# Patient Record
Sex: FEMALE | Race: BLACK OR AFRICAN AMERICAN | NOT HISPANIC OR LATINO | Employment: UNEMPLOYED | URBAN - METROPOLITAN AREA
[De-identification: names, ages, dates, MRNs, and addresses within clinical notes are randomized per-mention and may not be internally consistent; named-entity substitution may affect disease eponyms.]

---

## 2023-03-29 DIAGNOSIS — M25.531 BILATERAL WRIST PAIN: Primary | ICD-10-CM

## 2023-03-29 DIAGNOSIS — M25.532 BILATERAL WRIST PAIN: Primary | ICD-10-CM

## 2023-05-12 ENCOUNTER — OFFICE VISIT (OUTPATIENT)
Dept: SPORTS MEDICINE | Facility: CLINIC | Age: 38
End: 2023-05-12
Payer: MEDICAID

## 2023-05-12 ENCOUNTER — HOSPITAL ENCOUNTER (OUTPATIENT)
Dept: RADIOLOGY | Facility: HOSPITAL | Age: 38
Discharge: HOME | End: 2023-05-12
Attending: STUDENT IN AN ORGANIZED HEALTH CARE EDUCATION/TRAINING PROGRAM
Payer: MEDICAID

## 2023-05-12 VITALS — HEIGHT: 62 IN | RESPIRATION RATE: 18 BRPM | WEIGHT: 194.25 LBS | BODY MASS INDEX: 35.75 KG/M2

## 2023-05-12 DIAGNOSIS — M25.532 BILATERAL WRIST PAIN: ICD-10-CM

## 2023-05-12 DIAGNOSIS — G56.03 BILATERAL CARPAL TUNNEL SYNDROME: Primary | ICD-10-CM

## 2023-05-12 DIAGNOSIS — M25.531 BILATERAL WRIST PAIN: ICD-10-CM

## 2023-05-12 DIAGNOSIS — G62.9 NEUROPATHY: ICD-10-CM

## 2023-05-12 PROCEDURE — 3008F BODY MASS INDEX DOCD: CPT | Mod: CPTII,,, | Performed by: STUDENT IN AN ORGANIZED HEALTH CARE EDUCATION/TRAINING PROGRAM

## 2023-05-12 PROCEDURE — 99204 OFFICE O/P NEW MOD 45 MIN: CPT | Mod: S$PBB,,, | Performed by: STUDENT IN AN ORGANIZED HEALTH CARE EDUCATION/TRAINING PROGRAM

## 2023-05-12 PROCEDURE — 1160F RVW MEDS BY RX/DR IN RCRD: CPT | Mod: CPTII,,, | Performed by: STUDENT IN AN ORGANIZED HEALTH CARE EDUCATION/TRAINING PROGRAM

## 2023-05-12 PROCEDURE — 3008F PR BODY MASS INDEX (BMI) DOCUMENTED: ICD-10-PCS | Mod: CPTII,,, | Performed by: STUDENT IN AN ORGANIZED HEALTH CARE EDUCATION/TRAINING PROGRAM

## 2023-05-12 PROCEDURE — 73110 XR WRIST COMPLETE 3 VIEWS BILATERAL: ICD-10-PCS | Mod: 26,50,, | Performed by: RADIOLOGY

## 2023-05-12 PROCEDURE — 1160F PR REVIEW ALL MEDS BY PRESCRIBER/CLIN PHARMACIST DOCUMENTED: ICD-10-PCS | Mod: CPTII,,, | Performed by: STUDENT IN AN ORGANIZED HEALTH CARE EDUCATION/TRAINING PROGRAM

## 2023-05-12 PROCEDURE — 99999 PR PBB SHADOW E&M-EST. PATIENT-LVL III: ICD-10-PCS | Mod: PBBFAC,,, | Performed by: STUDENT IN AN ORGANIZED HEALTH CARE EDUCATION/TRAINING PROGRAM

## 2023-05-12 PROCEDURE — 73110 X-RAY EXAM OF WRIST: CPT | Mod: 26,50,, | Performed by: RADIOLOGY

## 2023-05-12 PROCEDURE — 1159F MED LIST DOCD IN RCRD: CPT | Mod: CPTII,,, | Performed by: STUDENT IN AN ORGANIZED HEALTH CARE EDUCATION/TRAINING PROGRAM

## 2023-05-12 PROCEDURE — 1159F PR MEDICATION LIST DOCUMENTED IN MEDICAL RECORD: ICD-10-PCS | Mod: CPTII,,, | Performed by: STUDENT IN AN ORGANIZED HEALTH CARE EDUCATION/TRAINING PROGRAM

## 2023-05-12 PROCEDURE — 73110 X-RAY EXAM OF WRIST: CPT | Mod: TC,50,FY,PO

## 2023-05-12 PROCEDURE — 99213 OFFICE O/P EST LOW 20 MIN: CPT | Mod: PBBFAC,PO | Performed by: STUDENT IN AN ORGANIZED HEALTH CARE EDUCATION/TRAINING PROGRAM

## 2023-05-12 PROCEDURE — 99999 PR PBB SHADOW E&M-EST. PATIENT-LVL III: CPT | Mod: PBBFAC,,, | Performed by: STUDENT IN AN ORGANIZED HEALTH CARE EDUCATION/TRAINING PROGRAM

## 2023-05-12 PROCEDURE — 99204 PR OFFICE/OUTPT VISIT, NEW, LEVL IV, 45-59 MIN: ICD-10-PCS | Mod: S$PBB,,, | Performed by: STUDENT IN AN ORGANIZED HEALTH CARE EDUCATION/TRAINING PROGRAM

## 2023-05-12 RX ORDER — ALPRAZOLAM 2 MG/1
2 TABLET ORAL 3 TIMES DAILY
COMMUNITY
Start: 2023-04-10

## 2023-05-12 RX ORDER — CYCLOBENZAPRINE HCL 10 MG
10 TABLET ORAL 3 TIMES DAILY PRN
COMMUNITY
Start: 2023-05-04 | End: 2023-05-14

## 2023-05-12 RX ORDER — IBUPROFEN 800 MG/1
800 TABLET ORAL 3 TIMES DAILY
COMMUNITY
Start: 2023-02-13 | End: 2023-06-26 | Stop reason: SDUPTHER

## 2023-05-12 RX ORDER — OXYCODONE AND ACETAMINOPHEN 5; 325 MG/1; MG/1
1 TABLET ORAL 3 TIMES DAILY
COMMUNITY
Start: 2023-05-04

## 2023-05-12 RX ORDER — NAPROXEN 500 MG/1
TABLET ORAL
COMMUNITY
Start: 2023-05-01 | End: 2023-06-26 | Stop reason: ALTCHOICE

## 2023-05-12 RX ORDER — GABAPENTIN 100 MG/1
CAPSULE ORAL
Qty: 90 CAPSULE | Refills: 1 | Status: SHIPPED | OUTPATIENT
Start: 2023-05-12 | End: 2023-06-26
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
## 2023-05-12 NOTE — PATIENT INSTRUCTIONS
Assessment:  Radha Bowens is a 37 y.o. female with a chief complaint of Swelling, Numbness, and Pain of the Left Wrist and Swelling, Pain, and Numbness of the Right Wrist    Encounter Diagnoses   Name Primary?    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, no other abnormalities  No pertinent labs available for review  History & clinical exam is consistent with chronic bilateral carpal tunnel syndrome  As she is not responding sufficiently with corticosteroid injection from PCP, and as she has not had prior testing, recommend EMG/NCS to further evaluate and help determine the best next course of treatment  Referral placed to [facility] for EMG/NCS  Prescription today for gabapentin  Begin 100 mg, three times per day  After 1-2 weeks, can increase to 200-300 mg (2-3 tablets), three times per day  Don't exceed 900 mg in a day prior to reevaluation  Will follow up after EMG/NCS, discuss results and determine further treatment at that time    Follow-up: after EMG or sooner if there are any problems between now and then.    Thank you for choosing [facility] and Dr. Yamil Ashley for your orthopedic & sports medicine care. It is our goal to provide you with exceptional care that will help keep you healthy, active, and get you back in the game.    Please do not hesitate to reach out to us via email, phone, or MyChart with any questions, concerns, or feedback.    If you are experiencing pain/discomfort ,or have questions after 5pm and would like to be connected to the [facility] on-call team, please call this number and specify which Sports Medicine provider is treating you: [phone]

## 2023-05-16 ENCOUNTER — TELEPHONE (OUTPATIENT)
Dept: PHYSICAL MEDICINE AND REHAB | Facility: CLINIC | Age: 38
End: 2023-05-16
Payer: MEDICAID

## 2023-05-16 NOTE — TELEPHONE ENCOUNTER
Attempted to call patient to schedule EMG. Unable to leave message and patient doesn't have Mychart.

## 2023-06-20 ENCOUNTER — OFFICE VISIT (OUTPATIENT)
Dept: PHYSICAL MEDICINE AND REHAB | Facility: CLINIC | Age: 38
End: 2023-06-20
Payer: MEDICAID

## 2023-06-20 ENCOUNTER — TELEPHONE (OUTPATIENT)
Dept: SPORTS MEDICINE | Facility: CLINIC | Age: 38
End: 2023-06-20
Payer: MEDICAID

## 2023-06-20 VITALS — WEIGHT: 194 LBS | BODY MASS INDEX: 35.7 KG/M2 | RESPIRATION RATE: 14 BRPM | HEIGHT: 62 IN

## 2023-06-20 DIAGNOSIS — G56.03 BILATERAL CARPAL TUNNEL SYNDROME: Primary | ICD-10-CM

## 2023-06-20 DIAGNOSIS — M79.18 DIFFUSE MYOFASCIAL PAIN SYNDROME: ICD-10-CM

## 2023-06-20 PROBLEM — F41.9 ANXIETY: Status: ACTIVE | Noted: 2023-01-08

## 2023-06-20 PROBLEM — E87.70 HYPERVOLEMIA: Status: ACTIVE | Noted: 2022-09-26

## 2023-06-20 PROBLEM — I10 BENIGN ESSENTIAL HYPERTENSION: Status: ACTIVE | Noted: 2022-09-26

## 2023-06-20 PROBLEM — L81.9 DYSPIGMENTATION: Status: ACTIVE | Noted: 2017-02-15

## 2023-06-20 PROBLEM — E66.01 MORBID (SEVERE) OBESITY DUE TO EXCESS CALORIES: Status: ACTIVE | Noted: 2023-01-08

## 2023-06-20 PROCEDURE — 95911 NRV CNDJ TEST 9-10 STUDIES: CPT | Mod: 26,S$PBB,, | Performed by: PHYSICAL MEDICINE & REHABILITATION

## 2023-06-20 PROCEDURE — 3008F BODY MASS INDEX DOCD: CPT | Mod: CPTII,,, | Performed by: PHYSICAL MEDICINE & REHABILITATION

## 2023-06-20 PROCEDURE — 99999 PR PBB SHADOW E&M-EST. PATIENT-LVL III: CPT | Mod: PBBFAC,,, | Performed by: PHYSICAL MEDICINE & REHABILITATION

## 2023-06-20 PROCEDURE — 1160F RVW MEDS BY RX/DR IN RCRD: CPT | Mod: CPTII,,, | Performed by: PHYSICAL MEDICINE & REHABILITATION

## 2023-06-20 PROCEDURE — 3008F PR BODY MASS INDEX (BMI) DOCUMENTED: ICD-10-PCS | Mod: CPTII,,, | Performed by: PHYSICAL MEDICINE & REHABILITATION

## 2023-06-20 PROCEDURE — 99202 OFFICE O/P NEW SF 15 MIN: CPT | Mod: 25,S$PBB,, | Performed by: PHYSICAL MEDICINE & REHABILITATION

## 2023-06-20 PROCEDURE — 95911 PR NERVE CONDUCTION STUDY; 9-10 STUDIES: ICD-10-PCS | Mod: 26,S$PBB,, | Performed by: PHYSICAL MEDICINE & REHABILITATION

## 2023-06-20 PROCEDURE — 1160F PR REVIEW ALL MEDS BY PRESCRIBER/CLIN PHARMACIST DOCUMENTED: ICD-10-PCS | Mod: CPTII,,, | Performed by: PHYSICAL MEDICINE & REHABILITATION

## 2023-06-20 PROCEDURE — 99202 PR OFFICE/OUTPT VISIT, NEW, LEVL II, 15-29 MIN: ICD-10-PCS | Mod: 25,S$PBB,, | Performed by: PHYSICAL MEDICINE & REHABILITATION

## 2023-06-20 PROCEDURE — 1159F MED LIST DOCD IN RCRD: CPT | Mod: CPTII,,, | Performed by: PHYSICAL MEDICINE & REHABILITATION

## 2023-06-20 PROCEDURE — 99999 PR PBB SHADOW E&M-EST. PATIENT-LVL III: ICD-10-PCS | Mod: PBBFAC,,, | Performed by: PHYSICAL MEDICINE & REHABILITATION

## 2023-06-20 PROCEDURE — 99213 OFFICE O/P EST LOW 20 MIN: CPT | Mod: PBBFAC,25 | Performed by: PHYSICAL MEDICINE & REHABILITATION

## 2023-06-20 PROCEDURE — 95911 NRV CNDJ TEST 9-10 STUDIES: CPT | Mod: PBBFAC | Performed by: PHYSICAL MEDICINE & REHABILITATION

## 2023-06-20 PROCEDURE — 1159F PR MEDICATION LIST DOCUMENTED IN MEDICAL RECORD: ICD-10-PCS | Mod: CPTII,,, | Performed by: PHYSICAL MEDICINE & REHABILITATION

## 2023-06-20 RX ORDER — METHOCARBAMOL 500 MG/1
TABLET, FILM COATED ORAL
COMMUNITY
Start: 2023-05-01

## 2023-06-20 RX ORDER — SEMAGLUTIDE 0.68 MG/ML
INJECTION, SOLUTION SUBCUTANEOUS
COMMUNITY
Start: 2023-04-18

## 2023-06-20 NOTE — PROGRESS NOTES
[facility]   [address]  [place] [address]  Phone: [phone]        Full Name: Radha Bowens YOB: 1985  Patient ID: [id number]      Visit Date: 6/20/2023 13:33  Age: 37 Years  Examining Physician: Dr Persaud  Referring Physician: Dr Ashley  Conclusion: upper ext/  Chief Complaint   Patient presents with    Hand Pain    Muscle Pain         HPI: This is a 37 y.o.  female being seen in clinic today for evaluation of chronic hand and arm achy pain and numbness with associated tightness and pain in her shoulders.  With arm usage her symptoms worsen.  She feels weakness of hand  strength and difficulty opening things.  In the past she had CTS injections that have stopped helping.    History obtained from patient    Past family, medical, social, and surgical history reviewed in chart    Review of Systems:     General- denies lethargy, weight change, fever, chills  Head/neck- denies swallowing difficulties  ENT- denies hearing changes  Cardiovascular-denies chest pain  Pulmonary- denies shortness of breath  GI- denies constipation or bowel incontinence  - denies bladder incontinence  Skin- denies wounds or rashes  Musculoskeletal- + weakness, + pain  Neurologic- + numbness and tingling  Psychiatric- denies depressive or psychotic features, + anxiety  Lymphatic-denies swelling  Endocrine- denies hypoglycemic symptoms/DM history  All other pertinent systems negative     Physical Examination:  General: Well developed, well nourished female, NAD  HEENT:NCAT EOMI bilaterally   Pulmonary:Normal respirations    Spinal Examination: CERVICAL  Active ROM is within normal limits.  Inspection: No deformity of spinal alignment.  Palpation: No vertebral tenderness to percussion.  Tight and ttp at bilateral trapezius, levator scapula, rhomboids with mild triggering    Spinal Examination: LUMBAR or THORACIC  Active ROM is within normal limits.  Inspection: No deformity of spinal alignment.       Musculoskeletal Tests:  Phalen:   Elbow compression (ulnar): neg  Tinels at wrist: +bilaterally    Bilateral Upper and Lower Extremities:  Pulses are 2+ at radial bilaterally.  Shoulder/Elbow/Wrist/Hand ROM wnl   Hip/Knee/Ankle ROM   Bilateral Extremities show normal capillary refill.  No signs of cyanosis, rubor, edema, skin changes, or dysvascular changes of appendages.  Nails appear intact.    Neurological Exam:  Cranial Nerves:  II-XII grossly intact    Manual Muscle Testing: (Motor 5=normal)  5/5 strength bilateral upper extremities except 4/5 apb and     No focal atrophy is noted of either upper or lower extremity.    Bilateral Reflexes:  Renee's response is absent bilaterally.    Sensation: tested to light touch  - intact in arms except dec at fingertips    Gait: Narrow base and good arm swing.      Entire procedure explained to patient prior to proceeding.  Verbal consent obtained      Sensory NCS      Nerve / Sites Rec. Site Onset Lat Peak Lat NP Amp PP Amp Segments Distance Velocity     ms ms µV µV  mm m/s   R Median - Digit II (Antidromic)      Wrist Dig II 3.52 4.35 15.8 20.8 Wrist - Dig  40   L Median - Digit II (Antidromic)      Wrist Dig II 3.54 4.40 10.8 28.5 Wrist - Dig  40   R Ulnar - Digit V (Antidromic)      Wrist Dig V 2.58 3.17 10.3 17.0 Wrist - Dig V 140 54   L Ulnar - Digit V (Antidromic)      Wrist Dig V 2.73 3.17 21.8 29.0 Wrist - Dig V 140 51   R Radial - Anatomical snuff box (Forearm)      Forearm Wrist 1.83 2.21 11.1 11.1 Forearm - Wrist 100 55   L Radial - Anatomical snuff box (Forearm)      Forearm Wrist 1.56 2.08 13.7 20.0 Forearm - Wrist 100 64                   Motor NCS      Nerve / Sites Muscle Latency Amplitude Amp % Duration Segments Distance Lat Diff Velocity     ms mV % ms  mm ms m/s   R Median - APB      Wrist APB 4.29 13.2 100 6.02 Wrist - APB 80        Elbow APB 7.50 12.2 92.1 6.21 Elbow - Wrist 180 3.21 56   L Median - APB      Wrist APB 4.65 8.2 100  6.54 Wrist - APB 80        Elbow APB 8.10 6.5 79.4 6.27 Elbow - Wrist 190 3.46 55   R Ulnar - ADM      Wrist ADM 2.90 6.0 100 6.02 Wrist - ADM 80        B.Elbow ADM 5.73 6.7 112 6.27 B.Elbow - Wrist 200 2.83 71      A.Elbow ADM 7.17 7.2 119 6.15 A.Elbow - B.Elbow 100 1.44 70   L Ulnar - ADM      Wrist ADM 2.60 8.9 100 6.15 Wrist - ADM 80        B.Elbow ADM 5.77 8.6 96.7 6.25 B.Elbow - Wrist 210 3.17 66      A.Elbow ADM 7.56 7.0 78.6 6.21 A.Elbow - B.Elbow 110 1.79 61                   INTERPRETATION  -Bilateral median motor nerve conduction study showed prolonged latency on the left, normal amplitude, and conduction velocity  -Bilateral median sensory nerve conduction study showed prolonged peak latency and normal amplitude  -Bilateral ulnar motor nerve conduction study showed normal latency, amplitude, and conduction velocity  -Bilateral ulnar sensory nerve conduction study showed normal peak latency and amplitude  -Bilateral radial sensory nerve conduction study showed normal peak latency and amplitude      IMPRESSION  ABNORMAL study  2. There is electrodiagnostic evidence of a moderate demyelinating median neuropathy (Carpal tunnel syndrome) across the left wrist and a mild demyelinating CTS across the right wrist  3. There is clinical evidence of myofascial pain    PLAN  Discussed in detail for greater than 30 minutes about diagnosis and treatment plan    1. Follow up with referring provider: Dr. Yamil Ashley  2. Handouts on CTS provided. Rec referral to orthopedics for CTR eval. Consider referral to PT for neck/back myofascial pain  3. This study is good for one year. If symptoms worsen or do not improve, please re-consult.    Ayesha Persaud M.D.  Physical Medicine and Rehab

## 2023-06-22 ENCOUNTER — TELEPHONE (OUTPATIENT)
Dept: SPORTS MEDICINE | Facility: CLINIC | Age: 38
End: 2023-06-22
Payer: MEDICAID

## 2023-06-22 NOTE — TELEPHONE ENCOUNTER
----- Message from Ernesto Cesar LPN sent at 6/22/2023  3:07 PM CDT -----  Contact: Radha    ----- Message -----  From: Charisma Alvarado  Sent: 6/22/2023   3:05 PM CDT  To: [facility] Staff    Pt is calling in regards to wanting to what happen next. Pt had her appt on 06/20 and was told she has carpal tunnel and was unsure if anyone would be contacting her about the next steps. Please call back at [phone]                          Thanks  KT

## 2023-06-26 ENCOUNTER — OFFICE VISIT (OUTPATIENT)
Dept: SPORTS MEDICINE | Facility: CLINIC | Age: 38
End: 2023-06-26
Payer: MEDICAID

## 2023-06-26 DIAGNOSIS — G89.29 CHRONIC MYOFASCIAL PAIN: ICD-10-CM

## 2023-06-26 DIAGNOSIS — G56.03 BILATERAL CARPAL TUNNEL SYNDROME: Primary | ICD-10-CM

## 2023-06-26 DIAGNOSIS — G89.4 CHRONIC PAIN SYNDROME: ICD-10-CM

## 2023-06-26 DIAGNOSIS — M79.18 CHRONIC MYOFASCIAL PAIN: ICD-10-CM

## 2023-06-26 DIAGNOSIS — G62.9 NEUROPATHY: ICD-10-CM

## 2023-06-26 PROCEDURE — 1159F MED LIST DOCD IN RCRD: CPT | Mod: CPTII,,, | Performed by: STUDENT IN AN ORGANIZED HEALTH CARE EDUCATION/TRAINING PROGRAM

## 2023-06-26 PROCEDURE — 99214 PR OFFICE/OUTPT VISIT, EST, LEVL IV, 30-39 MIN: ICD-10-PCS | Mod: S$PBB,,, | Performed by: STUDENT IN AN ORGANIZED HEALTH CARE EDUCATION/TRAINING PROGRAM

## 2023-06-26 PROCEDURE — 99999 PR PBB SHADOW E&M-EST. PATIENT-LVL III: CPT | Mod: PBBFAC,,, | Performed by: STUDENT IN AN ORGANIZED HEALTH CARE EDUCATION/TRAINING PROGRAM

## 2023-06-26 PROCEDURE — 99213 OFFICE O/P EST LOW 20 MIN: CPT | Mod: PBBFAC,PN | Performed by: STUDENT IN AN ORGANIZED HEALTH CARE EDUCATION/TRAINING PROGRAM

## 2023-06-26 PROCEDURE — 1159F PR MEDICATION LIST DOCUMENTED IN MEDICAL RECORD: ICD-10-PCS | Mod: CPTII,,, | Performed by: STUDENT IN AN ORGANIZED HEALTH CARE EDUCATION/TRAINING PROGRAM

## 2023-06-26 PROCEDURE — 99999 PR PBB SHADOW E&M-EST. PATIENT-LVL III: ICD-10-PCS | Mod: PBBFAC,,, | Performed by: STUDENT IN AN ORGANIZED HEALTH CARE EDUCATION/TRAINING PROGRAM

## 2023-06-26 PROCEDURE — 1160F RVW MEDS BY RX/DR IN RCRD: CPT | Mod: CPTII,,, | Performed by: STUDENT IN AN ORGANIZED HEALTH CARE EDUCATION/TRAINING PROGRAM

## 2023-06-26 PROCEDURE — 99214 OFFICE O/P EST MOD 30 MIN: CPT | Mod: S$PBB,,, | Performed by: STUDENT IN AN ORGANIZED HEALTH CARE EDUCATION/TRAINING PROGRAM

## 2023-06-26 PROCEDURE — 1160F PR REVIEW ALL MEDS BY PRESCRIBER/CLIN PHARMACIST DOCUMENTED: ICD-10-PCS | Mod: CPTII,,, | Performed by: STUDENT IN AN ORGANIZED HEALTH CARE EDUCATION/TRAINING PROGRAM

## 2023-06-26 RX ORDER — IBUPROFEN 800 MG/1
800 TABLET ORAL EVERY 8 HOURS PRN
Qty: 30 TABLET | Refills: 0 | Status: SHIPPED | OUTPATIENT
Start: 2023-06-26 | End: 2023-09-07 | Stop reason: ALTCHOICE

## 2023-06-26 NOTE — PATIENT INSTRUCTIONS
Assessment:  Radha Bowens is a 37 y.o. female with a chief complaint of Pain of the Right Wrist and Pain of the Left Wrist    Encounter Diagnoses   Name Primary?    Bilateral carpal tunnel syndrome Yes    Neuropathy     Chronic pain syndrome     Chronic myofascial pain       Plan:  EMG/NCV test demonstrates moderate left and mild right CTS - she has had multiple CSI in the past, with limited relief  Refill ibuprofen 800mg  Gabapentin discontinued, as not effective and given risks with concurrent Xanax use  Referral to Dr. Horta to discuss carpal tunnel release  Referral to neurology for comprehensive neuro evaluation given symptoms consistent with polyneuropathy, fibromyalgia  Physical therapy ordered at [facility] - 2-3 visits per week for 6-8 weeks.     Follow-up: As needed or sooner if there are any problems between now and then.    Thank you for choosing [facility] and Dr. Yamil Ashley for your orthopedic & sports medicine care. It is our goal to provide you with exceptional care that will help keep you healthy, active, and get you back in the game.    Please do not hesitate to reach out to us via email, phone, or MyChart with any questions, concerns, or feedback.    If you are experiencing pain/discomfort ,or have questions after 5pm and would like to be connected to the [facility] on-call team, please call this number and specify which Sports Medicine provider is treating you: [phone]
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
## 2023-07-05 ENCOUNTER — TELEPHONE (OUTPATIENT)
Dept: ORTHOPEDICS | Facility: CLINIC | Age: 38
End: 2023-07-05
Payer: MEDICAID

## 2023-07-05 NOTE — TELEPHONE ENCOUNTER
Attempted to return the patients phone call there was no answer     ----- Message from Nathaly Galvez sent at 7/5/2023  2:35 PM CDT -----  Contact: siena  Patient is calling to speak with the nurse regarding appointment. Reports having a referral in and needs to get scheduled. Please give the patient a call back at .[phone]   Thanks areynaldo

## 2023-07-06 ENCOUNTER — TELEPHONE (OUTPATIENT)
Dept: ORTHOPEDICS | Facility: CLINIC | Age: 38
End: 2023-07-06
Payer: MEDICAID

## 2023-07-06 NOTE — TELEPHONE ENCOUNTER
Spoke to the patient an was able to get her scheduled for the next available with Dr. Horta on 8/22/2023 patient verbalized understanding       ----- Message from Charisma Alvarado sent at 7/6/2023 11:25 AM CDT -----  Contact: Radha  .Type:  Patient Returning Call    Who Called:Radha  Who Left Message for Patient:Nurse  Does the patient know what this is regarding?:Yes  Would the patient rather a call back or a response via [facility]? Call back  Best Call Back Number:[phone]  Additional Information: NA                    Thanks  KT

## 2023-08-15 ENCOUNTER — CLINICAL SUPPORT (OUTPATIENT)
Dept: REHABILITATION | Facility: HOSPITAL | Age: 38
End: 2023-08-15
Payer: MEDICAID

## 2023-08-15 DIAGNOSIS — R29.898 UPPER EXTREMITY WEAKNESS: ICD-10-CM

## 2023-08-15 DIAGNOSIS — M54.50 CHRONIC BILATERAL LOW BACK PAIN WITHOUT SCIATICA: ICD-10-CM

## 2023-08-15 DIAGNOSIS — Z74.09 IMPAIRED FUNCTIONAL MOBILITY, BALANCE, AND ENDURANCE: ICD-10-CM

## 2023-08-15 DIAGNOSIS — M54.2 NECK PAIN: ICD-10-CM

## 2023-08-15 DIAGNOSIS — R29.898 WEAKNESS OF BOTH LOWER EXTREMITIES: ICD-10-CM

## 2023-08-15 DIAGNOSIS — M25.511 ACUTE PAIN OF RIGHT SHOULDER: ICD-10-CM

## 2023-08-15 DIAGNOSIS — G89.29 CHRONIC BILATERAL LOW BACK PAIN WITHOUT SCIATICA: ICD-10-CM

## 2023-08-15 PROCEDURE — 97163 PT EVAL HIGH COMPLEX 45 MIN: CPT | Mod: PN

## 2023-08-15 PROCEDURE — 97110 THERAPEUTIC EXERCISES: CPT | Mod: PN

## 2023-08-15 NOTE — PLAN OF CARE
[facility]  Physical Therapy Neurological Rehabilitation Initial Evaluation    Name: Radha Bowens  Clinic Number: [id number]    Therapy Diagnosis:   Encounter Diagnoses   Name Primary?    Chronic bilateral low back pain without sciatica     Neck pain     Acute pain of right shoulder     Upper extremity weakness     Weakness of both lower extremities     Impaired functional mobility, balance, and endurance      Physician: Yamil Ashley MD    Physician Orders: PT Eval and Treat   Medical Diagnosis from Referral:    G62.9 (ICD-10-CM) - Neuropathy   G89.4 (ICD-10-CM) - Chronic pain syndrome   M79.18,G89.29 (ICD-10-CM) - Chronic myofascial pain   Evaluation Date: 8/15/2023  Authorization Period Expiration: 6/25/2023  Plan of Care Expiration: 11/15/2023  Visit # / Visits authorized: 1/ 1    PN DUE: 9/15/2023    Time In: 1:20 PM  Time Out: 2:00 PM  Total Billable Time: 45 minutes    Precautions: Standard    Subjective   Date of onset: Pt reports that on May 1st [facility]. She slipped at fell on liquid detergent and landed on her left knee with her left hand out and extended.     History of current condition - Radha reports: bilateral neck pain that causes headaches, bilateral lower back pain, lower extremity weakness and right shoulder pain. She states she has numbness and tingling in bilateral upper and bilateral lower extremities. She reports much fatigue and weakness throughout her entire body.      Medical History:   No past medical history on file.    Surgical History:   Radha Bowens  has no past surgical history on file.    Medications:   Radha has a current medication list which includes the following prescription(s): alprazolam, ibuprofen, methocarbamol, oxycodone-acetaminophen, and ozempic.    Allergies:   Review of patient's allergies indicates:  No Known Allergies     Imaging: None    Prior Therapy: yes  Social History:  lives with their  family  Occupation: Manager for [employer]   Prior Level of Function: independent with functional mobility, recreational activity, house hold chores, work duties, and ADLs  Current Level of Function: constant low back and neck pain, increased pain with activity and prolonged sitting/standing, headaches, weakness    Pain:  Neck: Current 7/10, worst 7/10, best 5/10   Back: Current 10/10, worst 10/10, best 10/10   Location:  bilateral neck and bilateral back pain   Description:   - NECK: burning, shooting, aching,   - BACK: tight, dull, aching  Aggravating Factors: constant pain  Easing Factors: rest , muscle relaxer (not currently taking anymore)     Pts goals: decreased pain    Objective   Observation: pt pleasant and appropriate throughout evaluation; A&O x 3     Sensation:  Bilateral upper extremity numbness and tingling, new symptom. Impaired light touch sensation of right upper extremity as compared to left. Bilateral lower extremity numbness and tingling, new symptom. Impaired light touch sensation of bilateral lower extremities.     Cervical Range of Motion:    Degrees Pain   Flexion (50) 50 degrees No pain     Extension (75) 55 degrees pain     Right Rotation (75) 45 degrees Pain     Left Rotation (75) 70 degrees No pain     Right Side Bending (45) 30 degrees pain   Left Side Bending (45) 35 degrees pain      Shoulder Range of motion: Pt exhibits full and equal range of motion in bilateral shoulders. Increased pain of right shoulder with flexion and abduction at 90 degrees.     Upper Extremity Strength   (L) UE (R) UE   Shoulder flexion: 4+/5 4-/5   Shoulder Abduction: 4+/5 4-/5   Shoulder ER 4/5 4-/5   Shoulder IR 4/5 4-/5   Lower Trap 4/5 4-/5   Middle Trap 4/5 4-/5   Rhomboids 4/5 4-/5     Lumbar Range of Motion:    % of normal motion Pain   Flexion 100%   No pain        Extension 100%   No pain        Left Side Bending 100% No Pain        Right Side Bending 100% No pain        Left rotation   100% No  "pain        Right Rotation   100% No pain           Lower Extremity Strength  Right LE  Left LE    Knee extension: 4/5 Knee extension: 5/5   Knee flexion: 4/5 Knee flexion: 5/5   Hip flexion: 4/5 Hip flexion: 4+/5   Hip extension:  4/5 Hip extension: 4/5   Hip abduction: 4/5 Hip abduction: 4+/5   Hip adduction: 4/5 Hip adduction 4+/5   Ankle dorsiflexion: 5/5 Ankle dorsiflexion: 5/5   Ankle plantarflexion: 4+/5 Ankle plantarflexion: 4+/5     Special Tests:  -Repeated Flexion: increased pain  -Repeated Ext: increased pain    Function: FOTO      TREATMENT   Treatment Time In: 1:50 PM  Treatment Time Out: 2:00 PM  Total Treatment time separate from Evaluation: 10 minutes    Radha received therapeutic exercises to develop strength, endurance, ROM, flexibility, posture, and core stabilization for 10 minutes including:  UE:   - (B) upper trap stretch 2 x 30"  - (B) levator scap stretch 2 x 30"  - Pectoralis stretch 2 x 30"  - Cat/cow x10  - (B) open book x 10     LE: - education  - LTR  - Single knee to chest   - (B) piriformis stretch 2 x 30"  - (B) side-lying hamstring stretch 2 x 30"    Home Exercises and Patient Education Provided    Education provided:   - PT POC  - HEP  - PT prognosis and diagnosis  - all questions and concerns answered       Written Home Exercises Provided: Patient instructed to cont prior HEP.  Exercises were reviewed and Radha was able to demonstrate them prior to the end of the session.  Radha demonstrated good  understanding of the education provided.     See EMR under Patient Instructions for exercises provided 8/15/2023.    Assessment   Radha is a 38 y.o. female referred to outpatient Physical Therapy with a medical diagnosis of G62.9 (ICD-10-CM) - Neuropathy   G89.4 (ICD-10-CM) - Chronic pain syndrome   M79.18,G89.29 (ICD-10-CM) - Chronic myofascial pain .     The patient presents with impairments which include impairments list: ROM, strength, endurance, joint mobility, muscle " length, balance, posture, gait mechanics, core strength and stability, and functional movement patterns.  These impairments are limiting patient's ability to perform daily activities due to pain.     Pt prognosis is Fair due to personal factors and co-morbidities listed below. Pt will benefit from skilled outpatient Physical Therapy to address the deficits stated above and in the chart below, provide pt/family education, and to maximize pt's level of independence.     Plan of care discussed with patient: Yes  Pt's spiritual, cultural and educational needs considered and patient is agreeable to the plan of care and goals as stated below:       Anticipated Barriers for therapy: pain    Medical Necessity is demonstrated by the following:   History  Co-morbidities and personal factors that may impact the plan of care [] LOW: no personal factors / co-morbidities  [x] MODERATE: 1-2 personal factors / co-morbidities  [] HIGH: 3+ personal factors / co-morbidities    Moderate / High Support Documentation:   Co-morbidities affecting plan of care: See Above    Personal Factors:   age     Examination  Body Structures and Functions, activity limitations and participation restrictions that may impact the plan of care [] LOW: addressing 1-2 elements  [] MODERATE: 3+ elements  [x] HIGH: 4+ elements (please support below)    Moderate / High Support Documentation: upper extremity weakness, lower extremity weakness, decreased cervical range of motion, decreased lumbar range of motion, neck pain, low back pain, shoulder pain, impaired functional mobility      Clinical Presentation [] LOW: stable  [x] MODERATE: Evolving  [] HIGH: Unstable     Decision Making/ Complexity Score: moderate       Goals:  Short Term Goals: 5 weeks   Patient will demonstrate independence with HEP in order to progress toward functional independence  Pt will demonstrate improved pain by reports of less than or equal to 7/10 worst pain in bilateral lower back on  the verbal rating scale in order to progress toward maximal functional ability and improve QOL.  Pt will demonstrate improved pain by reports of less than or equal to 7/10 worst pain in bilateral neck on the verbal rating scale in order to progress toward maximal functional ability and improve QOL.  Pt will improve cervical extension motion to greater than or equal to 60 degrees for improved range of motion for functional activities  Pt will improve Right cervical rotation motion to greater than or equal to 55 degrees for improved range of motion for functional activities  Pt will improve cervical side bending motion to greater than or equal to 40 degrees for improved range of motion for functional activities    Long Term Goals: 8-10 weeks   Patient will demonstrate improved function as indicated by a functional score of 55% on the FOTO.  Pt will demonstrate improved pain by reports of less than or equal to 5/10 worst pain on the verbal rating scale in order to progress toward maximal functional ability and improve QOL.  Pt will demonstrate improved pain by reports of less than or equal to 5/10 worst pain on the verbal rating scale in order to progress toward maximal functional ability and improve QOL.  Patient will improve cervical range of motion to within normal limits with less than 5/10 pain for improved ease with driving tasks.   Patient will improve bilateral upper extremity strength to 4+/5 or greater for improved functional upper extremity strength  Patient will improve bilateral lower extremity strength to 4+/5 or greater for improved functional lower extremity strength    Plan   Plan of care Certification: 8/15/2023 to 11/15/2023.    Outpatient Physical Therapy 2 times weekly for 10 weeks to include the following interventions: Aquatic Therapy, Cervical/Lumbar Traction, Electrical Stimulation , Gait Training, Manual Therapy, Moist Heat/ Ice, Neuromuscular Re-ed, Orthotic Management and Training, Patient  Education, Self Care, Therapeutic Activities, Therapeutic Exercise, and Ultrasound. And any other therapies and modalities as clinically indicated and appropriate, including but not limited to FDN and telehealth. Pt may be seen by PTA as a part of pt's care team.     Chana Benson, PT, DPT  8/15/2023

## 2023-08-15 NOTE — PROGRESS NOTES
[facility]  Physical Therapy Neurological Rehabilitation Initial Evaluation    Name: Radha Bowens  Clinic Number: [id number]    Therapy Diagnosis:   Encounter Diagnoses   Name Primary?    Chronic bilateral low back pain without sciatica     Neck pain     Acute pain of right shoulder     Upper extremity weakness     Weakness of both lower extremities     Impaired functional mobility, balance, and endurance      Physician: Yamil Ashley MD    Physician Orders: PT Eval and Treat   Medical Diagnosis from Referral:    G62.9 (ICD-10-CM) - Neuropathy   G89.4 (ICD-10-CM) - Chronic pain syndrome   M79.18,G89.29 (ICD-10-CM) - Chronic myofascial pain   Evaluation Date: 8/15/2023  Authorization Period Expiration: 6/25/2023  Plan of Care Expiration: 11/15/2023  Visit # / Visits authorized: 1/ 1    PN DUE: 9/15/2023    Time In: 1:20 PM  Time Out: 2:00 PM  Total Billable Time: 45 minutes    Precautions: Standard    Subjective   Date of onset: Pt reports that on May 1st she slipped [facility] fell [facility]. She slipped at fell on liquid detergent and landed on her left knee with her left hand out and extended.     History of current condition - Radha reports: bilateral neck pain that causes headaches, bilateral lower back pain, lower extremity weakness and right shoulder pain. She states she has numbness and tingling in bilateral upper and bilateral lower extremities. She reports much fatigue and weakness throughout her entire body.      Medical History:   No past medical history on file.    Surgical History:   Radha Bowens  has no past surgical history on file.    Medications:   Radha has a current medication list which includes the following prescription(s): alprazolam, ibuprofen, methocarbamol, oxycodone-acetaminophen, and ozempic.    Allergies:   Review of patient's allergies indicates:  No Known Allergies     Imaging: None    Prior Therapy: yes  Social History:  lives with their  family  Occupation: Manager for [employer]   Prior Level of Function: independent with functional mobility, recreational activity, house hold chores, work duties, and ADLs  Current Level of Function: constant low back and neck pain, increased pain with activity and prolonged sitting/standing, headaches, weakness    Pain:  Neck: Current 7/10, worst 7/10, best 5/10   Back: Current 10/10, worst 10/10, best 10/10   Location:  bilateral neck and bilateral back pain   Description:   - NECK: burning, shooting, aching,   - BACK: tight, dull, aching  Aggravating Factors: constant pain  Easing Factors: rest , muscle relaxer (not currently taking anymore)     Pts goals: decreased pain    Objective   Observation: pt pleasant and appropriate throughout evaluation; A&O x 3     Sensation:  Bilateral upper extremity numbness and tingling, new symptom. Impaired light touch sensation of right upper extremity as compared to left. Bilateral lower extremity numbness and tingling, new symptom. Impaired light touch sensation of bilateral lower extremities.     Cervical Range of Motion:    Degrees Pain   Flexion (50) 50 degrees No pain     Extension (75) 55 degrees pain     Right Rotation (75) 45 degrees Pain     Left Rotation (75) 70 degrees No pain     Right Side Bending (45) 30 degrees pain   Left Side Bending (45) 35 degrees pain      Shoulder Range of motion: Pt exhibits full and equal range of motion in bilateral shoulders. Increased pain of right shoulder with flexion and abduction at 90 degrees.     Upper Extremity Strength   (L) UE (R) UE   Shoulder flexion: 4+/5 4-/5   Shoulder Abduction: 4+/5 4-/5   Shoulder ER 4/5 4-/5   Shoulder IR 4/5 4-/5   Lower Trap 4/5 4-/5   Middle Trap 4/5 4-/5   Rhomboids 4/5 4-/5     Lumbar Range of Motion:    % of normal motion Pain   Flexion 100%   No pain        Extension 100%   No pain        Left Side Bending 100% No Pain        Right Side Bending 100% No pain        Left rotation   100% No  "pain        Right Rotation   100% No pain           Lower Extremity Strength  Right LE  Left LE    Knee extension: 4/5 Knee extension: 5/5   Knee flexion: 4/5 Knee flexion: 5/5   Hip flexion: 4/5 Hip flexion: 4+/5   Hip extension:  4/5 Hip extension: 4/5   Hip abduction: 4/5 Hip abduction: 4+/5   Hip adduction: 4/5 Hip adduction 4+/5   Ankle dorsiflexion: 5/5 Ankle dorsiflexion: 5/5   Ankle plantarflexion: 4+/5 Ankle plantarflexion: 4+/5     Special Tests:  -Repeated Flexion: increased pain  -Repeated Ext: increased pain    Function: FOTO      TREATMENT   Treatment Time In: 1:50 PM  Treatment Time Out: 2:00 PM  Total Treatment time separate from Evaluation: 10 minutes    Radha received therapeutic exercises to develop strength, endurance, ROM, flexibility, posture, and core stabilization for 10 minutes including:  UE:   - (B) upper trap stretch 2 x 30"  - (B) levator scap stretch 2 x 30"  - Pectoralis stretch 2 x 30"  - Cat/cow x10  - (B) open book x 10     LE: - education  - LTR  - Single knee to chest   - (B) piriformis stretch 2 x 30"  - (B) side-lying hamstring stretch 2 x 30"    Home Exercises and Patient Education Provided    Education provided:   - PT POC  - HEP  - PT prognosis and diagnosis  - all questions and concerns answered       Written Home Exercises Provided: Patient instructed to cont prior HEP.  Exercises were reviewed and Radha was able to demonstrate them prior to the end of the session.  Radha demonstrated good  understanding of the education provided.     See EMR under Patient Instructions for exercises provided  8/15/2023 .    Assessment   Radha is a 38 y.o. female referred to outpatient Physical Therapy with a medical diagnosis of G62.9 (ICD-10-CM) - Neuropathy   G89.4 (ICD-10-CM) - Chronic pain syndrome   M79.18,G89.29 (ICD-10-CM) - Chronic myofascial pain .     The patient presents with impairments which include impairments list: ROM, strength, endurance, joint mobility, muscle " length, balance, posture, gait mechanics, core strength and stability, and functional movement patterns.  These impairments are limiting patient's ability to perform daily activities due to pain.     Pt prognosis is Fair due to personal factors and co-morbidities listed below. Pt will benefit from skilled outpatient Physical Therapy to address the deficits stated above and in the chart below, provide pt/family education, and to maximize pt's level of independence.     Plan of care discussed with patient: Yes  Pt's spiritual, cultural and educational needs considered and patient is agreeable to the plan of care and goals as stated below:       Anticipated Barriers for therapy: pain    Medical Necessity is demonstrated by the following:   History  Co-morbidities and personal factors that may impact the plan of care [] LOW: no personal factors / co-morbidities  [x] MODERATE: 1-2 personal factors / co-morbidities  [] HIGH: 3+ personal factors / co-morbidities    Moderate / High Support Documentation:   Co-morbidities affecting plan of care: See Above    Personal Factors:   age     Examination  Body Structures and Functions, activity limitations and participation restrictions that may impact the plan of care [] LOW: addressing 1-2 elements  [] MODERATE: 3+ elements  [x] HIGH: 4+ elements (please support below)    Moderate / High Support Documentation: upper extremity weakness, lower extremity weakness, decreased cervical range of motion, decreased lumbar range of motion, neck pain, low back pain, shoulder pain, impaired functional mobility      Clinical Presentation [] LOW: stable  [x] MODERATE: Evolving  [] HIGH: Unstable     Decision Making/ Complexity Score: moderate       Goals:  Short Term Goals: 5 weeks   Patient will demonstrate independence with HEP in order to progress toward functional independence  Pt will demonstrate improved pain by reports of less than or equal to 7/10 worst pain in bilateral lower back on  the verbal rating scale in order to progress toward maximal functional ability and improve QOL.  Pt will demonstrate improved pain by reports of less than or equal to 7/10 worst pain in bilateral neck on the verbal rating scale in order to progress toward maximal functional ability and improve QOL.  Pt will improve cervical extension motion to greater than or equal to 60 degrees for improved range of motion for functional activities  Pt will improve Right cervical rotation motion to greater than or equal to 55 degrees for improved range of motion for functional activities  Pt will improve cervical side bending motion to greater than or equal to 40 degrees for improved range of motion for functional activities    Long Term Goals: 8-10 weeks   Patient will demonstrate improved function as indicated by a functional score of 55% on the FOTO.  Pt will demonstrate improved pain by reports of less than or equal to 5/10 worst pain on the verbal rating scale in order to progress toward maximal functional ability and improve QOL.  Pt will demonstrate improved pain by reports of less than or equal to 5/10 worst pain on the verbal rating scale in order to progress toward maximal functional ability and improve QOL.  Patient will improve cervical range of motion to within normal limits with less than 5/10 pain for improved ease with driving tasks.   Patient will improve bilateral upper extremity strength to 4+/5 or greater for improved functional upper extremity strength  Patient will improve bilateral lower extremity strength to 4+/5 or greater for improved functional lower extremity strength    Plan   Plan of care Certification: 8/15/2023 to 11/15/2023.    Outpatient Physical Therapy 2 times weekly for 10 weeks to include the following interventions: Aquatic Therapy, Cervical/Lumbar Traction, Electrical Stimulation  , Gait Training, Manual Therapy, Moist Heat/ Ice, Neuromuscular Re-ed, Orthotic Management and Training, Patient  Education, Self Care, Therapeutic Activities, Therapeutic Exercise, and Ultrasound. And any other therapies and modalities as clinically indicated and appropriate, including but not limited to FDN and telehealth. Pt may be seen by PTA as a part of pt's care team.     Chana Benson, PT, DPT  8/15/2023

## 2023-08-22 ENCOUNTER — OFFICE VISIT (OUTPATIENT)
Dept: ORTHOPEDICS | Facility: CLINIC | Age: 38
End: 2023-08-22
Payer: MEDICAID

## 2023-08-22 VITALS — BODY MASS INDEX: 35.7 KG/M2 | WEIGHT: 194 LBS | HEIGHT: 62 IN

## 2023-08-22 DIAGNOSIS — Z01.818 PREOP TESTING: ICD-10-CM

## 2023-08-22 DIAGNOSIS — G56.03 BILATERAL CARPAL TUNNEL SYNDROME: Primary | ICD-10-CM

## 2023-08-22 PROCEDURE — 1159F MED LIST DOCD IN RCRD: CPT | Mod: CPTII,,, | Performed by: ORTHOPAEDIC SURGERY

## 2023-08-22 PROCEDURE — 3008F PR BODY MASS INDEX (BMI) DOCUMENTED: ICD-10-PCS | Mod: CPTII,,, | Performed by: ORTHOPAEDIC SURGERY

## 2023-08-22 PROCEDURE — 1160F PR REVIEW ALL MEDS BY PRESCRIBER/CLIN PHARMACIST DOCUMENTED: ICD-10-PCS | Mod: CPTII,,, | Performed by: ORTHOPAEDIC SURGERY

## 2023-08-22 PROCEDURE — 99204 PR OFFICE/OUTPT VISIT, NEW, LEVL IV, 45-59 MIN: ICD-10-PCS | Mod: S$PBB,,, | Performed by: ORTHOPAEDIC SURGERY

## 2023-08-22 PROCEDURE — 1159F PR MEDICATION LIST DOCUMENTED IN MEDICAL RECORD: ICD-10-PCS | Mod: CPTII,,, | Performed by: ORTHOPAEDIC SURGERY

## 2023-08-22 PROCEDURE — 3008F BODY MASS INDEX DOCD: CPT | Mod: CPTII,,, | Performed by: ORTHOPAEDIC SURGERY

## 2023-08-22 PROCEDURE — 99999 PR PBB SHADOW E&M-EST. PATIENT-LVL II: ICD-10-PCS | Mod: PBBFAC,,, | Performed by: ORTHOPAEDIC SURGERY

## 2023-08-22 PROCEDURE — 99212 OFFICE O/P EST SF 10 MIN: CPT | Mod: PBBFAC | Performed by: ORTHOPAEDIC SURGERY

## 2023-08-22 PROCEDURE — 99999 PR PBB SHADOW E&M-EST. PATIENT-LVL II: CPT | Mod: PBBFAC,,, | Performed by: ORTHOPAEDIC SURGERY

## 2023-08-22 PROCEDURE — 1160F RVW MEDS BY RX/DR IN RCRD: CPT | Mod: CPTII,,, | Performed by: ORTHOPAEDIC SURGERY

## 2023-08-22 PROCEDURE — 99204 OFFICE O/P NEW MOD 45 MIN: CPT | Mod: S$PBB,,, | Performed by: ORTHOPAEDIC SURGERY

## 2023-08-22 NOTE — H&P (VIEW-ONLY)
Subjective:     Patient ID: Radha Bowens is a 38 y.o. female.    Chief Complaint: Pain and Numbness of the Right Hand and Pain and Numbness of the Left Hand      HPI:  The patient is a 38-year-old female with bilateral carpal tunnel syndrome documented by nerve conduction studies she is more symptomatic on the right.  She is tried splinting and injection without improvement.  She has been symptomatic for more than 20 years    History reviewed. No pertinent past medical history.  History reviewed. No pertinent surgical history.  Family History   Problem Relation Age of Onset    No Known Problems Mother     No Known Problems Father      Social History     Socioeconomic History    Marital status: Unknown   Tobacco Use    Smoking status: Never     Passive exposure: Never    Smokeless tobacco: Never   Substance and Sexual Activity    Alcohol use: Not Currently    Drug use: Never    Sexual activity: Yes     Partners: Male     Medication List with Changes/Refills   Current Medications    ALPRAZOLAM (XANAX) 2 MG TAB    Take 2 mg by mouth 3 (three) times daily.    IBUPROFEN (ADVIL,MOTRIN) 800 MG TABLET    Take 1 tablet (800 mg total) by mouth every 8 (eight) hours as needed for Pain.    METHOCARBAMOL (ROBAXIN) 500 MG TAB    TAKE 1 TABLET BY MOUTH EVERY MORNING AND BEFORE BEDTIME    OXYCODONE-ACETAMINOPHEN (PERCOCET) 5-325 MG PER TABLET    Take 1 tablet by mouth 3 (three) times daily.    OZEMPIC 0.25 MG OR 0.5 MG (2 MG/3 ML) PEN INJECTOR    INJECT 0.5 MG INTO THE SKIN WEEKLY     Review of patient's allergies indicates:  No Known Allergies  Review of Systems   Constitutional: Negative for malaise/fatigue.   HENT:  Negative for hearing loss.    Eyes:  Negative for double vision and visual disturbance.   Cardiovascular:  Negative for chest pain.   Respiratory:  Negative for shortness of breath.    Endocrine: Negative for cold intolerance.   Hematologic/Lymphatic: Does not bruise/bleed easily.   Skin:  Negative for poor  wound healing and suspicious lesions.   Musculoskeletal:  Positive for back pain, muscle weakness and neck pain. Negative for gout, joint pain and joint swelling.   Gastrointestinal:  Negative for nausea and vomiting.   Genitourinary:  Negative for dysuria.   Neurological:  Positive for loss of balance, numbness, paresthesias and sensory change.   Psychiatric/Behavioral:  Negative for depression, memory loss and substance abuse. The patient is nervous/anxious.    Allergic/Immunologic: Negative for persistent infections.       Objective:   Body mass index is 35.48 kg/m².  There were no vitals filed for this visit.             General    Constitutional: She is oriented to person, place, and time. She appears well-developed and well-nourished. No distress.   HENT:   Head: Normocephalic.   Mouth/Throat: Oropharynx is clear and moist.   Eyes: EOM are normal.   Cardiovascular:  Normal rate.            Pulmonary/Chest: Effort normal.   Abdominal: Soft.   Neurological: She is alert and oriented to person, place, and time. No cranial nerve deficit.   Psychiatric: She has a normal mood and affect.             Right Hand/Wrist Exam     Inspection   Scars: Wrist - absent Hand -  absent  Effusion: Wrist - absent Hand -  absent    Pain   Wrist - The patient exhibits pain of the flexor/pronator group.    Tests   Phalens sign: positive  Tinel's sign (median nerve): positive  Carpal Tunnel Compression Test: positive    Atrophy   Thenar:  negative  Intrinsic:  negative    Other     Neuorologic Exam    Median Distribution: abnormal  Ulnar Distribution: normal  Radial Distribution: normal    Comments:  The patient has a positive Tinel and positive Phalen sign.  There is no thenar atrophy noted.      Left Hand/Wrist Exam     Inspection   Scars: Wrist - absent Hand -  absent  Effusion: Wrist - absent Hand -  absent    Pain   Wrist - The patient exhibits pain of the flexor/pronator group.    Tests   Phalens sign: positive  Tinel's sign  (median nerve): positive  Carpal Tunnel Compression Test: positive    Atrophy  Thenar:  Negative  Intrinsic: negative    Other     Sensory Exam  Median Distribution: abnormal  Ulnar Distribution: normal  Radial Distribution: normal    Comments:  The patient has a positive Tinel and positive Phalen sign.  There is no thenar atrophy noted.          Vascular Exam       Capillary Refill  Right Hand: normal capillary refill  Left Hand: normal capillary refill        radiographs were not obtained today  Assessment:     Encounter Diagnosis   Name Primary?    Bilateral carpal tunnel syndrome Yes        Plan:     The patient wishes to have a right carpal tunnel release.  She was counseled regarding the surgery.  Risk complications and alternatives were discussed including the risk of infection, anesthetic risk, injury to nerves and vessels, loss of motion, and possible need for additional surgeries were discussed.  She seems to understand and agree to that surgery.  All questions were answered.                Disclaimer: This note was prepared using a voice recognition system and is likely to have sound alike errors within the text.

## 2023-08-23 DIAGNOSIS — Z01.818 PRE-OP TESTING: Primary | ICD-10-CM

## 2023-08-23 RX ORDER — NABUMETONE 500 MG/1
500 TABLET, FILM COATED ORAL 2 TIMES DAILY
COMMUNITY
Start: 2023-06-28

## 2023-08-23 RX ORDER — DICYCLOMINE HYDROCHLORIDE 20 MG/1
TABLET ORAL
COMMUNITY
Start: 2023-08-13

## 2023-08-23 RX ORDER — AZITHROMYCIN 250 MG/1
TABLET, FILM COATED ORAL
COMMUNITY
Start: 2023-07-29

## 2023-08-23 RX ORDER — PHENTERMINE HYDROCHLORIDE 37.5 MG/1
37.5 TABLET ORAL
COMMUNITY
Start: 2023-07-29

## 2023-08-23 RX ORDER — TIZANIDINE 4 MG/1
4 TABLET ORAL 2 TIMES DAILY
COMMUNITY
Start: 2023-08-08

## 2023-08-23 RX ORDER — FLUTICASONE PROPIONATE 50 MCG
SPRAY, SUSPENSION (ML) NASAL
COMMUNITY
Start: 2022-11-01

## 2023-08-24 ENCOUNTER — CLINICAL SUPPORT (OUTPATIENT)
Dept: REHABILITATION | Facility: HOSPITAL | Age: 38
End: 2023-08-24
Payer: MEDICAID

## 2023-08-24 DIAGNOSIS — G89.29 CHRONIC BILATERAL LOW BACK PAIN WITHOUT SCIATICA: Primary | ICD-10-CM

## 2023-08-24 DIAGNOSIS — M25.511 ACUTE PAIN OF RIGHT SHOULDER: ICD-10-CM

## 2023-08-24 DIAGNOSIS — M54.50 CHRONIC BILATERAL LOW BACK PAIN WITHOUT SCIATICA: Primary | ICD-10-CM

## 2023-08-24 DIAGNOSIS — R29.898 UPPER EXTREMITY WEAKNESS: ICD-10-CM

## 2023-08-24 DIAGNOSIS — M54.2 NECK PAIN: ICD-10-CM

## 2023-08-24 DIAGNOSIS — R29.898 WEAKNESS OF BOTH LOWER EXTREMITIES: ICD-10-CM

## 2023-08-24 DIAGNOSIS — Z74.09 IMPAIRED FUNCTIONAL MOBILITY, BALANCE, AND ENDURANCE: ICD-10-CM

## 2023-08-24 PROCEDURE — 97110 THERAPEUTIC EXERCISES: CPT | Mod: PN

## 2023-08-24 NOTE — PROGRESS NOTES
"[facility]   Physical Therapy Treatment Note      Name: Radha Bowens  Clinic Number: [id number]    Therapy Diagnosis:   Encounter Diagnoses   Name Primary?    Chronic bilateral low back pain without sciatica Yes    Neck pain     Acute pain of right shoulder     Upper extremity weakness     Weakness of both lower extremities     Impaired functional mobility, balance, and endurance      Physician: Yamil Ashley MD    Visit Date: 8/24/2023    Physician Orders: PT Eval and Treat   Medical Diagnosis from Referral:               G62.9 (ICD-10-CM) - Neuropathy   G89.4 (ICD-10-CM) - Chronic pain syndrome   M79.18,G89.29 (ICD-10-CM) - Chronic myofascial pain   Evaluation Date: 8/15/2023  Authorization Period Expiration: 6/25/2023  Plan of Care Expiration: 11/15/2023  Visit # / Visits authorized: 1/ 20 (+eval)     PN DUE: 9/15/2023  PTA Visit #: 0/5      Time In: 10:45 AM (pt running late)   Time Out: 11:15 AM  Total Billable Time: 30 minutes     Precautions: Standard    Subjective     Pt reports: low back pain today, worse on right than left .  She was compliant with home exercise program.  Response to previous treatment: first follow-up after initial evaluation   Functional change: none reported    Pain: /10  Location:  (R) LBP     Objective      Objective Measures updated at progress report unless specified.     Treatment     Billing all therapy as therapeutic exercise per [place] Medicaid guidelines    Radha received the treatments listed below:    (exercises performed today are bolded)    therapeutic exercises to develop strength, endurance, ROM, flexibility, posture, and core stabilization for 14 minutes including:  UE:   - UBE for increased UE ROM, strength, and endurance 3 min x 3 min   - (B) upper trap stretch 2 x 30"  - (B) levator scap stretch 2 x 30"  - Pectoralis stretch 2 x 30"  - Cat/cow x10  - (B) open book x 10      LE:  - recumbent bike for increased LE ROM, strength, and " "endurance x 5 minutes  - Standing QL stretch 10" x 5  - 3-way forward flexion 5" x 5  - F4 LTR x10 ea  - Double knee to chest with SB 2 x 10  - (B) piriformis stretch 2 x 30"  - (B) side-lying hamstring stretch 2 x 30"  - Bridges 2 x 10  - LAQ 2 x10  - Seated Hamstring curls 2 x 10   - seated marches 2 x 10  - Gastroc stretch 3 x 30"    manual therapy techniques: Joint mobilizations, Manual traction, Myofacial release, Soft tissue Mobilization, and Friction Massage were applied to the: low back and neck for 8 minutes, including:  STM/TPR to (R) QL in side-lying     neuromuscular re-education activities to improve: Balance, Coordination, Kinesthetic, Sense, Proprioception, and Posture for 8 minutes. The following activities were included:  UE:   - Cat/cow x10  - (B) open book x 10   - Hip adduction squeeze 2 x 10   - Alternating isometrics 10 x 10"     LE:      therapeutic activities to improve functional performance for 0  minutes, including:  UE:     LE:   - DL heel raises 3 x 10  - Sit to stands 2 x 10    Patient Education and Home Exercises       Education provided:   - continue with HEP    Written Home Exercises Provided: Patient instructed to cont prior HEP. Exercises were reviewed and Radha was able to demonstrate them prior to the end of the session.  Radha demonstrated good  understanding of the education provided. See EMR under Patient Instructions for exercises provided during therapy sessions    Assessment   Patient tolerated therapy session good today. She presents with much pain in her right low back/hip today. Interventions targeted lumbar mobility and gentle lower extremity and lumbar strengthening. Good relief noted during both standing QL stretch and 3-way forward flexion exercises. Ended session with TPR to right QL with relief noted following. Will inquire response at next session. Plan to continue to increase as tolerated.     Radha Is progressing well towards her goals.   Pt prognosis is " Good.     Pt will continue to benefit from skilled outpatient physical therapy to address the deficits listed in the problem list box on initial evaluation, provide pt/family education and to maximize pt's level of independence in the home and community environment.     Pt's spiritual, cultural and educational needs considered and pt agreeable to plan of care and goals.     Anticipated barriers to physical therapy: pain    Goals:   Short Term Goals: 5 weeks   Patient will demonstrate independence with HEP in order to progress toward functional independence  Pt will demonstrate improved pain by reports of less than or equal to 7/10 worst pain in bilateral lower back on the verbal rating scale in order to progress toward maximal functional ability and improve QOL.  Pt will demonstrate improved pain by reports of less than or equal to 7/10 worst pain in bilateral neck on the verbal rating scale in order to progress toward maximal functional ability and improve QOL.  Pt will improve cervical extension motion to greater than or equal to 60 degrees for improved range of motion for functional activities  Pt will improve Right cervical rotation motion to greater than or equal to 55 degrees for improved range of motion for functional activities  Pt will improve cervical side bending motion to greater than or equal to 40 degrees for improved range of motion for functional activities     Long Term Goals: 8-10 weeks   Patient will demonstrate improved function as indicated by a functional score of 55% on the FOTO.  Pt will demonstrate improved pain by reports of less than or equal to 5/10 worst pain on the verbal rating scale in order to progress toward maximal functional ability and improve QOL.  Pt will demonstrate improved pain by reports of less than or equal to 5/10 worst pain on the verbal rating scale in order to progress toward maximal functional ability and improve QOL.  Patient will improve cervical range of motion to  within normal limits with less than 5/10 pain for improved ease with driving tasks.   Patient will improve bilateral upper extremity strength to 4+/5 or greater for improved functional upper extremity strength  Patient will improve bilateral lower extremity strength to 4+/5 or greater for improved functional lower extremity strength    Plan   Plan of care Certification: 8/15/2023 to 11/15/2023.     Outpatient Physical Therapy 2 times weekly for 10 weeks to include the following interventions: Aquatic Therapy, Cervical/Lumbar Traction, Electrical Stimulation , Gait Training, Manual Therapy, Moist Heat/ Ice, Neuromuscular Re-ed, Orthotic Management and Training, Patient Education, Self Care, Therapeutic Activities, Therapeutic Exercise, and Ultrasound. And any other therapies and modalities as clinically indicated and appropriate, including but not limited to FDN and telehealth. Pt may be seen by PTA as a part of pt's care team    Chana Benson PT,DPT  8/24/2023

## 2023-08-28 ENCOUNTER — CLINICAL SUPPORT (OUTPATIENT)
Dept: REHABILITATION | Facility: HOSPITAL | Age: 38
End: 2023-08-28
Payer: MEDICAID

## 2023-08-28 DIAGNOSIS — Z74.09 IMPAIRED FUNCTIONAL MOBILITY, BALANCE, AND ENDURANCE: ICD-10-CM

## 2023-08-28 DIAGNOSIS — R29.898 UPPER EXTREMITY WEAKNESS: ICD-10-CM

## 2023-08-28 DIAGNOSIS — M25.511 ACUTE PAIN OF RIGHT SHOULDER: ICD-10-CM

## 2023-08-28 DIAGNOSIS — M54.2 NECK PAIN: ICD-10-CM

## 2023-08-28 DIAGNOSIS — M54.50 CHRONIC BILATERAL LOW BACK PAIN WITHOUT SCIATICA: Primary | ICD-10-CM

## 2023-08-28 DIAGNOSIS — G89.29 CHRONIC BILATERAL LOW BACK PAIN WITHOUT SCIATICA: Primary | ICD-10-CM

## 2023-08-28 DIAGNOSIS — R29.898 WEAKNESS OF BOTH LOWER EXTREMITIES: ICD-10-CM

## 2023-08-28 PROCEDURE — 97110 THERAPEUTIC EXERCISES: CPT | Mod: PN,CQ

## 2023-08-28 NOTE — PROGRESS NOTES
"[facility]   Physical Therapy Treatment Note      Name: Radha Bowens  Clinic Number: [id number]    Therapy Diagnosis:   Encounter Diagnoses   Name Primary?    Chronic bilateral low back pain without sciatica Yes    Neck pain     Acute pain of right shoulder     Upper extremity weakness     Weakness of both lower extremities     Impaired functional mobility, balance, and endurance      Physician: Yamil Ashley MD    Visit Date: 8/28/2023    Physician Orders: PT Eval and Treat   Medical Diagnosis from Referral:               G62.9 (ICD-10-CM) - Neuropathy   G89.4 (ICD-10-CM) - Chronic pain syndrome   M79.18,G89.29 (ICD-10-CM) - Chronic myofascial pain   Evaluation Date: 8/15/2023  Authorization Period Expiration: 6/25/2023  Plan of Care Expiration: 11/15/2023  Visit # / Visits authorized: 2/ 20 (+eval)     PN DUE: 9/15/2023  PTA Visit #: 1/5      Time In: 8:25 AM (pt late)   Time Out: 9:00 AM  Total Billable Time: 35 minutes     Precautions: Standard    Subjective     Pt reports: back and neck pain. Back > neck .  She was compliant with home exercise program.  Response to previous treatment: first follow-up after initial evaluation   Functional change: none reported    Pain: /10  Location:  (R) LBP     Objective      Objective Measures updated at progress report unless specified.     Treatment     Billing all therapy as therapeutic exercise per [place] Medicaid guidelines    Radha received the treatments listed below:    (exercises performed today are bolded)    therapeutic exercises to develop strength, endurance, ROM, flexibility, posture, and core stabilization for 25 minutes including:  UE:   - UBE for increased UE ROM, strength, and endurance 3 min x 3 min   - (B) upper trap stretch 2 x 30"  - (B) levator scap stretch 2 x 30"  - Pectoralis stretch 2 x 30"  - Cat/cow x10  - (B) open book x 10      LE:  - recumbent bike for increased LE ROM, strength, and endurance x 5 minutes  - " "Standing QL stretch 10" x 5  - 3-way forward flexion 5" x 5  - F4 LTR x10 ea  - Double knee to chest with SB 2 x 10  - (B) piriformis stretch 2 x 30"  - (B) side-lying hamstring stretch 2 x 30"  - Bridges on SB 2 x 10  - LAQ 2 x10  - Seated Hamstring curls 2 x 10   - seated marches 2 x 10  - Gastroc stretch 3 x 30"    manual therapy techniques: Joint mobilizations, Manual traction, Myofacial release, Soft tissue Mobilization, and Friction Massage were applied to the: low back and neck for 0 minutes, including:  STM/TPR to (R) QL in side-lying     neuromuscular re-education activities to improve: Balance, Coordination, Kinesthetic, Sense, Proprioception, and Posture for 0 minutes. The following activities were included:  UE:   - Cat/cow x10  - (B) open book x 10   - Hip adduction squeeze 2 x 10   - Alternating isometrics 10 x 10"     LE:      therapeutic activities to improve functional performance for 10  minutes, including:  UE:     LE:   - DL heel raises 3 x 10  - Sit to stands w/ yellow weighted ball 3 x 10    Patient Education and Home Exercises       Education provided:   - continue with HEP    Written Home Exercises Provided: Patient instructed to cont prior HEP. Exercises were reviewed and Radha was able to demonstrate them prior to the end of the session.  Radha demonstrated good  understanding of the education provided. See EMR under Patient Instructions for exercises provided during therapy sessions    Assessment   Patient tolerated therapy session good today. She presents with back and neck pain. Continued interventions targeting lumbar mobility and lower extremity and lumbar strengthening. Will inquire response at next session. Plan to continue to increase as tolerated.     Radha Is progressing well towards her goals.   Pt prognosis is Good.     Pt will continue to benefit from skilled outpatient physical therapy to address the deficits listed in the problem list box on initial evaluation, provide " pt/family education and to maximize pt's level of independence in the home and community environment.     Pt's spiritual, cultural and educational needs considered and pt agreeable to plan of care and goals.     Anticipated barriers to physical therapy: pain    Goals:   Short Term Goals: 5 weeks   Patient will demonstrate independence with HEP in order to progress toward functional independence  Pt will demonstrate improved pain by reports of less than or equal to 7/10 worst pain in bilateral lower back on the verbal rating scale in order to progress toward maximal functional ability and improve QOL.  Pt will demonstrate improved pain by reports of less than or equal to 7/10 worst pain in bilateral neck on the verbal rating scale in order to progress toward maximal functional ability and improve QOL.  Pt will improve cervical extension motion to greater than or equal to 60 degrees for improved range of motion for functional activities  Pt will improve Right cervical rotation motion to greater than or equal to 55 degrees for improved range of motion for functional activities  Pt will improve cervical side bending motion to greater than or equal to 40 degrees for improved range of motion for functional activities     Long Term Goals: 8-10 weeks   Patient will demonstrate improved function as indicated by a functional score of 55% on the FOTO.  Pt will demonstrate improved pain by reports of less than or equal to 5/10 worst pain on the verbal rating scale in order to progress toward maximal functional ability and improve QOL.  Pt will demonstrate improved pain by reports of less than or equal to 5/10 worst pain on the verbal rating scale in order to progress toward maximal functional ability and improve QOL.  Patient will improve cervical range of motion to within normal limits with less than 5/10 pain for improved ease with driving tasks.   Patient will improve bilateral upper extremity strength to 4+/5 or greater for  improved functional upper extremity strength  Patient will improve bilateral lower extremity strength to 4+/5 or greater for improved functional lower extremity strength    Plan   Plan of care Certification: 8/15/2023 to 11/15/2023.     Outpatient Physical Therapy 2 times weekly for 10 weeks to include the following interventions: Aquatic Therapy, Cervical/Lumbar Traction, Electrical Stimulation , Gait Training, Manual Therapy, Moist Heat/ Ice, Neuromuscular Re-ed, Orthotic Management and Training, Patient Education, Self Care, Therapeutic Activities, Therapeutic Exercise, and Ultrasound. And any other therapies and modalities as clinically indicated and appropriate, including but not limited to FDN and telehealth. Pt may be seen by PTA as a part of pt's care team    Rafael Taylor PTA

## 2023-08-30 ENCOUNTER — OFFICE VISIT (OUTPATIENT)
Dept: INTERNAL MEDICINE | Facility: CLINIC | Age: 38
End: 2023-08-30
Payer: MEDICAID

## 2023-08-30 VITALS
HEART RATE: 64 BPM | SYSTOLIC BLOOD PRESSURE: 170 MMHG | OXYGEN SATURATION: 100 % | RESPIRATION RATE: 14 BRPM | DIASTOLIC BLOOD PRESSURE: 87 MMHG | TEMPERATURE: 98 F

## 2023-08-30 DIAGNOSIS — G89.29 CHRONIC BILATERAL LOW BACK PAIN WITHOUT SCIATICA: ICD-10-CM

## 2023-08-30 DIAGNOSIS — Z01.818 PRE-OP TESTING: ICD-10-CM

## 2023-08-30 DIAGNOSIS — G56.00 CARPAL TUNNEL SYNDROME, UNSPECIFIED LATERALITY: ICD-10-CM

## 2023-08-30 DIAGNOSIS — Z01.818 PRE-OP EXAM: Primary | ICD-10-CM

## 2023-08-30 DIAGNOSIS — M54.50 CHRONIC BILATERAL LOW BACK PAIN WITHOUT SCIATICA: ICD-10-CM

## 2023-08-30 DIAGNOSIS — I10 BENIGN ESSENTIAL HYPERTENSION: ICD-10-CM

## 2023-08-30 DIAGNOSIS — F41.9 ANXIETY: ICD-10-CM

## 2023-08-30 PROCEDURE — 3079F DIAST BP 80-89 MM HG: CPT | Mod: CPTII,,, | Performed by: ANESTHESIOLOGY

## 2023-08-30 PROCEDURE — 1160F PR REVIEW ALL MEDS BY PRESCRIBER/CLIN PHARMACIST DOCUMENTED: ICD-10-PCS | Mod: CPTII,,, | Performed by: ANESTHESIOLOGY

## 2023-08-30 PROCEDURE — 99214 OFFICE O/P EST MOD 30 MIN: CPT | Mod: PBBFAC

## 2023-08-30 PROCEDURE — 99499 NO LOS: ICD-10-PCS | Mod: S$PBB,,, | Performed by: ANESTHESIOLOGY

## 2023-08-30 PROCEDURE — 1160F RVW MEDS BY RX/DR IN RCRD: CPT | Mod: CPTII,,, | Performed by: ANESTHESIOLOGY

## 2023-08-30 PROCEDURE — 93010 EKG 12-LEAD: ICD-10-PCS | Mod: ,,, | Performed by: INTERNAL MEDICINE

## 2023-08-30 PROCEDURE — 99999 PR PBB SHADOW E&M-EST. PATIENT-LVL IV: ICD-10-PCS | Mod: PBBFAC,,,

## 2023-08-30 PROCEDURE — 99499 UNLISTED E&M SERVICE: CPT | Mod: S$PBB,,, | Performed by: ANESTHESIOLOGY

## 2023-08-30 PROCEDURE — 93005 ELECTROCARDIOGRAM TRACING: CPT

## 2023-08-30 PROCEDURE — 1159F PR MEDICATION LIST DOCUMENTED IN MEDICAL RECORD: ICD-10-PCS | Mod: CPTII,,, | Performed by: ANESTHESIOLOGY

## 2023-08-30 PROCEDURE — 93010 ELECTROCARDIOGRAM REPORT: CPT | Mod: ,,, | Performed by: INTERNAL MEDICINE

## 2023-08-30 PROCEDURE — 3077F PR MOST RECENT SYSTOLIC BLOOD PRESSURE >= 140 MM HG: ICD-10-PCS | Mod: CPTII,,, | Performed by: ANESTHESIOLOGY

## 2023-08-30 PROCEDURE — 99999 PR PBB SHADOW E&M-EST. PATIENT-LVL IV: CPT | Mod: PBBFAC,,,

## 2023-08-30 PROCEDURE — 3077F SYST BP >= 140 MM HG: CPT | Mod: CPTII,,, | Performed by: ANESTHESIOLOGY

## 2023-08-30 PROCEDURE — 1159F MED LIST DOCD IN RCRD: CPT | Mod: CPTII,,, | Performed by: ANESTHESIOLOGY

## 2023-08-30 PROCEDURE — 3079F PR MOST RECENT DIASTOLIC BLOOD PRESSURE 80-89 MM HG: ICD-10-PCS | Mod: CPTII,,, | Performed by: ANESTHESIOLOGY

## 2023-08-30 NOTE — PRE-PROCEDURE INSTRUCTIONS
To confirm, your doctor has instructed you: Surgery is scheduled for 9/7/2023.     Pre admit office will call the afternoon prior to surgery between 1PM and 3PM with arrival time.    Surgery will be at [facility],  The address is [address] [place] [address].      IMPORTANT INSTRUCTIONS!    Do not eat or drink after 12 midnight, including water. Do not smoke or use chewing tobacco after 12 midnight  OK to brush teeth, but no gum, candy, or mints!      *Take only these medicines with a small swallow of water-morning of surgery*     none       ____ Stop Aspirin, Ibuprofen, Motrin and Aleve at least 5-7 days before surgery, unless otherwise instructed by your doctor, or the nurse.   You MAY use Tylenol/acetaminophen until day of surgery.      ____  If you take diabetic medication, do NOT take morning of surgery unless instructed by Doctor. Metformin must be stopped 24 hrs prior to surgery time.       ____ Stop taking any Fish Oil supplements or Vitamins at least 5 days prior to surgery, unless instructed otherwise by your Doctor.       Please notify MD office if you have an active infection, currently taking antibiotics or received a vaccination within the past 7 days.    You may be required to provide a urine sample prior to procedure;   Please ask  for a specimen cup if you need to use the restroom prior to being called into pre-op.    Bathing Instructions: The night before surgery and the morning prior to coming to the hospital:    - Shower & rinse your body as usual with anti-bacterial Soap (Dial or Lever 2000)   -Hibiclens (if indicated) use AFTER anti-bacterial soap; 1 packet PM/1 packet in AM on surgical site only   -Do not use hibiclens on your head, face, or genitals.    -Do not wash with anti-bacterial soap after you use the hibiclens.    -Do not shave surgical site 5-7 days prior to surgery.    -Pubic hair 7 days prior to surgery (gyn pt's).      Pediatric patients do not  need to use anti-bacterial soap or Hibiclens.             After Bathing:   __ No powder, lotions, creams, or body spray to skin     __No deodorant for any breast procedure, PORT, or upper arm surgery     __ No makeup, mascara, nail polish or artificial nails       **SURGERY WILL BE CANCELLED IF ARTIFICIAL/NAIL POLISH IS PRESENT!!!**    __ Please remove all piercings and leave all jewelry at home.    **SURGERY WILL BE CANCELLED IF PIERCINGS ARE PRESENT!!!**      __ Dentures, Hearing Aids and Contact Lens need to be removed prior to the start of surgery.      __ Wear clean, loose-fitting clothing. Allow for dressings/bandages/surgical equipment     __ You must have transportation, and they MUST stay the entire time.         [facility] Visitor/Ride Policy:   Only 1 adult allowed (over the age of 18) to accompany you and MUST STAY through the entire length of admission.     -Must have a ride home from a responsible adult that you know and trust.    -Medical Transport, Uber or Lyft can only be used if patient has a responsible adult to accompany them during ride home.  Pediatric patients are encouraged to have 2 adults accompany them to the surgery center.     ~Your ride MUST STAY the entire time until you are discharged~        Post-Op Instructions: You will receive surgery post-op instructions by your Discharge Nurse prior to going home.     Surgical Site Infection:   Prevention of surgical site infections:   -Keep incisions clean and dry.   -Do not soak/submerge incisions in water until completely healed.   -Do not apply lotions, powders, creams, or deodorants to site.   -Always make sure hands are cleaned with antibacterial soap/ alcohol-based  prior to touching the surgical site.       Signs and symptoms:               -Redness and pain around the area where you had surgery               -Drainage of cloudy fluid from your surgical wound               -Fever over 100.4 or chills     >>>Call Surgeon  office/on-call Surgeon if you experience any of these signs & symptoms post-surgery @ [phone].       *Please Call [facility] Pre-Admit Department for surgery instruction questions:  [phone]    *Payment questions:  [phone]    *Billing questions:  [phone]

## 2023-08-30 NOTE — ASSESSMENT & PLAN NOTE
- blood pressure elevated at systolic 170 initially in clinic, repeat 159/91.  Patient has been off of her labetalol times approximately 1 month  - reports blood pressure of 135 systolic only with primary care last week  - I encouraged patient to resume home medication and monitor blood pressures, I will follow-up with patient next week with phone call to assess blood pressures  - EKG today shows normal sinus rhythm  - keep follow-up with primary care

## 2023-08-30 NOTE — ASSESSMENT & PLAN NOTE
- pt presents at the request of Dr. Dr Horta who plans on performing a CTR on 9/7/23  - Known risk factors for perioperative complications: HTN , BMI 35    Difficulty with intubation is not anticipated.  No prior airway history for review    Cardiac Risk Estimation:  Per Revised Cardiac Risk Index patient is a Class I  risk with a 3.9% risk of a major cardiac event.      1.) Preoperative workup as follows: ECG, hemoglobin, hematocrit, electrolytes, creatinine, glucose, liver function studies.  2.) Change in medication regimen before surgery: Hold all NSAIDs 7 days preop, last dose of Adipex was 8/8/23.  3.) Prophylaxis for cardiac events with perioperative beta-blockers: continue home Beta blocker labetalol  200mg .  4.) Invasive hemodynamic monitoring perioperatively: not indicated.  5.) Deep vein thrombosis prophylaxis postoperatively: intermittent pneumatic compression boots and regimen to be chosen by surgical team.  6.) Surveillance for postoperative MI with ECG immediately postoperatively and on postoperati ve days 1 and 2 AND troponin levels 24 hours postoperatively and on day 4 or hospital discharge (whichever comes first): not indicated.  7.) Current medications which may produce withdrawal symptoms if withheld perioperatively: none  8.) Other measures: None.

## 2023-08-30 NOTE — PROGRESS NOTES
Preoperative History and Physical  [facility]                                                                   Chief Complaint: Preoperative evaluation     History of Present Illness:      Radha Bowens is a 38 y.o. female who presents to the office today for a preoperative consultation at the request of Dr. Horta  who plans on performing  R CTR  on September 7.     Functional Status:      The patient is able to climb a flight of stairs. The patient is able to ambulate  without difficulty. The patient's functional status is not affected by the surgical problem. The patient's functional status is not affected by shortness of breath, chest pain, dyspnea on exertion and fatigue.  PT no CP no SOB .   MET score greater than 4    Patient Anesthesia History:      History of Malignant Hyperthermia: no  History of Pseudocholinesterase Deficiency: no  History PONV: no  History of difficult intubation: no  History of delayed emergence: no    Family Anesthesia History:      History of Malignant Hyperthermia: no  History of Pseudocholinesterase Deficiency: no     Past Medical History:      Past Medical History:   Diagnosis Date    Anxiety     Depression     Hypertension         Past Surgical History:      Past Surgical History:   Procedure Laterality Date    ABDOMINOPLASTY          Social History:      Social History     Socioeconomic History    Marital status: Unknown   Tobacco Use    Smoking status: Never     Passive exposure: Never    Smokeless tobacco: Never   Substance and Sexual Activity    Alcohol use: Not Currently    Drug use: Never    Sexual activity: Yes     Partners: Male        Family History:      Family History   Problem Relation Age of Onset    Stroke Mother     Hypertension Mother     Kidney failure Father     Diabetes Sister     Hypertension Sister     Hypertension Sister     Hypertension Brother     Cancer Maternal Grandmother     Cancer Paternal  Grandmother     Cancer Paternal Grandfather        Allergies:      Review of patient's allergies indicates:  No Known Allergies    Medications:      Current Outpatient Medications   Medication Sig    ALPRAZolam (XANAX) 2 MG Tab Take 2 mg by mouth 3 (three) times daily.    dicyclomine (BENTYL) 20 mg tablet TAKE 1 TABLET BY MOUTH EVERY MORNING AND 1 TABLET BEFORE BEDTIME    ibuprofen (ADVIL,MOTRIN) 800 MG tablet Take 1 tablet (800 mg total) by mouth every 8 (eight) hours as needed for Pain.    tiZANidine (ZANAFLEX) 4 MG tablet Take 4 mg by mouth 2 (two) times daily.    azithromycin (Z-CELESTINE) 250 MG tablet     fluticasone propionate (FLONASE) 50 mcg/actuation nasal spray fluticasone propionate Take 1 spray(s) (Intranasal - bilaterally) 2 times per day 20221101 spray,suspension 2 times per day Intranasal No set duration recorded No set duration amount recorded active 50 mcg/actuation    methocarbamoL (ROBAXIN) 500 MG Tab TAKE 1 TABLET BY MOUTH EVERY MORNING AND BEFORE BEDTIME    nabumetone (RELAFEN) 500 MG tablet Take 500 mg by mouth 2 (two) times daily.    oxyCODONE-acetaminophen (PERCOCET) 5-325 mg per tablet Take 1 tablet by mouth 3 (three) times daily.    OZEMPIC 0.25 mg or 0.5 mg (2 mg/3 mL) pen injector INJECT 0.5 MG INTO THE SKIN WEEKLY    phentermine (ADIPEX-P) 37.5 mg tablet Take 37.5 mg by mouth.     No current facility-administered medications for this visit.       Vitals:      Vitals:    08/30/23 1114   BP: (!) 170/87   Pulse: 64   Resp: 14   Temp: 97.7 °F (36.5 °C)   Repeat 159/91    Review of Systems:        Constitutional: Negative for fever, , weight loss, malaise/fatigue and diaphoresis. Chills x 6 months   HENT: Negative for hearing loss, ear pain, nosebleeds, congestion, sore throat, neck pain, tinnitus and ear discharge.    Eyes: Negative for blurred vision, double vision, photophobia, pain, discharge and redness.   Respiratory: Negative for cough, hemoptysis, sputum production, shortness of breath,  wheezing and stridor.    Cardiovascular: Negative for chest pain, palpitations, orthopnea, claudication, leg swelling and PND.   Gastrointestinal: Negative for heartburn, nausea, vomiting, abdominal pain, diarrhea, constipation, blood in stool and melena.   Genitourinary: Negative for dysuria, urgency, frequency, hematuria and flank pain.   Musculoskeletal: Negative for myalgias,, joint pain and falls. + back pain ; R hand pain   Skin: Negative for itching and rash.   Neurological: Negative for dizziness, tingling, tremors, sensory change, speech change, focal weakness, seizures, loss of consciousness, and headaches. R foot numbness ; LE weakness   Endo/Heme/Allergies: positive  for environmental allergies   Psychiatric/Behavioral: Negative for depression. + for anxiety   Physical Exam:      Constitutional: Appears well-developed, well-nourished and in no acute distress.  Patient is oriented to person, place, and time.   Head: Normocephalic and atraumatic. Mucous membranes moist.  Neck: Neck supple   Cardiovascular: Normal rate and regular rhythm.  S1 S2 appreciated by ascultation.  Pulmonary/Chest: Effort normal and clear to auscultation bilaterally. No respiratory distress.   Abdomen:  non-distended.   Neurological: Patient is alert and oriented to person, place and time. Moves all extremities.  Skin: Warm and dry. No lesions.  Extremities: No clubbing, cyanosis or edema.    Laboratory data:      Labs 23 reviewed     Predictors of intubation difficulty:       Morbid obesity? Bmi 35   Anatomically abnormal facies? no   Prominent incisors? no   Receding mandible? no   Short, thick neck? no   Neck range of motion: normal   Dentition:  intact   Based on the Modified Mallampati, patient is a mallampati score: II (hard and soft palate, upper portion of tonsils anduvula visible)    Cardiographics:      EC23  Echocardiogram:  none    Imaging:      Chest x-ray:  none      Assessment and Plan:      Pre-op  exam  - pt presents at the request of Dr. Dr Horta who plans on performing a CTR on 9/7/23  - Known risk factors for perioperative complications: HTN , BMI 35    Difficulty with intubation is not anticipated.  No prior airway history for review    Cardiac Risk Estimation:  Per Revised Cardiac Risk Index patient is a Class I  risk with a 3.9% risk of a major cardiac event.      1.) Preoperative workup as follows: ECG, hemoglobin, hematocrit, electrolytes, creatinine, glucose, liver function studies.  2.) Change in medication regimen before surgery: Hold all NSAIDs 7 days preop, last dose of Adipex was 8/8/23.  3.) Prophylaxis for cardiac events with perioperative beta-blockers: continue home Beta blocker labetalol  200mg .  4.) Invasive hemodynamic monitoring perioperatively: not indicated.  5.) Deep vein thrombosis prophylaxis postoperatively: intermittent pneumatic compression boots and regimen to be chosen by surgical team.  6.) Surveillance for postoperative MI with ECG immediately postoperatively and on postoperati ve days 1 and 2 AND troponin levels 24 hours postoperatively and on day 4 or hospital discharge (whichever comes first): not indicated.  7.) Current medications which may produce withdrawal symptoms if withheld perioperatively: none  8.) Other measures: None.      Benign essential hypertension  - blood pressure elevated at systolic 170 initially in clinic, repeat 159/91.  Patient has been off of her labetalol times approximately 1 month  - reports blood pressure of 135 systolic only with primary care last week  - I encouraged patient to resume home medication and monitor blood pressures, I will follow-up with patient next week with phone call to assess blood pressures  - EKG today shows normal sinus rhythm  - keep follow-up with primary care    Chronic bilateral low back pain without sciatica  - fall in March  - to potentially have steroid injections postoperatively  - PT improving sx     Anxiety  -  patient has Xanax as needed, may continue in the perioperative period     Carpal tunnel syndrome  - right carpal tunnel syndrome with scheduled release on September 7th

## 2023-08-30 NOTE — DISCHARGE INSTRUCTIONS
To confirm, your doctor has instructed you: Surgery is scheduled for 9/7/2023.     Pre admit office will call the afternoon prior to surgery between 1PM and 3PM with arrival time.    Surgery will be at [facility],  The address is [address]. [place] [address].      IMPORTANT INSTRUCTIONS!    Do not eat or drink after 12 midnight, including water. Do not smoke or use chewing tobacco after 12 midnight  OK to brush teeth, but no gum, candy, or mints!      *Take only these medicines with a small swallow of water-morning of surgery*     none       ____ Stop Aspirin, Ibuprofen, Motrin and Aleve at least 5-7 days before surgery, unless otherwise instructed by your doctor, or the nurse.   You MAY use Tylenol/acetaminophen until day of surgery.      ____  If you take diabetic medication, do NOT take morning of surgery unless instructed by Doctor. Metformin must be stopped 24 hrs prior to surgery time.       ____ Stop taking any Fish Oil supplements or Vitamins at least 5 days prior to surgery, unless instructed otherwise by your Doctor.       Please notify MD office if you have an active infection, currently taking antibiotics or received a vaccination within the past 7 days.    You may be required to provide a urine sample prior to procedure;   Please ask  for a specimen cup if you need to use the restroom prior to being called into pre-op.    Bathing Instructions: The night before surgery and the morning prior to coming to the hospital:    - Shower & rinse your body as usual with anti-bacterial Soap (Dial or Lever 2000)   -Hibiclens (if indicated) use AFTER anti-bacterial soap; 1 packet PM/1 packet in AM on surgical site only   -Do not use hibiclens on your head, face, or genitals.    -Do not wash with anti-bacterial soap after you use the hibiclens.    -Do not shave surgical site 5-7 days prior to surgery.    -Pubic hair 7 days prior to surgery (gyn pt's).      Pediatric patients do not  need to use anti-bacterial soap or Hibiclens.             After Bathing:   __ No powder, lotions, creams, or body spray to skin     __No deodorant for any breast procedure, PORT, or upper arm surgery     __ No makeup, mascara, nail polish or artificial nails       **SURGERY WILL BE CANCELLED IF ARTIFICIAL/NAIL POLISH IS PRESENT!!!**    __ Please remove all piercings and leave all jewelry at home.    **SURGERY WILL BE CANCELLED IF PIERCINGS ARE PRESENT!!!**      __ Dentures, Hearing Aids and Contact Lens need to be removed prior to the start of surgery.      __ Wear clean, loose-fitting clothing. Allow for dressings/bandages/surgical equipment     __ You must have transportation, and they MUST stay the entire time.         [facility] Visitor/Ride Policy:   Only 1 adult allowed (over the age of 18) to accompany you and MUST STAY through the entire length of admission.     -Must have a ride home from a responsible adult that you know and trust.    -Medical Transport, Uber or Lyft can only be used if patient has a responsible adult to accompany them during ride home.  Pediatric patients are encouraged to have 2 adults accompany them to the surgery center.     ~Your ride MUST STAY the entire time until you are discharged~        Post-Op Instructions: You will receive surgery post-op instructions by your Discharge Nurse prior to going home.     Surgical Site Infection:   Prevention of surgical site infections:   -Keep incisions clean and dry.   -Do not soak/submerge incisions in water until completely healed.   -Do not apply lotions, powders, creams, or deodorants to site.   -Always make sure hands are cleaned with antibacterial soap/ alcohol-based  prior to touching the surgical site.       Signs and symptoms:               -Redness and pain around the area where you had surgery               -Drainage of cloudy fluid from your surgical wound               -Fever over 100.4 or chills     >>>Call Surgeon  office/on-call Surgeon if you experience any of these signs & symptoms post-surgery @ [phone].       *Please Call [facility] Pre-Admit Department for surgery instruction questions:  [phone]    *Payment questions:  [phone]    *Billing questions:  [phone]

## 2023-09-06 ENCOUNTER — ANESTHESIA EVENT (OUTPATIENT)
Dept: SURGERY | Facility: HOSPITAL | Age: 38
End: 2023-09-06
Payer: MEDICAID

## 2023-09-06 ENCOUNTER — TELEPHONE (OUTPATIENT)
Dept: INTERNAL MEDICINE | Facility: CLINIC | Age: 38
End: 2023-09-06
Payer: MEDICAID

## 2023-09-06 NOTE — ANESTHESIA PREPROCEDURE EVALUATION
09/06/2023  Radha Bowens is a 38 y.o., female.    Past Medical History:   Diagnosis Date    Anxiety     Depression     Hypertension      Past Surgical History:   Procedure Laterality Date    ABDOMINOPLASTY         Pre-op Assessment    I have reviewed the Patient Summary Reports.    I have reviewed the NPO Status.   I have reviewed the Medications.     Review of Systems  Anesthesia Hx:  No problems with previous Anesthesia  History of prior surgery of interest to airway management or planning: Previous anesthesia: General Denies Family Hx of Anesthesia complications.   Denies Personal Hx of Anesthesia complications.   Social:  Non-Smoker    Hematology/Oncology:  Hematology Normal        Cardiovascular:   Hypertension    Pulmonary:  Pulmonary Normal    Renal/:  Renal/ Normal     Hepatic/GI:  Hepatic/GI Normal    Neurological:  Neurology Normal    Endocrine:  Obesity / BMI > 30  Psych:   Psychiatric History anxiety depression          Physical Exam  General: Well nourished    Airway:  Mallampati: II   Mouth Opening: Normal  TM Distance: Normal  Tongue: Normal  Neck ROM: Normal ROM    Dental:  Intact    Chest/Lungs:  Normal Respiratory Rate        Anesthesia Plan  Type of Anesthesia, risks & benefits discussed:    Anesthesia Type: MAC  Intra-op Monitoring Plan: Standard ASA Monitors  Post Op Pain Control Plan: multimodal analgesia and IV/PO Opioids PRN  Induction:  IV  Informed Consent: Informed consent signed with the Patient and all parties understand the risks and agree with anesthesia plan.  All questions answered.   ASA Score: 2  Day of Surgery Review of History & Physical: H&P Update referred to the surgeon/provider.    Ready For Surgery From Anesthesia Perspective.     .

## 2023-09-06 NOTE — TELEPHONE ENCOUNTER
Voicemail box full on cell number, left message on home phone number to follow-up with patient's blood pressure readings over the last week

## 2023-09-06 NOTE — TELEPHONE ENCOUNTER
Additional attempts to reach patient by phone to follow up for blood pressure check unable to leave voicemail on cell phone

## 2023-09-07 ENCOUNTER — HOSPITAL ENCOUNTER (OUTPATIENT)
Facility: HOSPITAL | Age: 38
Discharge: HOME | End: 2023-09-07
Attending: ORTHOPAEDIC SURGERY | Admitting: ORTHOPAEDIC SURGERY
Payer: MEDICAID

## 2023-09-07 ENCOUNTER — TELEPHONE (OUTPATIENT)
Dept: ORTHOPEDICS | Facility: CLINIC | Age: 38
End: 2023-09-07
Payer: MEDICAID

## 2023-09-07 ENCOUNTER — ANESTHESIA (OUTPATIENT)
Dept: SURGERY | Facility: HOSPITAL | Age: 38
End: 2023-09-07
Payer: MEDICAID

## 2023-09-07 ENCOUNTER — NURSE TRIAGE (OUTPATIENT)
Dept: ADMINISTRATIVE | Facility: CLINIC | Age: 38
End: 2023-09-07
Payer: MEDICAID

## 2023-09-07 VITALS
WEIGHT: 185.31 LBS | RESPIRATION RATE: 16 BRPM | BODY MASS INDEX: 34.1 KG/M2 | OXYGEN SATURATION: 100 % | HEART RATE: 69 BPM | SYSTOLIC BLOOD PRESSURE: 132 MMHG | DIASTOLIC BLOOD PRESSURE: 94 MMHG | TEMPERATURE: 98 F | HEIGHT: 62 IN

## 2023-09-07 DIAGNOSIS — G56.01 CARPAL TUNNEL SYNDROME OF RIGHT WRIST: Primary | ICD-10-CM

## 2023-09-07 DIAGNOSIS — G56.01 RIGHT CARPAL TUNNEL SYNDROME: ICD-10-CM

## 2023-09-07 DIAGNOSIS — G89.18 POST-OP PAIN: Primary | ICD-10-CM

## 2023-09-07 LAB
B-HCG UR QL: NEGATIVE
CTP QC/QA: YES

## 2023-09-07 PROCEDURE — 37000008 HC ANESTHESIA 1ST 15 MINUTES: Performed by: ORTHOPAEDIC SURGERY

## 2023-09-07 PROCEDURE — 36000706: Performed by: ORTHOPAEDIC SURGERY

## 2023-09-07 PROCEDURE — 64721 CARPAL TUNNEL SURGERY: CPT | Mod: RT,,, | Performed by: ORTHOPAEDIC SURGERY

## 2023-09-07 PROCEDURE — 63600175 PHARM REV CODE 636 W HCPCS: Performed by: NURSE ANESTHETIST, CERTIFIED REGISTERED

## 2023-09-07 PROCEDURE — 36000707: Performed by: ORTHOPAEDIC SURGERY

## 2023-09-07 PROCEDURE — D9220A PRA ANESTHESIA: ICD-10-PCS | Mod: ,,, | Performed by: NURSE ANESTHETIST, CERTIFIED REGISTERED

## 2023-09-07 PROCEDURE — 37000009 HC ANESTHESIA EA ADD 15 MINS: Performed by: ORTHOPAEDIC SURGERY

## 2023-09-07 PROCEDURE — 71000015 HC POSTOP RECOV 1ST HR: Performed by: ORTHOPAEDIC SURGERY

## 2023-09-07 PROCEDURE — 81025 URINE PREGNANCY TEST: CPT | Performed by: ORTHOPAEDIC SURGERY

## 2023-09-07 PROCEDURE — 63600175 PHARM REV CODE 636 W HCPCS

## 2023-09-07 PROCEDURE — 25000003 PHARM REV CODE 250: Performed by: ORTHOPAEDIC SURGERY

## 2023-09-07 PROCEDURE — 25000003 PHARM REV CODE 250: Performed by: NURSE ANESTHETIST, CERTIFIED REGISTERED

## 2023-09-07 PROCEDURE — D9220A PRA ANESTHESIA: Mod: ,,, | Performed by: NURSE ANESTHETIST, CERTIFIED REGISTERED

## 2023-09-07 PROCEDURE — 63600175 PHARM REV CODE 636 W HCPCS: Performed by: ANESTHESIOLOGY

## 2023-09-07 PROCEDURE — 64721 PR REVISE MEDIAN N/CARPAL TUNNEL SURG: ICD-10-PCS | Mod: RT,,, | Performed by: ORTHOPAEDIC SURGERY

## 2023-09-07 PROCEDURE — 71000033 HC RECOVERY, INTIAL HOUR: Performed by: ORTHOPAEDIC SURGERY

## 2023-09-07 RX ORDER — LIDOCAINE HYDROCHLORIDE 20 MG/ML
INJECTION, SOLUTION INFILTRATION; PERINEURAL
Status: DISCONTINUED
Start: 2023-09-07 | End: 2023-09-07 | Stop reason: HOSPADM

## 2023-09-07 RX ORDER — PROPOFOL 10 MG/ML
INJECTION, EMULSION INTRAVENOUS
Status: DISCONTINUED | OUTPATIENT
Start: 2023-09-07 | End: 2023-09-07

## 2023-09-07 RX ORDER — ONDANSETRON 2 MG/ML
INJECTION INTRAMUSCULAR; INTRAVENOUS
Status: DISCONTINUED | OUTPATIENT
Start: 2023-09-07 | End: 2023-09-07

## 2023-09-07 RX ORDER — CEFAZOLIN SODIUM 2 G/50ML
2 SOLUTION INTRAVENOUS
Status: COMPLETED | OUTPATIENT
Start: 2023-09-07 | End: 2023-09-07

## 2023-09-07 RX ORDER — LIDOCAINE HYDROCHLORIDE 20 MG/ML
INJECTION INTRAVENOUS
Status: DISCONTINUED | OUTPATIENT
Start: 2023-09-07 | End: 2023-09-07

## 2023-09-07 RX ORDER — ACETAMINOPHEN 325 MG/1
325 TABLET ORAL EVERY 6 HOURS PRN
COMMUNITY

## 2023-09-07 RX ORDER — FENTANYL CITRATE 50 UG/ML
INJECTION, SOLUTION INTRAMUSCULAR; INTRAVENOUS
Status: DISCONTINUED | OUTPATIENT
Start: 2023-09-07 | End: 2023-09-07

## 2023-09-07 RX ORDER — CHLORHEXIDINE GLUCONATE ORAL RINSE 1.2 MG/ML
10 SOLUTION DENTAL
Status: DISCONTINUED | OUTPATIENT
Start: 2023-09-07 | End: 2024-04-03

## 2023-09-07 RX ORDER — KETOROLAC TROMETHAMINE 10 MG/1
10 TABLET, FILM COATED ORAL 3 TIMES DAILY PRN
Qty: 10 TABLET | Refills: 0 | Status: SHIPPED | OUTPATIENT
Start: 2023-09-07 | End: 2023-09-14

## 2023-09-07 RX ORDER — MIDAZOLAM HYDROCHLORIDE 1 MG/ML
INJECTION INTRAMUSCULAR; INTRAVENOUS
Status: DISCONTINUED | OUTPATIENT
Start: 2023-09-07 | End: 2023-09-07

## 2023-09-07 RX ORDER — HYDROCODONE BITARTRATE AND ACETAMINOPHEN 5; 325 MG/1; MG/1
1 TABLET ORAL EVERY 4 HOURS PRN
Status: DISCONTINUED | OUTPATIENT
Start: 2023-09-07 | End: 2023-09-07 | Stop reason: HOSPADM

## 2023-09-07 RX ORDER — DEXMEDETOMIDINE HYDROCHLORIDE 100 UG/ML
INJECTION, SOLUTION INTRAVENOUS
Status: DISCONTINUED | OUTPATIENT
Start: 2023-09-07 | End: 2023-09-07

## 2023-09-07 RX ORDER — CHLORHEXIDINE GLUCONATE ORAL RINSE 1.2 MG/ML
10 SOLUTION DENTAL 2 TIMES DAILY
Status: DISCONTINUED | OUTPATIENT
Start: 2023-09-07 | End: 2023-09-07 | Stop reason: HOSPADM

## 2023-09-07 RX ORDER — LIDOCAINE HYDROCHLORIDE 20 MG/ML
INJECTION, SOLUTION EPIDURAL; INFILTRATION; INTRACAUDAL; PERINEURAL
Status: DISCONTINUED | OUTPATIENT
Start: 2023-09-07 | End: 2023-09-07 | Stop reason: HOSPADM

## 2023-09-07 RX ORDER — SODIUM CHLORIDE, SODIUM LACTATE, POTASSIUM CHLORIDE, CALCIUM CHLORIDE 600; 310; 30; 20 MG/100ML; MG/100ML; MG/100ML; MG/100ML
INJECTION, SOLUTION INTRAVENOUS CONTINUOUS
Status: ACTIVE | OUTPATIENT
Start: 2023-09-07

## 2023-09-07 RX ORDER — SODIUM CHLORIDE 0.9 % (FLUSH) 0.9 %
10 SYRINGE (ML) INJECTION
Status: DISCONTINUED | OUTPATIENT
Start: 2023-09-07 | End: 2023-09-07 | Stop reason: HOSPADM

## 2023-09-07 RX ORDER — HYDROCODONE BITARTRATE AND ACETAMINOPHEN 5; 325 MG/1; MG/1
1 TABLET ORAL EVERY 6 HOURS PRN
Qty: 15 TABLET | Refills: 0 | Status: SHIPPED | OUTPATIENT
Start: 2023-09-07 | End: 2023-09-13 | Stop reason: SDUPTHER

## 2023-09-07 RX ORDER — GABAPENTIN 300 MG/1
300 CAPSULE ORAL 2 TIMES DAILY
Qty: 60 CAPSULE | Refills: 1 | Status: SHIPPED | OUTPATIENT
Start: 2023-09-07 | End: 2023-09-07 | Stop reason: CLARIF

## 2023-09-07 RX ADMIN — ONDANSETRON 4 MG: 2 INJECTION INTRAMUSCULAR; INTRAVENOUS at 08:09

## 2023-09-07 RX ADMIN — PROPOFOL 25 MG: 10 INJECTION, EMULSION INTRAVENOUS at 08:09

## 2023-09-07 RX ADMIN — FENTANYL CITRATE 25 MCG: 50 INJECTION, SOLUTION INTRAMUSCULAR; INTRAVENOUS at 08:09

## 2023-09-07 RX ADMIN — LIDOCAINE HYDROCHLORIDE 50 MG: 20 INJECTION INTRAVENOUS at 08:09

## 2023-09-07 RX ADMIN — MIDAZOLAM HYDROCHLORIDE 2 MG: 1 INJECTION INTRAMUSCULAR; INTRAVENOUS at 08:09

## 2023-09-07 RX ADMIN — DEXMEDETOMIDINE HYDROCHLORIDE 4 MCG: 100 INJECTION, SOLUTION INTRAVENOUS at 08:09

## 2023-09-07 RX ADMIN — FENTANYL CITRATE 50 MCG: 50 INJECTION, SOLUTION INTRAMUSCULAR; INTRAVENOUS at 08:09

## 2023-09-07 RX ADMIN — SODIUM CHLORIDE, POTASSIUM CHLORIDE, SODIUM LACTATE AND CALCIUM CHLORIDE: 600; 310; 30; 20 INJECTION, SOLUTION INTRAVENOUS at 07:09

## 2023-09-07 RX ADMIN — CEFAZOLIN SODIUM 2 G: 2 SOLUTION INTRAVENOUS at 08:09

## 2023-09-07 NOTE — DISCHARGE INSTRUCTIONS
Nozin Instructions  Goal: the goal of Nozin is to reduce the risk of post-procedural infections by bacteria in the nasal cavity. Think of it as hand  for your nose.    How to use:    1. Shake Nozin bottle well    2. Take a cotton swab and apply 4 drops to one tip    3. Insert cotton tip into one nostril, being sure not to go deeper into nose than tip of the swab.    4. Swab nostril 6 times counterclockwise and 6 times clockwise. Make sure to swab the inside front pocket of the nostril.    5. Take swab out and apply 2 drops to the same cotton tip. Repeat steps 3 and 4 in the other nostril.        Do steps 1-5 twice a day for 7 days.        After Hand Surgery  After surgery, the better you take care of yourself--especially your hand--the sooner it will heal. Follow your surgeons instructions. Try not to bump your hand, and dont move or lift anything while youre still wearing bandages, a splint, or a cast.  Care for your hand    Keep your hand elevated above heart level as much as possible for the first several days after surgery. This helps reduce swelling and pain.  To help prevent infection and speed healing, take care not to get your cast or bandages wet.  Keep dressing clean, dry, & intact until your follow up appointment.   Relieve pain as directed  Your surgeon may prescribe pain medicine or suggest you take an anti-inflammatory medicine. You might also be instructed to apply ice (or another cold source) to your hand. If you use ice cubes, put them in a plastic bag and rest it on top of your bandages. Leave the cold source on your hand for as long as its comfortable. Do this several times a day for the first few days after surgery. It may take several minutes before you can feel the cold through the cast or bandages.  Follow up with your surgeon  During a follow-up visit after surgery, your surgeon will check your progress. The stitches, bandages, splint, or cast may be removed. A new cast or splint  may be placed. If your hand has healed enough, your surgeon may prescribe exercises.  Do prescribed hand exercises  Your surgeon may recommend that you do exercises. These may be done under the guidance of a physical or occupational therapist. The exercises strengthen your hand, help you regain flexibility, and restore proper function. Do the exercises as advised.  Call your surgeon if you have...  A fever higher than 100.4°F (38.0°C) taken by mouth  Side effects from your medicine, such as prolonged nausea  A wet or loose dressing, or a dressing that is too tight  Excessive bleeding  Increased, ongoing pain or numbness  Signs of infection (such as drainage, warmth, or redness) at the incision site   Date Last Reviewed: 11/11/2015  © 1921-0805 [employer]. [address] [place] [address], [place] [address]. All rights reserved. This information is not intended as a substitute for professional medical care. Always follow your healthcare professional's instructions.

## 2023-09-07 NOTE — OP NOTE
[facility]  Orthopedic Surgery  Operative Note    SUMMARY     Date of Procedure: 9/7/2023   Assistant: None    Procedure: Procedure(s) (LRB):  RELEASE, CARPAL TUNNEL (Right)       Surgeon(s) and Role:     * Waylon Horta MD - Primary    Assisting Surgeon: None    Pre-Operative Diagnosis:  Right carpal tunnel syndrome      Post-Operative Diagnosis:  Right carpal tunnel syndrome    Anesthesia:  Local with sedation    Technical Procedures Used:  right carpal tunnel release    Description of the Findings of the Procedure:  The patient was taken to the operating room where 10 cc of 2% plain lidocaine was used for local anesthetic and was administered.  After exsanguination of the extremity a proximal tourniquet was inflated to 250 mm of mercury.  Satisfactory anesthesia had been achieved the right hand was prepped and draped in the usual sterile fashion.  The patient did receive 2 g of Ancef intravenously preoperatively.  At this time a longitudinal incision was made in line with the 4th ray over the transverse carpal ligament.  The incision was extended using blunt and sharp dissection under 3.5 loupe magnification.  The transverse carpal ligament was identified and sharply incised under direct vision.  A complete release was performed and verified by the surgeon's small finger both proximally and distally.  No additional pathology was encountered.  Satisfactory release had been achieved skin was closed using horizontal mattress 4 0 nylon suture.  Xeroform gauze 4x4s and 2 ABD pads were over wrapped with 3 in gauze dressing.  The patient tolerated the procedure well and was transferred to the recovery room in satisfactory condition.      Complications: No    Estimated Blood Loss (EBL): 5cc                        Condition: Good    Disposition: PACU - hemodynamically stable.    Attestation: I was present and scrubbed for the entire procedure.

## 2023-09-07 NOTE — ANESTHESIA POSTPROCEDURE EVALUATION
Anesthesia Post Evaluation    Patient: Radha Bowens    Procedure(s) Performed: Procedure(s) (LRB):  RELEASE, CARPAL TUNNEL (Right)    Final Anesthesia Type: MAC      Patient location during evaluation: PACU  Patient participation: Yes- Able to Participate  Level of consciousness: awake  Post-procedure vital signs: reviewed and stable  Pain management: adequate  Airway patency: patent    PONV status at discharge: No PONV  Anesthetic complications: no      Cardiovascular status: stable  Respiratory status: unassisted  Hydration status: euvolemic  Follow-up not needed.          Vitals Value Taken Time   /94 09/07/23 0904   Temp 36.7 °C (98.1 °F) 09/07/23 0839   Pulse 69 09/07/23 0904   Resp 28 09/07/23 0904   SpO2 100 % 09/07/23 0904   Vitals shown include unvalidated device data.      No case tracking events are documented in the log.      Pain/Shonda Score: Shonda Score: 9 (9/7/2023  9:00 AM)

## 2023-09-07 NOTE — TELEPHONE ENCOUNTER
----- Message from Valerie Wing LPN sent at 9/7/2023  1:59 PM CDT -----  Contact: pt- [phone]    ----- Message -----  From: Katelin Caceres  Sent: 9/7/2023   1:56 PM CDT  To: Rula Arreguin    Good afternoon,    Patient states her hand pain is at a 10 from her surgery today. Patient is requesting a call back ASAP.    Thank you,  Katelin ELLIS  [facility]

## 2023-09-07 NOTE — LETTER
September 7, 2023         [address] [facility] [facility] [address]  Phone: [phone]  Fax: [phone]       Patient: Radha Bowens   YOB: 1985  Date of Visit: 09/07/2023    To Whom It May Concern:    Glen Bowens  was at [facility] on 09/07/2023 for surgery with Dr. Horta. Please excuse from 9/6/2023-9/07/2023 for surgery and surgery preparations. If you have any questions or concerns, or if I can be of further assistance, please do not hesitate to contact me.    Sincerely,    Aliyah Ivey RN/Waylon Horta MD

## 2023-09-07 NOTE — TRANSFER OF CARE
"Anesthesia Transfer of Care Note    Patient: Radha Bowens    Procedure(s) Performed: Procedure(s) (LRB):  RELEASE, CARPAL TUNNEL (Right)    Patient location: PACU    Anesthesia Type: MAC    Transport from OR: Transported from OR on room air with adequate spontaneous ventilation    Post pain: adequate analgesia    Post assessment: no apparent anesthetic complications and tolerated procedure well    Post vital signs: stable    Level of consciousness: awake, alert and oriented    Nausea/Vomiting: no nausea/vomiting    Complications: none    Transfer of care protocol was followed      Last vitals:   Visit Vitals  /77 (BP Location: Right arm, Patient Position: Sitting)   Pulse 71   Temp 36.2 °C (97.2 °F) (Temporal)   Resp 17   Ht 5' 2" (1.575 m)   Wt 84.1 kg (185 lb 4.8 oz)   LMP 09/07/2023   SpO2 100%   Breastfeeding No   BMI 33.89 kg/m²     "

## 2023-09-07 NOTE — DISCHARGE SUMMARY
[facility]  Discharge Note  Short Stay    Procedure(s) (LRB):  RELEASE, CARPAL TUNNEL (Right)      OUTCOME: Patient tolerated treatment/procedure well without complication and is now ready for discharge.    DISPOSITION: Home or Self Care    FINAL DIAGNOSIS:  Right carpal tunnel syndrome    FOLLOWUP: In clinic    DISCHARGE INSTRUCTIONS:    Discharge Procedure Orders   Diet general     Call MD for:  temperature >100.4     Call MD for:  persistent nausea and vomiting     Call MD for:  severe uncontrolled pain     Call MD for:  difficulty breathing, headache or visual disturbances     Call MD for:  redness, tenderness, or signs of infection (pain, swelling, redness, odor or green/yellow discharge around incision site)     Call MD for:  hives     Call MD for:  persistent dizziness or light-headedness     Call MD for:  extreme fatigue        TIME SPENT ON DISCHARGE:  20 minutes

## 2023-09-07 NOTE — TELEPHONE ENCOUNTER
2:18PM 9/7/23  Spoke with pt via phone call. She states that her hand pain is severe and not controlled with the Norco. Offered adding gabapentin and toradol to her pain regimen. Pt states that she was told by another physician to not take Xanax and gabapentin together. She is agreeable to taking toradol TID PRN pain for 2 days with the norco for pain control. She reports that the bandage is not too tight. Reminded pt of first post op visit with me 9/12/23. She was thankful for the call.    Lee Ann Woods PA-C   [facility]         Pain is 10/10. Pt had surgery today. Pt stated she was told to call if pain medication does not work and she stated it is not working. Pt stated she can not go to sleep. Rt arm. Pt stated it feels like her wrist been cut off. Care advice recommend pt receive a call from MD office within 1 hour. Please call and advise.   Reason for Disposition   SEVERE post-op pain (e.g., excruciating, pain scale 8-10) that is not controlled with pain medications    Additional Information   Negative: Sounds like a life-threatening emergency to the triager   Negative: Bright red, wide-spread, sunburn-like rash   Negative: SEVERE headache and after spinal (epidural) anesthesia   Negative: Vomiting and persists > 4 hours   Negative: Vomiting and abdomen looks much more swollen than usual   Negative: Drinking very little and dehydration suspected (e.g., no urine > 12 hours, very dry mouth, very lightheaded)   Negative: Patient sounds very sick or weak to the triager   Negative: Sounds like a serious complication to the triager   Negative: Fever > 100.4 F (38.0 C)   Negative: Caller has URGENT question and triager unable to answer question    Protocols used: Post-Op Symptoms and [id number]

## 2023-09-07 NOTE — TELEPHONE ENCOUNTER
Returned pt call in regards to post op pain. Informed patient that Lee Ann Woods PA-C sent her in TORADOL to help with the pain. Patient asked to be given a pain medication, informed patient that she is already prescribed a pain med by Dr. Horta (NORCO 5). Advised patient to try the toradol and to let us know if it does not help. PT vu

## 2023-09-11 ENCOUNTER — CLINICAL SUPPORT (OUTPATIENT)
Dept: REHABILITATION | Facility: HOSPITAL | Age: 38
End: 2023-09-11
Payer: MEDICAID

## 2023-09-11 ENCOUNTER — TELEPHONE (OUTPATIENT)
Dept: ORTHOPEDICS | Facility: CLINIC | Age: 38
End: 2023-09-11
Payer: MEDICAID

## 2023-09-11 DIAGNOSIS — M54.50 CHRONIC BILATERAL LOW BACK PAIN WITHOUT SCIATICA: Primary | ICD-10-CM

## 2023-09-11 DIAGNOSIS — M54.2 NECK PAIN: ICD-10-CM

## 2023-09-11 DIAGNOSIS — M25.511 ACUTE PAIN OF RIGHT SHOULDER: ICD-10-CM

## 2023-09-11 DIAGNOSIS — R29.898 UPPER EXTREMITY WEAKNESS: ICD-10-CM

## 2023-09-11 DIAGNOSIS — Z74.09 IMPAIRED FUNCTIONAL MOBILITY, BALANCE, AND ENDURANCE: ICD-10-CM

## 2023-09-11 DIAGNOSIS — G89.29 CHRONIC BILATERAL LOW BACK PAIN WITHOUT SCIATICA: Primary | ICD-10-CM

## 2023-09-11 DIAGNOSIS — R29.898 WEAKNESS OF BOTH LOWER EXTREMITIES: ICD-10-CM

## 2023-09-11 PROCEDURE — 97110 THERAPEUTIC EXERCISES: CPT | Mod: PN,CQ

## 2023-09-11 NOTE — TELEPHONE ENCOUNTER
Spoke to the patient an let her know that Dr. Rula Nance are both out of the office an that she can discuss it with Lee Ann tomorrow the patient verbalized understanding.       ----- Message from Echo Lew LPN sent at 9/11/2023 12:50 PM CDT -----  Regarding: Medication  Good afternoon! This patient is calling to see if she can get a refill of her narco as she only has 2 left. She is also wanting a call. Thank you

## 2023-09-12 ENCOUNTER — TELEPHONE (OUTPATIENT)
Dept: ORTHOPEDICS | Facility: CLINIC | Age: 38
End: 2023-09-12
Payer: MEDICAID

## 2023-09-12 NOTE — TELEPHONE ENCOUNTER
----- Message from Aditi Culp sent at 9/12/2023 11:06 AM CDT -----  Pt needs to reschedule missed appointment from 9/12/23. Please call [phone]. She would also like a refill on pain meds.

## 2023-09-12 NOTE — TELEPHONE ENCOUNTER
Called patient in regards to r/s appt she missed today. Appt was cancelled vis SMS and patient was under the impression she did not need the appt today since she had one on 9/21. Informed patient she still needed a post op 1 appt. Appt made for today at 3pm . Patient verbalized understanding, agreed to date/time/location of appt. Patient also request a refill on her pain medication, informed patient she can speak to the provider about this matter when she comes in for the appt.

## 2023-09-13 ENCOUNTER — OFFICE VISIT (OUTPATIENT)
Dept: ORTHOPEDICS | Facility: CLINIC | Age: 38
End: 2023-09-13
Payer: MEDICAID

## 2023-09-13 VITALS — BODY MASS INDEX: 34.12 KG/M2 | HEIGHT: 62 IN | WEIGHT: 185.44 LBS

## 2023-09-13 DIAGNOSIS — Z98.890 S/P CARPAL TUNNEL RELEASE: Primary | ICD-10-CM

## 2023-09-13 PROCEDURE — 3008F BODY MASS INDEX DOCD: CPT | Mod: CPTII,,,

## 2023-09-13 PROCEDURE — 99024 PR POST-OP FOLLOW-UP VISIT: ICD-10-PCS | Mod: ,,,

## 2023-09-13 PROCEDURE — 3008F PR BODY MASS INDEX (BMI) DOCUMENTED: ICD-10-PCS | Mod: CPTII,,,

## 2023-09-13 PROCEDURE — 1159F MED LIST DOCD IN RCRD: CPT | Mod: CPTII,,,

## 2023-09-13 PROCEDURE — 99024 POSTOP FOLLOW-UP VISIT: CPT | Mod: ,,,

## 2023-09-13 PROCEDURE — 1159F PR MEDICATION LIST DOCUMENTED IN MEDICAL RECORD: ICD-10-PCS | Mod: CPTII,,,

## 2023-09-13 PROCEDURE — 99999 PR PBB SHADOW E&M-EST. PATIENT-LVL III: ICD-10-PCS | Mod: PBBFAC,,,

## 2023-09-13 PROCEDURE — 99999 PR PBB SHADOW E&M-EST. PATIENT-LVL III: CPT | Mod: PBBFAC,,,

## 2023-09-13 PROCEDURE — 99213 OFFICE O/P EST LOW 20 MIN: CPT | Mod: PBBFAC

## 2023-09-13 RX ORDER — HYDROCODONE BITARTRATE AND ACETAMINOPHEN 5; 325 MG/1; MG/1
1 TABLET ORAL EVERY 8 HOURS PRN
Qty: 15 TABLET | Refills: 0 | Status: SHIPPED | OUTPATIENT
Start: 2023-09-13

## 2023-09-13 NOTE — PROGRESS NOTES
SUBJECTIVE:      Patient ID: Radha Bowens is a 38 y.o. female.    HPI: Ms. Bowens is here today for post-operative visit #1.  She is 6 days status post RELEASE, CARPAL TUNNEL (Right) by Dr. Horta on 9/7/23. She reports that she is still having pain in the palmar area. Pain is 9/10. She is taking the Norco as prescribed and requests a refill today. She took off the post op dressing at her house. She has been compliant with postop instructions and keeping the extremity dry. She denies fever, chills, and sweats since the time of the surgery.     Past Medical History:   Diagnosis Date    Anxiety     Depression     Hypertension      Past Surgical History:   Procedure Laterality Date    ABDOMINOPLASTY      CARPAL TUNNEL RELEASE Right 9/7/2023    Procedure: RELEASE, CARPAL TUNNEL;  Surgeon: Waylon Horta MD;  Location: [facility];  Service: Orthopedics;  Laterality: Right;  right carpal tunnel release     Family History   Problem Relation Age of Onset    Stroke Mother     Hypertension Mother     Kidney failure Father     Diabetes Sister     Hypertension Sister     Hypertension Sister     Hypertension Brother     Cancer Maternal Grandmother     Cancer Paternal Grandmother     Cancer Paternal Grandfather      Social History     Socioeconomic History    Marital status: Unknown   Tobacco Use    Smoking status: Never     Passive exposure: Never    Smokeless tobacco: Never   Substance and Sexual Activity    Alcohol use: Not Currently    Drug use: Never    Sexual activity: Yes     Partners: Male     Medication List with Changes/Refills   Current Medications    ACETAMINOPHEN (TYLENOL) 325 MG TABLET    Take 325 mg by mouth every 6 (six) hours as needed for Pain.    ALPRAZOLAM (XANAX) 2 MG TAB    Take 2 mg by mouth 3 (three) times daily.    AZITHROMYCIN (Z-CELESTINE) 250 MG TABLET        DICYCLOMINE (BENTYL) 20 MG TABLET    TAKE 1 TABLET BY MOUTH EVERY MORNING AND 1 TABLET BEFORE BEDTIME    FLUTICASONE PROPIONATE (FLONASE)  "50 MCG/ACTUATION NASAL SPRAY    fluticasone propionate Take 1 spray(s) (Intranasal - bilaterally) 2 times per day 20221101 spray,suspension 2 times per day Intranasal No set duration recorded No set duration amount recorded active 50 mcg/actuation    KETOROLAC (TORADOL) 10 MG TABLET    Take 1 tablet (10 mg total) by mouth 3 (three) times daily as needed for Pain.    METHOCARBAMOL (ROBAXIN) 500 MG TAB    TAKE 1 TABLET BY MOUTH EVERY MORNING AND BEFORE BEDTIME    NABUMETONE (RELAFEN) 500 MG TABLET    Take 500 mg by mouth 2 (two) times daily.    OXYCODONE-ACETAMINOPHEN (PERCOCET) 5-325 MG PER TABLET    Take 1 tablet by mouth 3 (three) times daily.    OZEMPIC 0.25 MG OR 0.5 MG (2 MG/3 ML) PEN INJECTOR    INJECT 0.5 MG INTO THE SKIN WEEKLY    PHENTERMINE (ADIPEX-P) 37.5 MG TABLET    Take 37.5 mg by mouth.    TIZANIDINE (ZANAFLEX) 4 MG TABLET    Take 4 mg by mouth 2 (two) times daily.   Changed and/or Refilled Medications    Modified Medication Previous Medication    HYDROCODONE-ACETAMINOPHEN (NORCO) 5-325 MG PER TABLET HYDROcodone-acetaminophen (NORCO) 5-325 mg per tablet       Take 1 tablet by mouth every 8 (eight) hours as needed for Pain.    Take 1 tablet by mouth every 6 (six) hours as needed for Pain.     Review of patient's allergies indicates:  No Known Allergies    OBJECTIVE:     Physical exam:    Vitals:    09/13/23 1307   Weight: 84.1 kg (185 lb 6.5 oz)   Height: 5' 2" (1.575 m)   PainSc:   9   PainLoc: Wrist     Vital signs are stable, patient is afebrile.  Patient is well dressed and well groomed, no acute distress.  Alert and oriented to person, place, and time.    Right UE:  Incision is clean, dry, and intact.   There is no erythema or exudate. There is no sign of any infection.   Mild swelling to hand  Mild ecchymosis locally  She is NVI.   Sutures in place.   2+ pulses noted.  Cap refill <2 seconds  Motor intact to hand    ASSESSMENT         Encounter Diagnosis   Name Primary?    S/P carpal tunnel release " Yes            6 days status post RELEASE, CARPAL TUNNEL (Right)    PLAN:           Radha was seen today for post-op evaluation.    Diagnoses and all orders for this visit:    S/P carpal tunnel release  -     HYDROcodone-acetaminophen (NORCO) 5-325 mg per tablet; Take 1 tablet by mouth every 8 (eight) hours as needed for Pain.      - PO instruction reviewed and provided to patient  - The incision was cleaned with hydrogen peroxide and normal saline. A sterile Band-Aid was applied.   - Patient may clean the incision daily as above.   - [place] refill sent to pharmacy. Discussed wean from q6h --> q8h, break pills in half  - carpal tunnel brace provided. Patient may use brace as needed for symptomatic relief.    - Patient should notify the office of any signs or symptoms of infection including fevers, erythema, purulent drainage, increasing pain.    - Follow up for suture removal     POST OPERATIVE INSTRUCTIONS - Visit #1    BANDAGES/DRESSING/BATHING:  We have removed the dressing, cleaned your incision, and put on a band-aid at your first post op visit today. You may clean the incision daily as we did today. Keep the incision clean and dry. When showering, you may cover the incision with a plastic bag/umbrella bag.   Do not use Neosporin or other topical ointments or creams on the incision. If you are concerned about any redness or drainage of the incisions, please call the office.    SWELLING  Some swelling of your arm, hand and fingers is normal. The swelling can be decreased by  elevating your arm on a few pillows when lying down. Swelling can be further controlled by cold therapy over the surgical site. Flexion/extension of the fingers (opening and closing your hands) will also help to relieve swelling and prevent stiffness.    ACTIVITY  You may use the hand and wrist as tolerated for light activity. You are encouraged to bend the wrist, elbow and fingers as soon as the initial surgical dressing is removed. Avoid  lifting, pushing, or pulling any object greater than 5-10 pounds for the first 10-14 days.     BRACE  Wear your brace or splint as directed.    MEDICATIONS  Take pain medicines exactly as directed.  If the doctor gave you a prescription medicine for pain, take it as prescribed.  If you are not taking a prescription pain medicine, take an over-the-counter medicine such as acetaminophen (Tylenol), ibuprofen (Advil, Motrin), or naproxen (Aleve) as long as you do not have an allergy or medical condition that prevents you from taking them.  Do not take two or more pain medicines at the same time unless the doctor told you to. Many pain medicines have acetaminophen, which is Tylenol. Too much acetaminophen (Tylenol) can be harmful.    FOLLOW -UP  You should be scheduled for a post-op appointment within the 10-14 days following surgery, at which time we will review your surgery, remove sutures and evaluate incisions, review your post-operative program and answer any of your questions.          [facility]on[facility]

## 2023-09-19 ENCOUNTER — CLINICAL SUPPORT (OUTPATIENT)
Dept: REHABILITATION | Facility: HOSPITAL | Age: 38
End: 2023-09-19
Payer: MEDICAID

## 2023-09-19 DIAGNOSIS — R29.898 UPPER EXTREMITY WEAKNESS: ICD-10-CM

## 2023-09-19 DIAGNOSIS — M54.2 NECK PAIN: ICD-10-CM

## 2023-09-19 DIAGNOSIS — M25.511 ACUTE PAIN OF RIGHT SHOULDER: ICD-10-CM

## 2023-09-19 DIAGNOSIS — G89.29 CHRONIC BILATERAL LOW BACK PAIN WITHOUT SCIATICA: Primary | ICD-10-CM

## 2023-09-19 DIAGNOSIS — R29.898 WEAKNESS OF BOTH LOWER EXTREMITIES: ICD-10-CM

## 2023-09-19 DIAGNOSIS — Z74.09 IMPAIRED FUNCTIONAL MOBILITY, BALANCE, AND ENDURANCE: ICD-10-CM

## 2023-09-19 DIAGNOSIS — M54.50 CHRONIC BILATERAL LOW BACK PAIN WITHOUT SCIATICA: Primary | ICD-10-CM

## 2023-09-19 PROCEDURE — 97110 THERAPEUTIC EXERCISES: CPT | Mod: PN

## 2023-09-19 NOTE — PROGRESS NOTES
"[facility]   Physical Therapy Treatment Note      Name: Radha Bowens  Clinic Number: [id number]    Therapy Diagnosis:   Encounter Diagnoses   Name Primary?    Chronic bilateral low back pain without sciatica Yes    Neck pain     Acute pain of right shoulder     Upper extremity weakness     Weakness of both lower extremities     Impaired functional mobility, balance, and endurance      Physician: Yamil Ashley MD    Visit Date: 9/19/2023    Physician Orders: PT Eval and Treat   Medical Diagnosis from Referral:               G62.9 (ICD-10-CM) - Neuropathy   G89.4 (ICD-10-CM) - Chronic pain syndrome   M79.18,G89.29 (ICD-10-CM) - Chronic myofascial pain   Evaluation Date: 8/15/2023  Authorization Period Expiration: 6/25/2023  Plan of Care Expiration: 11/15/2023  Visit # / Visits authorized: 2/ 20 (+eval)     PN DUE: 9/15/2023  PTA Visit #: 1/5      Time In: 10:40 AM (pt late)   Time Out: 11:15 AM  Total Billable Time: 35 minutes     Precautions: Standard    Subjective     Pt reports: Has some neck and back pain. Just recently had carpal tunnel surgery of left hand.     She was compliant with home exercise program.  Response to previous treatment: good  Functional change: none reported    Pain: /10  Location:  (R) LBP     Objective      Objective Measures updated at progress report unless specified.     Treatment     Billing all therapy as therapeutic exercise per [place] Medicaid guidelines    Radha received the treatments listed below:    (exercises performed today are bolded)    therapeutic exercises to develop strength, endurance, ROM, flexibility, posture, and core stabilization for 35 minutes including:  UE:   - UBE for increased UE ROM, strength, and endurance 3 min x 3 min   - (B) upper trap stretch 2 x 30"  - (B) levator scap stretch 2 x 30"  - Pectoralis stretch 2 x 30"  - Cat/cow x10  - (B) open book x 10      LE:  - Recumbent bike for increased LE ROM, strength, and endurance " "x 5 minutes  - Standing QL stretch 10" x 5  - 3-way forward flexion 5" x 5  - F4 LTR x10 ea  - Double knee to chest with SB 2 x 10  - (B) piriformis stretch 2 x 30"  - (B) side-lying hamstring stretch 2 x 30"  - Bridges on SB 3 x 10  - SLR x 10 ea  - LAQ 2 x10  - Seated Hamstring curls 2 x 10   - seated marches 2 x 10  - Gastroc stretch 3 x 30"    manual therapy techniques: Joint mobilizations, Manual traction, Myofacial release, Soft tissue Mobilization, and Friction Massage were applied to the: low back and neck for 10 minutes, including:  STM/TPR to (R) QL in side-lying   Manual Cervical Traction  C2-7 cervical mobs for extension    neuromuscular re-education activities to improve: Balance, Coordination, Kinesthetic, Sense, Proprioception, and Posture for 0 minutes. The following activities were included:  UE:   - Cat/cow x10  - (B) open book x 10   - Hip adduction squeeze 2 x 10   - Alternating isometrics 10 x 10"     LE:      therapeutic activities to improve functional performance for 10  minutes, including:  UE:     LE:   - DL heel raises 3 x 10  - Repeated Sit to stands 3 x 10    Patient Education and Home Exercises       Education provided:   - continue with HEP    Written Home Exercises Provided: Patient instructed to cont prior HEP. Exercises were reviewed and Radha was able to demonstrate them prior to the end of the session.  Radha demonstrated good  understanding of the education provided. See EMR under Patient Instructions for exercises provided during therapy sessions    Assessment   Patient tolerated therapy session good today. She presents with mild low back pain. Continued interventions targeting lumbar mobility and lower extremity and lumbar strengthening. She reports that she had relief of symptoms following therapy session.  Will inquire response at next session. Plan to continue to increase as tolerated.     Radha Is progressing well towards her goals.   Pt prognosis is Good.     Pt will " continue to benefit from skilled outpatient physical therapy to address the deficits listed in the problem list box on initial evaluation, provide pt/family education and to maximize pt's level of independence in the home and community environment.     Pt's spiritual, cultural and educational needs considered and pt agreeable to plan of care and goals.     Anticipated barriers to physical therapy: pain    Goals:   Short Term Goals: 5 weeks   Patient will demonstrate independence with HEP in order to progress toward functional independence  Pt will demonstrate improved pain by reports of less than or equal to 7/10 worst pain in bilateral lower back on the verbal rating scale in order to progress toward maximal functional ability and improve QOL.  Pt will demonstrate improved pain by reports of less than or equal to 7/10 worst pain in bilateral neck on the verbal rating scale in order to progress toward maximal functional ability and improve QOL.  Pt will improve cervical extension motion to greater than or equal to 60 degrees for improved range of motion for functional activities  Pt will improve Right cervical rotation motion to greater than or equal to 55 degrees for improved range of motion for functional activities  Pt will improve cervical side bending motion to greater than or equal to 40 degrees for improved range of motion for functional activities     Long Term Goals: 8-10 weeks   Patient will demonstrate improved function as indicated by a functional score of 55% on the FOTO.  Pt will demonstrate improved pain by reports of less than or equal to 5/10 worst pain on the verbal rating scale in order to progress toward maximal functional ability and improve QOL.  Pt will demonstrate improved pain by reports of less than or equal to 5/10 worst pain on the verbal rating scale in order to progress toward maximal functional ability and improve QOL.  Patient will improve cervical range of motion to within normal  limits with less than 5/10 pain for improved ease with driving tasks.   Patient will improve bilateral upper extremity strength to 4+/5 or greater for improved functional upper extremity strength  Patient will improve bilateral lower extremity strength to 4+/5 or greater for improved functional lower extremity strength    Plan   Plan of care Certification: 8/15/2023 to 11/15/2023.     Outpatient Physical Therapy 2 times weekly for 10 weeks to include the following interventions: Aquatic Therapy, Cervical/Lumbar Traction, Electrical Stimulation , Gait Training, Manual Therapy, Moist Heat/ Ice, Neuromuscular Re-ed, Orthotic Management and Training, Patient Education, Self Care, Therapeutic Activities, Therapeutic Exercise, and Ultrasound. And any other therapies and modalities as clinically indicated and appropriate, including but not limited to FDN and telehealth. Pt may be seen by PTA as a part of pt's care team    Chana Benson PT,DPT  9/19/2023

## 2023-09-21 ENCOUNTER — CLINICAL SUPPORT (OUTPATIENT)
Dept: REHABILITATION | Facility: HOSPITAL | Age: 38
End: 2023-09-21
Payer: MEDICAID

## 2023-09-21 DIAGNOSIS — R29.898 UPPER EXTREMITY WEAKNESS: ICD-10-CM

## 2023-09-21 DIAGNOSIS — G89.29 CHRONIC BILATERAL LOW BACK PAIN WITHOUT SCIATICA: Primary | ICD-10-CM

## 2023-09-21 DIAGNOSIS — Z74.09 IMPAIRED FUNCTIONAL MOBILITY, BALANCE, AND ENDURANCE: ICD-10-CM

## 2023-09-21 DIAGNOSIS — M54.50 CHRONIC BILATERAL LOW BACK PAIN WITHOUT SCIATICA: Primary | ICD-10-CM

## 2023-09-21 DIAGNOSIS — M25.511 ACUTE PAIN OF RIGHT SHOULDER: ICD-10-CM

## 2023-09-21 DIAGNOSIS — M54.2 NECK PAIN: ICD-10-CM

## 2023-09-21 DIAGNOSIS — R29.898 WEAKNESS OF BOTH LOWER EXTREMITIES: ICD-10-CM

## 2023-09-21 PROCEDURE — 97110 THERAPEUTIC EXERCISES: CPT | Mod: PN,CQ

## 2023-09-21 NOTE — PROGRESS NOTES
"[facility]   Physical Therapy Treatment Note      Name: Radha Bowens  Clinic Number: [id number]    Therapy Diagnosis:   Encounter Diagnoses   Name Primary?    Chronic bilateral low back pain without sciatica Yes    Neck pain     Acute pain of right shoulder     Upper extremity weakness     Weakness of both lower extremities     Impaired functional mobility, balance, and endurance      Physician: Yamil Ashley MD    Visit Date: 9/21/2023    Physician Orders: PT Eval and Treat   Medical Diagnosis from Referral:               G62.9 (ICD-10-CM) - Neuropathy   G89.4 (ICD-10-CM) - Chronic pain syndrome   M79.18,G89.29 (ICD-10-CM) - Chronic myofascial pain   Evaluation Date: 8/15/2023  Authorization Period Expiration: 6/25/2023  Plan of Care Expiration: 11/15/2023  Visit # / Visits authorized: 2/ 20 (+eval)     PN DUE: 9/15/2023  PTA Visit #: 1/5      Time In: 9:00 AM   Time Out: 9:45 AM  Total Billable Time: 45 minutes     Precautions: Standard    Subjective     Pt reports: Low back feels better but still has some neck and mid to upper back pain.     She was compliant with home exercise program.  Response to previous treatment: good  Functional change: none reported    Pain: /10  Location:  (R) LBP     Objective      Objective Measures updated at progress report unless specified.     Treatment     Billing all therapy as therapeutic exercise per [place] Medicaid guidelines    Radha received the treatments listed below:    (exercises performed today are bolded)    therapeutic exercises to develop strength, endurance, ROM, flexibility, posture, and core stabilization for 35 minutes including:  UE:   - UBE for increased UE ROM, strength, and endurance 3 min x 3 min   - (B) upper trap stretch 2 x 30"  - (B) levator scap stretch 2 x 30"  - Pectoralis stretch 2 x 30"  - Cat/cow x10  - (B) open book x 10   - Cat/cow x10  - Hip adduction squeeze 2 x 10   - Alternating isometrics 10 x 10"      " "  LE:  - Recumbent bike for increased LE ROM, strength, and endurance x 6 minutes  - Standing QL stretch 10" x 5  - 3-way forward flexion 5" x 5  - F4 LTR x10 ea  - Double knee to chest with SB 2 x 10  - (B) piriformis stretch 2 x 30"  - (B) side-lying hamstring stretch 2 x 30"  - Bridges on SB 3 x 10  - SLR x 10 ea  - DL heel raises 3 x 10  - Hip 3-way on foam, 2x10  - Repeated Sit to stands 3 x 10  - LAQ 2 x10  - Seated Hamstring curls 2 x 10   - seated marches 2 x 10  - Gastroc stretch 3 x 30"    manual therapy techniques: Joint mobilizations, Manual traction, Myofacial release, Soft tissue Mobilization, and Friction Massage were applied to the: low back and neck for 10 minutes, including:  STM/TPR to (R) QL in side-lying   Manual Cervical Traction  C2-7 cervical mobs for extension          Patient Education and Home Exercises       Education provided:   - continue with HEP    Written Home Exercises Provided: Patient instructed to cont prior HEP. Exercises were reviewed and Radha was able to demonstrate them prior to the end of the session.  Radha demonstrated good  understanding of the education provided. See EMR under Patient Instructions for exercises provided during therapy sessions    Assessment   Patient tolerated therapy session good today. She presents with neck and mid back pain.  Low back pain is better. Cervical mobs continues to be a good manual technique for her as it gives her improved pain free ROM. Will inquire response at next session. Plan to continue to increase as tolerated.     Radha Is progressing well towards her goals.   Pt prognosis is Good.     Pt will continue to benefit from skilled outpatient physical therapy to address the deficits listed in the problem list box on initial evaluation, provide pt/family education and to maximize pt's level of independence in the home and community environment.     Pt's spiritual, cultural and educational needs considered and pt agreeable to plan " of care and goals.     Anticipated barriers to physical therapy: pain    Goals:   Short Term Goals: 5 weeks   Patient will demonstrate independence with HEP in order to progress toward functional independence  Pt will demonstrate improved pain by reports of less than or equal to 7/10 worst pain in bilateral lower back on the verbal rating scale in order to progress toward maximal functional ability and improve QOL.  Pt will demonstrate improved pain by reports of less than or equal to 7/10 worst pain in bilateral neck on the verbal rating scale in order to progress toward maximal functional ability and improve QOL.  Pt will improve cervical extension motion to greater than or equal to 60 degrees for improved range of motion for functional activities  Pt will improve Right cervical rotation motion to greater than or equal to 55 degrees for improved range of motion for functional activities  Pt will improve cervical side bending motion to greater than or equal to 40 degrees for improved range of motion for functional activities     Long Term Goals: 8-10 weeks   Patient will demonstrate improved function as indicated by a functional score of 55% on the FOTO.  Pt will demonstrate improved pain by reports of less than or equal to 5/10 worst pain on the verbal rating scale in order to progress toward maximal functional ability and improve QOL.  Pt will demonstrate improved pain by reports of less than or equal to 5/10 worst pain on the verbal rating scale in order to progress toward maximal functional ability and improve QOL.  Patient will improve cervical range of motion to within normal limits with less than 5/10 pain for improved ease with driving tasks.   Patient will improve bilateral upper extremity strength to 4+/5 or greater for improved functional upper extremity strength  Patient will improve bilateral lower extremity strength to 4+/5 or greater for improved functional lower extremity strength    Plan   Plan of  care Certification: 8/15/2023 to 11/15/2023.     Outpatient Physical Therapy 2 times weekly for 10 weeks to include the following interventions: Aquatic Therapy, Cervical/Lumbar Traction, Electrical Stimulation , Gait Training, Manual Therapy, Moist Heat/ Ice, Neuromuscular Re-ed, Orthotic Management and Training, Patient Education, Self Care, Therapeutic Activities, Therapeutic Exercise, and Ultrasound. And any other therapies and modalities as clinically indicated and appropriate, including but not limited to FDN and telehealth. Pt may be seen by PTA as a part of pt's care team    Rafael Taylor PTA

## 2023-09-25 ENCOUNTER — CLINICAL SUPPORT (OUTPATIENT)
Dept: REHABILITATION | Facility: HOSPITAL | Age: 38
End: 2023-09-25
Payer: MEDICAID

## 2023-09-25 DIAGNOSIS — G89.29 CHRONIC BILATERAL LOW BACK PAIN WITHOUT SCIATICA: Primary | ICD-10-CM

## 2023-09-25 DIAGNOSIS — M54.2 NECK PAIN: ICD-10-CM

## 2023-09-25 DIAGNOSIS — M54.50 CHRONIC BILATERAL LOW BACK PAIN WITHOUT SCIATICA: Primary | ICD-10-CM

## 2023-09-25 DIAGNOSIS — R29.898 WEAKNESS OF BOTH LOWER EXTREMITIES: ICD-10-CM

## 2023-09-25 DIAGNOSIS — R29.898 UPPER EXTREMITY WEAKNESS: ICD-10-CM

## 2023-09-25 DIAGNOSIS — Z74.09 IMPAIRED FUNCTIONAL MOBILITY, BALANCE, AND ENDURANCE: ICD-10-CM

## 2023-09-25 DIAGNOSIS — M25.511 ACUTE PAIN OF RIGHT SHOULDER: ICD-10-CM

## 2023-09-25 PROCEDURE — 97110 THERAPEUTIC EXERCISES: CPT | Mod: PN,CQ

## 2023-09-25 NOTE — PROGRESS NOTES
"[facility]   Physical Therapy Treatment Note      Name: Radha Bowens  Clinic Number: [id number]    Therapy Diagnosis:   Encounter Diagnoses   Name Primary?    Chronic bilateral low back pain without sciatica Yes    Neck pain     Acute pain of right shoulder     Upper extremity weakness     Weakness of both lower extremities     Impaired functional mobility, balance, and endurance      Physician: Yamil Ashley MD    Visit Date: 9/25/2023    Physician Orders: PT Eval and Treat   Medical Diagnosis from Referral:               G62.9 (ICD-10-CM) - Neuropathy   G89.4 (ICD-10-CM) - Chronic pain syndrome   M79.18,G89.29 (ICD-10-CM) - Chronic myofascial pain   Evaluation Date: 8/15/2023  Authorization Period Expiration: 6/25/2023  Plan of Care Expiration: 11/15/2023  Visit # / Visits authorized: 6/ 20 (+eval)     PN DUE: 9/15/2023  PTA Visit #: 1/5      Time In: 9:00 AM   Time Out: 9:45 AM  Total Billable Time: 44 minutes     Precautions: Standard    Subjective     Pt reports: only neck pain today.     She was compliant with home exercise program.  Response to previous treatment: good  Functional change: none reported    Pain: /10  Location:  (R) LBP     Objective      Objective Measures updated at progress report unless specified.     Treatment     Billing all therapy as therapeutic exercise per [place] Medicaid guidelines    Radha received the treatments listed below:    (exercises performed today are bolded)    therapeutic exercises to develop strength, endurance, ROM, flexibility, posture, and core stabilization for 34 minutes including:  UE:   - UBE for increased UE ROM, strength, and endurance 3 min x 3 min   - (B) upper trap stretch 2 x 30"  - (B) levator scap stretch 2 x 30"  - Pectoralis stretch 3 x 30"  - Cat/cow x10  - (B) open book x 10   - Spidermans, RTB, 1x10  - Seated thoracic extensions over small ball, x30  - Cat/cow x10  - Hip adduction squeeze 2 x 10   - Alternating " "isometrics 10 x 10"        LE:  - Recumbent bike for increased LE ROM, strength, and endurance x 6 minutes  - Standing QL stretch 10" x 5  - 3-way forward flexion 5" x 5  - F4 LTR x10 ea  - Double knee to chest with SB 2 x 10  - (B) piriformis stretch 2 x 30"  - (B) side-lying hamstring stretch 2 x 30"  - Bridges on SB 3 x 10  - SLR x 10 ea  - DL heel raises 3 x 10  - Hip 3-way on foam, 2x10  - Repeated Sit to stands 3 x 10  - LAQ 2 x10  - Seated Hamstring curls 2 x 10   - seated marches 2 x 10  - Gastroc stretch 3 x 30"    manual therapy techniques: Joint mobilizations, Manual traction, Myofacial release, Soft tissue Mobilization, and Friction Massage were applied to the: low back and neck for 10 minutes, including:  STM/TPR to (R) QL in side-lying   Manual Cervical Traction  C2-7 cervical mobs for extension          Patient Education and Home Exercises       Education provided:   - continue with HEP    Written Home Exercises Provided: Patient instructed to cont prior HEP. Exercises were reviewed and Radha was able to demonstrate them prior to the end of the session.  Radha demonstrated good  understanding of the education provided. See EMR under Patient Instructions for exercises provided during therapy sessions    Assessment   Patient tolerated therapy session good today. She presents with neck pain and no back pain.  Low back pain continues to improve each visit. Continued cervical mobs.  Once R hand heals from her surgery we will work on more BUE strengthening. Will inquire response at next session. Plan to continue to increase as tolerated.     Radha Is progressing well towards her goals.   Pt prognosis is Good.     Pt will continue to benefit from skilled outpatient physical therapy to address the deficits listed in the problem list box on initial evaluation, provide pt/family education and to maximize pt's level of independence in the home and community environment.     Pt's spiritual, cultural and " educational needs considered and pt agreeable to plan of care and goals.     Anticipated barriers to physical therapy: pain    Goals:   Short Term Goals: 5 weeks   Patient will demonstrate independence with HEP in order to progress toward functional independence  Pt will demonstrate improved pain by reports of less than or equal to 7/10 worst pain in bilateral lower back on the verbal rating scale in order to progress toward maximal functional ability and improve QOL.  Pt will demonstrate improved pain by reports of less than or equal to 7/10 worst pain in bilateral neck on the verbal rating scale in order to progress toward maximal functional ability and improve QOL.  Pt will improve cervical extension motion to greater than or equal to 60 degrees for improved range of motion for functional activities  Pt will improve Right cervical rotation motion to greater than or equal to 55 degrees for improved range of motion for functional activities  Pt will improve cervical side bending motion to greater than or equal to 40 degrees for improved range of motion for functional activities     Long Term Goals: 8-10 weeks   Patient will demonstrate improved function as indicated by a functional score of 55% on the FOTO.  Pt will demonstrate improved pain by reports of less than or equal to 5/10 worst pain on the verbal rating scale in order to progress toward maximal functional ability and improve QOL.  Pt will demonstrate improved pain by reports of less than or equal to 5/10 worst pain on the verbal rating scale in order to progress toward maximal functional ability and improve QOL.  Patient will improve cervical range of motion to within normal limits with less than 5/10 pain for improved ease with driving tasks.   Patient will improve bilateral upper extremity strength to 4+/5 or greater for improved functional upper extremity strength  Patient will improve bilateral lower extremity strength to 4+/5 or greater for improved  functional lower extremity strength    Plan   Plan of care Certification: 8/15/2023 to 11/15/2023.     Outpatient Physical Therapy 2 times weekly for 10 weeks to include the following interventions: Aquatic Therapy, Cervical/Lumbar Traction, Electrical Stimulation , Gait Training, Manual Therapy, Moist Heat/ Ice, Neuromuscular Re-ed, Orthotic Management and Training, Patient Education, Self Care, Therapeutic Activities, Therapeutic Exercise, and Ultrasound. And any other therapies and modalities as clinically indicated and appropriate, including but not limited to FDN and telehealth. Pt may be seen by PTA as a part of pt's care team    Rafael Taylor PTA

## 2023-09-28 ENCOUNTER — TELEPHONE (OUTPATIENT)
Dept: ORTHOPEDICS | Facility: CLINIC | Age: 38
End: 2023-09-28
Payer: MEDICAID

## 2023-09-28 ENCOUNTER — CLINICAL SUPPORT (OUTPATIENT)
Dept: REHABILITATION | Facility: HOSPITAL | Age: 38
End: 2023-09-28
Payer: MEDICAID

## 2023-09-28 DIAGNOSIS — Z74.09 IMPAIRED FUNCTIONAL MOBILITY, BALANCE, AND ENDURANCE: ICD-10-CM

## 2023-09-28 DIAGNOSIS — M54.2 NECK PAIN: ICD-10-CM

## 2023-09-28 DIAGNOSIS — M25.511 ACUTE PAIN OF RIGHT SHOULDER: ICD-10-CM

## 2023-09-28 DIAGNOSIS — R29.898 WEAKNESS OF BOTH LOWER EXTREMITIES: ICD-10-CM

## 2023-09-28 DIAGNOSIS — G89.29 CHRONIC BILATERAL LOW BACK PAIN WITHOUT SCIATICA: Primary | ICD-10-CM

## 2023-09-28 DIAGNOSIS — R29.898 UPPER EXTREMITY WEAKNESS: ICD-10-CM

## 2023-09-28 DIAGNOSIS — M54.50 CHRONIC BILATERAL LOW BACK PAIN WITHOUT SCIATICA: Primary | ICD-10-CM

## 2023-09-28 PROCEDURE — 97110 THERAPEUTIC EXERCISES: CPT | Mod: PN,CQ

## 2023-09-28 NOTE — TELEPHONE ENCOUNTER
----- Message from Loly Uriarte sent at 9/28/2023  3:07 PM CDT -----  Pt called to ask when will stitches be removed,Please call back at [phone].Thanks

## 2023-09-28 NOTE — PROGRESS NOTES
"[facility]   Physical Therapy Treatment Note      Name: Radha Bowens  Clinic Number: [id number]    Therapy Diagnosis:   Encounter Diagnoses   Name Primary?    Chronic bilateral low back pain without sciatica Yes    Neck pain     Acute pain of right shoulder     Upper extremity weakness     Weakness of both lower extremities     Impaired functional mobility, balance, and endurance      Physician: Yamil Ashley MD    Visit Date: 9/28/2023    Physician Orders: PT Eval and Treat   Medical Diagnosis from Referral:               G62.9 (ICD-10-CM) - Neuropathy   G89.4 (ICD-10-CM) - Chronic pain syndrome   M79.18,G89.29 (ICD-10-CM) - Chronic myofascial pain   Evaluation Date: 8/15/2023  Authorization Period Expiration: 6/25/2023  Plan of Care Expiration: 11/15/2023  Visit # / Visits authorized: 7/ 20 (+eval)     PN DUE: 9/15/2023  PTA Visit #: 2/5      Time In: 9:20 AM   Time Out: 10:00 AM  Total Billable Time: 45 minutes     Precautions: Standard    Subjective     Pt reports: only low back pain.  She thinks it is from performing house chores.    She was compliant with home exercise program.  Response to previous treatment: good  Functional change: none reported    Pain: /10  Location:  (R) LBP     Objective      Objective Measures updated at progress report unless specified.     Treatment     Billing all therapy as therapeutic exercise per [place] Medicaid guidelines    Radha received the treatments listed below:    (exercises performed today are bolded)    therapeutic exercises to develop strength, endurance, ROM, flexibility, posture, and core stabilization for 35 minutes including:  UE:   - UBE for increased UE ROM, strength, and endurance 3 min x 3 min   - (B) upper trap stretch 2 x 30"  - (B) levator scap stretch 2 x 30"  - Pectoralis stretch 3 x 30"  - Cat/cow x10  - (B) open book x 10   - Spidermans, RTB, 1x10  - Seated thoracic extensions over small ball, x30  - Cat/cow x10  - Hip " "adduction squeeze 2 x 10   - Alternating isometrics 10 x 10"        LE:  - Recumbent bike for increased LE ROM, strength, and endurance x 6 minutes  - Standing QL stretch 10" x 5  - 3-way forward flexion 5" x 5  - F4 LTR x10 ea  - Double knee to chest with SB 2 x 10  - (B) piriformis stretch 2 x 30"  - (B) side-lying hamstring stretch 2 x 30"  - Bridges on SB 3 x 10  - SLR x 10 ea  - DL heel raises 3 x 10  - Hip 3-way on foam, 2x10  - Deadbugs, feet up, 2x10  - Repeated Sit to stands 3 x 10  - LAQ 2 x10  - Seated Hamstring curls 2 x 10   - seated marches 2 x 10  - Gastroc stretch 3 x 30"    manual therapy techniques: Joint mobilizations, Manual traction, Myofacial release, Soft tissue Mobilization, and Friction Massage were applied to the: low back and neck for 10 minutes, including:  STM/TPR to (R) QL in side-lying   Manual Cervical Traction  C2-7 cervical mobs for extension  3D axial separation in flexion (R)          Patient Education and Home Exercises       Education provided:   - continue with HEP    Written Home Exercises Provided: Patient instructed to cont prior HEP. Exercises were reviewed and Radha was able to demonstrate them prior to the end of the session.  Radha demonstrated good  understanding of the education provided. See EMR under Patient Instructions for exercises provided during therapy sessions    Assessment   Patient tolerated therapy session good today. She presents with only low back pain today.  Performed 3D axial separation in flexion which decreased back pain. Will inquire response at next session. Plan to continue to increase as tolerated.     Radha Is progressing well towards her goals.   Pt prognosis is Good.     Pt will continue to benefit from skilled outpatient physical therapy to address the deficits listed in the problem list box on initial evaluation, provide pt/family education and to maximize pt's level of independence in the home and community environment.     Pt's " spiritual, cultural and educational needs considered and pt agreeable to plan of care and goals.     Anticipated barriers to physical therapy: pain    Goals:   Short Term Goals: 5 weeks   Patient will demonstrate independence with HEP in order to progress toward functional independence  Pt will demonstrate improved pain by reports of less than or equal to 7/10 worst pain in bilateral lower back on the verbal rating scale in order to progress toward maximal functional ability and improve QOL.  Pt will demonstrate improved pain by reports of less than or equal to 7/10 worst pain in bilateral neck on the verbal rating scale in order to progress toward maximal functional ability and improve QOL.  Pt will improve cervical extension motion to greater than or equal to 60 degrees for improved range of motion for functional activities  Pt will improve Right cervical rotation motion to greater than or equal to 55 degrees for improved range of motion for functional activities  Pt will improve cervical side bending motion to greater than or equal to 40 degrees for improved range of motion for functional activities     Long Term Goals: 8-10 weeks   Patient will demonstrate improved function as indicated by a functional score of 55% on the FOTO.  Pt will demonstrate improved pain by reports of less than or equal to 5/10 worst pain on the verbal rating scale in order to progress toward maximal functional ability and improve QOL.  Pt will demonstrate improved pain by reports of less than or equal to 5/10 worst pain on the verbal rating scale in order to progress toward maximal functional ability and improve QOL.  Patient will improve cervical range of motion to within normal limits with less than 5/10 pain for improved ease with driving tasks.   Patient will improve bilateral upper extremity strength to 4+/5 or greater for improved functional upper extremity strength  Patient will improve bilateral lower extremity strength to 4+/5  or greater for improved functional lower extremity strength    Plan   Plan of care Certification: 8/15/2023 to 11/15/2023.     Outpatient Physical Therapy 2 times weekly for 10 weeks to include the following interventions: Aquatic Therapy, Cervical/Lumbar Traction, Electrical Stimulation , Gait Training, Manual Therapy, Moist Heat/ Ice, Neuromuscular Re-ed, Orthotic Management and Training, Patient Education, Self Care, Therapeutic Activities, Therapeutic Exercise, and Ultrasound. And any other therapies and modalities as clinically indicated and appropriate, including but not limited to FDN and telehealth. Pt may be seen by PTA as a part of pt's care team    Rafael Taylor PTA

## 2023-09-28 NOTE — TELEPHONE ENCOUNTER
Returned patient call. Patient asked if she had an appointment to take out her stitches. Informed patient she had an appt on Sept 21 that she no showed in which she would have had the stitches removed. Appt offered for tomorrow at 2:30pm. Patient accepted

## 2023-09-29 ENCOUNTER — OFFICE VISIT (OUTPATIENT)
Dept: ORTHOPEDICS | Facility: CLINIC | Age: 38
End: 2023-09-29
Payer: MEDICAID

## 2023-09-29 VITALS — BODY MASS INDEX: 34.04 KG/M2 | WEIGHT: 185 LBS | HEIGHT: 62 IN

## 2023-09-29 DIAGNOSIS — T81.49XA SURGICAL WOUND INFECTION: ICD-10-CM

## 2023-09-29 DIAGNOSIS — Z98.890 S/P CARPAL TUNNEL RELEASE: Primary | ICD-10-CM

## 2023-09-29 PROCEDURE — 99024 PR POST-OP FOLLOW-UP VISIT: ICD-10-PCS | Mod: ,,,

## 2023-09-29 PROCEDURE — 1159F MED LIST DOCD IN RCRD: CPT | Mod: CPTII,,,

## 2023-09-29 PROCEDURE — 3008F PR BODY MASS INDEX (BMI) DOCUMENTED: ICD-10-PCS | Mod: CPTII,,,

## 2023-09-29 PROCEDURE — 3008F BODY MASS INDEX DOCD: CPT | Mod: CPTII,,,

## 2023-09-29 PROCEDURE — 1159F PR MEDICATION LIST DOCUMENTED IN MEDICAL RECORD: ICD-10-PCS | Mod: CPTII,,,

## 2023-09-29 PROCEDURE — 99024 POSTOP FOLLOW-UP VISIT: CPT | Mod: ,,,

## 2023-09-29 PROCEDURE — 99999 PR PBB SHADOW E&M-EST. PATIENT-LVL III: ICD-10-PCS | Mod: PBBFAC,,,

## 2023-09-29 PROCEDURE — 99213 OFFICE O/P EST LOW 20 MIN: CPT | Mod: PBBFAC

## 2023-09-29 PROCEDURE — 99999 PR PBB SHADOW E&M-EST. PATIENT-LVL III: CPT | Mod: PBBFAC,,,

## 2023-09-29 RX ORDER — SULFAMETHOXAZOLE AND TRIMETHOPRIM 800; 160 MG/1; MG/1
1 TABLET ORAL 2 TIMES DAILY
Qty: 20 TABLET | Refills: 0 | Status: SHIPPED | OUTPATIENT
Start: 2023-09-29

## 2023-09-29 NOTE — PROGRESS NOTES
SUBJECTIVE:      Patient ID: Radha Bowens is a 38 y.o. female.    HPI: Ms. Bowens is here today for post-operative visit #2.  She is 22 days status post RELEASE, CARPAL TUNNEL (Right) by Dr. Horta on 9/7/23. She did not show for her previously scheduled appointment on 9/21/23 for suture removal and did not reschedule until today. She reports that she is still having pain to the area. Pain is 7/10.  She is taking advil for pain.  She asks for a refill of Norco today.  She has been compliant with postop instructions and keeping the extremity dry. She denies fever, chills, and sweats since the time of the surgery.     Interval hx 9/13/23: Ms. Bowens is here today for post-operative visit #1.  She is 6 days status post RELEASE, CARPAL TUNNEL (Right) by Dr. Horta on 9/7/23. She reports that she is still having pain in the palmar area. Pain is 9/10. She is taking the Norco as prescribed and requests a refill today. She took off the post op dressing at her house. She has been compliant with postop instructions and keeping the extremity dry. She denies fever, chills, and sweats since the time of the surgery.     Past Medical History:   Diagnosis Date    Anxiety     Depression     Hypertension      Past Surgical History:   Procedure Laterality Date    ABDOMINOPLASTY      CARPAL TUNNEL RELEASE Right 9/7/2023    Procedure: RELEASE, CARPAL TUNNEL;  Surgeon: Waylon Horta MD;  Location: [facility];  Service: Orthopedics;  Laterality: Right;  right carpal tunnel release     Family History   Problem Relation Age of Onset    Stroke Mother     Hypertension Mother     Kidney failure Father     Diabetes Sister     Hypertension Sister     Hypertension Sister     Hypertension Brother     Cancer Maternal Grandmother     Cancer Paternal Grandmother     Cancer Paternal Grandfather      Social History     Socioeconomic History    Marital status: Unknown   Tobacco Use    Smoking status: Never     Passive exposure: Never     "Smokeless tobacco: Never   Substance and Sexual Activity    Alcohol use: Not Currently    Drug use: Never    Sexual activity: Yes     Partners: Male     Medication List with Changes/Refills   New Medications    SULFAMETHOXAZOLE-TRIMETHOPRIM 800-160MG (BACTRIM DS) 800-160 MG TAB    Take 1 tablet by mouth 2 (two) times daily.   Current Medications    ACETAMINOPHEN (TYLENOL) 325 MG TABLET    Take 325 mg by mouth every 6 (six) hours as needed for Pain.    ALPRAZOLAM (XANAX) 2 MG TAB    Take 2 mg by mouth 3 (three) times daily.    AZITHROMYCIN (Z-CELESTINE) 250 MG TABLET        DICYCLOMINE (BENTYL) 20 MG TABLET    TAKE 1 TABLET BY MOUTH EVERY MORNING AND 1 TABLET BEFORE BEDTIME    FLUTICASONE PROPIONATE (FLONASE) 50 MCG/ACTUATION NASAL SPRAY    fluticasone propionate Take 1 spray(s) (Intranasal - bilaterally) 2 times per day 20221101 spray,suspension 2 times per day Intranasal No set duration recorded No set duration amount recorded active 50 mcg/actuation    HYDROCODONE-ACETAMINOPHEN (NORCO) 5-325 MG PER TABLET    Take 1 tablet by mouth every 8 (eight) hours as needed for Pain.    METHOCARBAMOL (ROBAXIN) 500 MG TAB    TAKE 1 TABLET BY MOUTH EVERY MORNING AND BEFORE BEDTIME    NABUMETONE (RELAFEN) 500 MG TABLET    Take 500 mg by mouth 2 (two) times daily.    OXYCODONE-ACETAMINOPHEN (PERCOCET) 5-325 MG PER TABLET    Take 1 tablet by mouth 3 (three) times daily.    OZEMPIC 0.25 MG OR 0.5 MG (2 MG/3 ML) PEN INJECTOR    INJECT 0.5 MG INTO THE SKIN WEEKLY    PHENTERMINE (ADIPEX-P) 37.5 MG TABLET    Take 37.5 mg by mouth.    TIZANIDINE (ZANAFLEX) 4 MG TABLET    Take 4 mg by mouth 2 (two) times daily.     Review of patient's allergies indicates:  No Known Allergies    OBJECTIVE:     Physical exam:    Vitals:    09/29/23 1425   Weight: 83.9 kg (185 lb)   Height: 5' 2" (1.575 m)   PainSc:   7   PainLoc: Wrist     Vital signs are stable, patient is afebrile.  Patient is well dressed and well groomed, no acute distress.  Alert and " oriented to person, place, and time.    Right UE:  Incision is clean, dry, and intact.   Erythema, edema to incision site. Warm, tender to touch. No active drainage. Small pus pocket at stitch.  She is NVI.   Sutures in place. Skin growth over sutures.  2+ pulses noted.  Cap refill <2 seconds  Motor intact to hand    ASSESSMENT         Encounter Diagnoses   Name Primary?    S/P carpal tunnel release Yes    Surgical wound infection             22 days status post RELEASE, CARPAL TUNNEL (Right)    PLAN:           Radha was seen today for pain and post-op evaluation.    Diagnoses and all orders for this visit:    S/P carpal tunnel release  -     sulfamethoxazole-trimethoprim 800-160mg (BACTRIM DS) 800-160 mg Tab; Take 1 tablet by mouth 2 (two) times daily.    Surgical wound infection  -     sulfamethoxazole-trimethoprim 800-160mg (BACTRIM DS) 800-160 mg Tab; Take 1 tablet by mouth 2 (two) times daily.        - PO instruction reviewed and provided to patient. Superficial infection to incision site, likely due to sutures not removed on time  - Discussed norco cannot be refilled at this time  - The incision was cleaned with hydrogen peroxide. Sutures removed with no difficulty. Band-aid applied.  - Patient may use brace as needed for symptomatic relief.    - Bactrim sent to pharmacy, take BID for 10d  - Patient should notify the office of any signs or symptoms of infection including fevers, erythema, purulent drainage, increasing pain.    - Follow up in 1 week for wound check -- pt states that she will call to schedule        [facility]

## 2023-10-09 ENCOUNTER — CLINICAL SUPPORT (OUTPATIENT)
Dept: REHABILITATION | Facility: HOSPITAL | Age: 38
End: 2023-10-09
Payer: MEDICAID

## 2023-10-09 DIAGNOSIS — M54.2 NECK PAIN: ICD-10-CM

## 2023-10-09 DIAGNOSIS — M25.511 ACUTE PAIN OF RIGHT SHOULDER: ICD-10-CM

## 2023-10-09 DIAGNOSIS — Z74.09 IMPAIRED FUNCTIONAL MOBILITY, BALANCE, AND ENDURANCE: ICD-10-CM

## 2023-10-09 DIAGNOSIS — R29.898 UPPER EXTREMITY WEAKNESS: ICD-10-CM

## 2023-10-09 DIAGNOSIS — G89.29 CHRONIC BILATERAL LOW BACK PAIN WITHOUT SCIATICA: Primary | ICD-10-CM

## 2023-10-09 DIAGNOSIS — M54.50 CHRONIC BILATERAL LOW BACK PAIN WITHOUT SCIATICA: Primary | ICD-10-CM

## 2023-10-09 DIAGNOSIS — R29.898 WEAKNESS OF BOTH LOWER EXTREMITIES: ICD-10-CM

## 2023-10-09 PROCEDURE — 97110 THERAPEUTIC EXERCISES: CPT | Mod: PN,CQ

## 2023-10-09 NOTE — PROGRESS NOTES
"[facility]   Physical Therapy Treatment Note      Name: Radha Bowens  Clinic Number: [id number]    Therapy Diagnosis:   Encounter Diagnoses   Name Primary?    Chronic bilateral low back pain without sciatica Yes    Neck pain     Acute pain of right shoulder     Upper extremity weakness     Weakness of both lower extremities     Impaired functional mobility, balance, and endurance      Physician: Yamil Ashley MD    Visit Date: 10/9/2023    Physician Orders: PT Eval and Treat   Medical Diagnosis from Referral:               G62.9 (ICD-10-CM) - Neuropathy   G89.4 (ICD-10-CM) - Chronic pain syndrome   M79.18,G89.29 (ICD-10-CM) - Chronic myofascial pain   Evaluation Date: 8/15/2023  Authorization Period Expiration: 6/25/2023  Plan of Care Expiration: 11/15/2023  Visit # / Visits authorized: 8/ 20 (+eval)     PN DUE: 9/15/2023  PTA Visit #: 2/5      Time In: 1:50 PM   Time Out: 2:15 PM  Total Billable Time: 25 minutes     Precautions: Standard    Subjective     Pt reports: R sided lumbar pain today.    She was compliant with home exercise program.  Response to previous treatment: good  Functional change: none reported    Pain: /10  Location:  (R) LBP     Objective      Objective Measures updated at progress report unless specified.     Treatment     Billing all therapy as therapeutic exercise per [place] Medicaid guidelines    Radha received the treatments listed below:    (exercises performed today are bolded)    therapeutic exercises to develop strength, endurance, ROM, flexibility, posture, and core stabilization for 15 minutes including:  UE:   - UBE for increased UE ROM, strength, and endurance 3 min x 3 min   - (B) upper trap stretch 2 x 30"  - (B) levator scap stretch 2 x 30"  - Pectoralis stretch 3 x 30"  - Cat/cow x10  - (B) open book x 10   - Spidermans, RTB, 1x10  - Seated thoracic extensions over small ball, x30  - Cat/cow x10  - Hip adduction squeeze 2 x 10   - Alternating " "isometrics 10 x 10"        LE:  - Recumbent bike for increased LE ROM, strength, and endurance x 6 minutes  - Standing QL stretch 10" x 5  - 3-way forward flexion 5" x 5  - F4 LTR x10 ea  - Double knee to chest with SB 2 x 10  - (B) piriformis stretch 2 x 30"  - (B) side-lying hamstring stretch 2 x 30"  - Bridges on SB 2 x 10 (caused increased pain so stopped)  - SLR x 10 ea  - DL heel raises 3 x 10  - Hip 3-way on foam, 2x10  - Deadbugs, feet up, 2x10  - Repeated Sit to stands 3 x 10  - LAQ 2 x10  - Seated Hamstring curls 2 x 10   - seated marches 2 x 10  - Gastroc stretch 3 x 30"  -Repeated sit to stands w/ yellow weighted ball, 3x10    manual therapy techniques: Joint mobilizations, Manual traction, Myofacial release, Soft tissue Mobilization, and Friction Massage were applied to the: low back and neck for 10 minutes, including:  STM/TPR to (R) QL in side-lying   Manual Cervical Traction  C2-7 cervical mobs for extension  3D axial separation in flexion (R)          Patient Education and Home Exercises       Education provided:   - continue with HEP    Written Home Exercises Provided: Patient instructed to cont prior HEP. Exercises were reviewed and Radha was able to demonstrate them prior to the end of the session.  Radha demonstrated good  understanding of the education provided. See EMR under Patient Instructions for exercises provided during therapy sessions    Assessment   Patient tolerated therapy session good today. She presents with only low back pain today.  Could not perform bridges due to intervention increasing pain. Will inquire response at next session. Plan to continue to increase as tolerated.     Radha Is progressing well towards her goals.   Pt prognosis is Good.     Pt will continue to benefit from skilled outpatient physical therapy to address the deficits listed in the problem list box on initial evaluation, provide pt/family education and to maximize pt's level of independence in the " home and community environment.     Pt's spiritual, cultural and educational needs considered and pt agreeable to plan of care and goals.     Anticipated barriers to physical therapy: pain    Goals:   Short Term Goals: 5 weeks   Patient will demonstrate independence with HEP in order to progress toward functional independence  Pt will demonstrate improved pain by reports of less than or equal to 7/10 worst pain in bilateral lower back on the verbal rating scale in order to progress toward maximal functional ability and improve QOL.  Pt will demonstrate improved pain by reports of less than or equal to 7/10 worst pain in bilateral neck on the verbal rating scale in order to progress toward maximal functional ability and improve QOL.  Pt will improve cervical extension motion to greater than or equal to 60 degrees for improved range of motion for functional activities  Pt will improve Right cervical rotation motion to greater than or equal to 55 degrees for improved range of motion for functional activities  Pt will improve cervical side bending motion to greater than or equal to 40 degrees for improved range of motion for functional activities     Long Term Goals: 8-10 weeks   Patient will demonstrate improved function as indicated by a functional score of 55% on the FOTO.  Pt will demonstrate improved pain by reports of less than or equal to 5/10 worst pain on the verbal rating scale in order to progress toward maximal functional ability and improve QOL.  Pt will demonstrate improved pain by reports of less than or equal to 5/10 worst pain on the verbal rating scale in order to progress toward maximal functional ability and improve QOL.  Patient will improve cervical range of motion to within normal limits with less than 5/10 pain for improved ease with driving tasks.   Patient will improve bilateral upper extremity strength to 4+/5 or greater for improved functional upper extremity strength  Patient will improve  bilateral lower extremity strength to 4+/5 or greater for improved functional lower extremity strength    Plan   Plan of care Certification: 8/15/2023 to 11/15/2023.     Outpatient Physical Therapy 2 times weekly for 10 weeks to include the following interventions: Aquatic Therapy, Cervical/Lumbar Traction, Electrical Stimulation , Gait Training, Manual Therapy, Moist Heat/ Ice, Neuromuscular Re-ed, Orthotic Management and Training, Patient Education, Self Care, Therapeutic Activities, Therapeutic Exercise, and Ultrasound. And any other therapies and modalities as clinically indicated and appropriate, including but not limited to FDN and telehealth. Pt may be seen by PTA as a part of pt's care team    Rafael Taylor PTA

## 2023-10-24 ENCOUNTER — CLINICAL SUPPORT (OUTPATIENT)
Dept: REHABILITATION | Facility: HOSPITAL | Age: 38
End: 2023-10-24
Payer: MEDICAID

## 2023-10-24 DIAGNOSIS — M25.511 ACUTE PAIN OF RIGHT SHOULDER: ICD-10-CM

## 2023-10-24 DIAGNOSIS — M54.50 CHRONIC BILATERAL LOW BACK PAIN WITHOUT SCIATICA: Primary | ICD-10-CM

## 2023-10-24 DIAGNOSIS — M54.2 NECK PAIN: ICD-10-CM

## 2023-10-24 DIAGNOSIS — R29.898 WEAKNESS OF BOTH LOWER EXTREMITIES: ICD-10-CM

## 2023-10-24 DIAGNOSIS — G89.29 CHRONIC BILATERAL LOW BACK PAIN WITHOUT SCIATICA: Primary | ICD-10-CM

## 2023-10-24 DIAGNOSIS — Z74.09 IMPAIRED FUNCTIONAL MOBILITY, BALANCE, AND ENDURANCE: ICD-10-CM

## 2023-10-24 DIAGNOSIS — R29.898 UPPER EXTREMITY WEAKNESS: ICD-10-CM

## 2023-10-24 PROCEDURE — 97110 THERAPEUTIC EXERCISES: CPT | Mod: PN,CQ

## 2023-10-24 NOTE — PROGRESS NOTES
"[facility]   Physical Therapy Treatment Note      Name: Radha Bowens  Clinic Number: [id number]    Therapy Diagnosis:   Encounter Diagnoses   Name Primary?    Chronic bilateral low back pain without sciatica Yes    Neck pain     Acute pain of right shoulder     Upper extremity weakness     Weakness of both lower extremities     Impaired functional mobility, balance, and endurance      Physician: Yamil Ashley MD    Visit Date: 10/24/2023    Physician Orders: PT Eval and Treat   Medical Diagnosis from Referral:               G62.9 (ICD-10-CM) - Neuropathy   G89.4 (ICD-10-CM) - Chronic pain syndrome   M79.18,G89.29 (ICD-10-CM) - Chronic myofascial pain   Evaluation Date: 8/15/2023  Authorization Period Expiration: 6/25/2023  Plan of Care Expiration: 11/15/2023  Visit # / Visits authorized: 9/ 20 (+eval)     PN DUE: 9/15/2023  PTA Visit #: 5/5      Time In: 11:00 AM   Time Out: 11:45 AM  Total Billable Time: 45 minutes     Precautions: Standard    Subjective     Pt reports: Cervical pain is worse than neck pain today.    She was compliant with home exercise program.  Response to previous treatment: good  Functional change: none reported    Pain: /10  Location:  (R) LBP     Objective      Objective Measures updated at progress report unless specified.     Treatment     Billing all therapy as therapeutic exercise per [place] Medicaid guidelines    Radha received the treatments listed below:    (exercises performed today are bolded)    therapeutic exercises to develop strength, endurance, ROM, flexibility, posture, and core stabilization for 35 minutes including:  UE:   - UBE for increased UE ROM, strength, and endurance 3 min x 3 min   - (B) upper trap stretch 2 x 30"  - (B) levator scap stretch 2 x 30"  - Pectoralis stretch 3 x 30"  - Cat/cow x10  - (B) open book x 10   - Spidermans, RTB, 1x10  - Seated thoracic extensions over small ball, x30  - Cat/cow x10  - Hip adduction squeeze 2 x " "10   - Alternating isometrics 10 x 10"  - SA free motion rows, 10#, 3x10  - SA free motion extensions, 7#, 3x10  - (B) shoulder ER, RTB, 3x10  - Seated thoracic extension, x30  - Seated thoracic rotation, x30         LE:  - Recumbent bike for increased LE ROM, strength, and endurance x 6 minutes  - Standing QL stretch 10" x 5  - 3-way forward flexion 5" x 5  - F4 LTR x10 ea  - Double knee to chest with SB 2 x 10  - (B) piriformis stretch 2 x 30"  - (B) side-lying hamstring stretch 2 x 30"  - Bridges on SB 2 x 10 (caused increased pain so stopped)  - SLR x 10 ea  - DL heel raises 3 x 10  - Hip 3-way on foam, 2x10  - Deadbugs, feet up, 2x10  - Repeated Sit to stands 3 x 10  - LAQ 2 x10  - Seated Hamstring curls 2 x 10   - seated marches 2 x 10  - Gastroc stretch 3 x 30"  -Repeated sit to stands w/ yellow weighted ball, 3x10    manual therapy techniques: Joint mobilizations, Manual traction, Myofacial release, Soft tissue Mobilization, and Friction Massage were applied to the: low back and neck for 10 minutes, including:  STM/TPR to (R) QL in side-lying   Manual Cervical Traction  C2-7 cervical mobs for extension  3D axial separation in flexion (R)          Patient Education and Home Exercises       Education provided:   - continue with HEP    Written Home Exercises Provided: Patient instructed to cont prior HEP. Exercises were reviewed and Radha was able to demonstrate them prior to the end of the session.  Radha demonstrated good  understanding of the education provided. See EMR under Patient Instructions for exercises provided during therapy sessions    Assessment   Patient tolerated therapy session good today. She presents with some neck pain.  Her back pain isn't as bad today. She was able to perform all interventions without issue today. Will inquire response at next session.     Radha Is progressing well towards her goals.   Pt prognosis is Good.     Pt will continue to benefit from skilled outpatient " physical therapy to address the deficits listed in the problem list box on initial evaluation, provide pt/family education and to maximize pt's level of independence in the home and community environment.     Pt's spiritual, cultural and educational needs considered and pt agreeable to plan of care and goals.     Anticipated barriers to physical therapy: pain    Goals:   Short Term Goals: 5 weeks   Patient will demonstrate independence with HEP in order to progress toward functional independence  Pt will demonstrate improved pain by reports of less than or equal to 7/10 worst pain in bilateral lower back on the verbal rating scale in order to progress toward maximal functional ability and improve QOL.  Pt will demonstrate improved pain by reports of less than or equal to 7/10 worst pain in bilateral neck on the verbal rating scale in order to progress toward maximal functional ability and improve QOL.  Pt will improve cervical extension motion to greater than or equal to 60 degrees for improved range of motion for functional activities  Pt will improve Right cervical rotation motion to greater than or equal to 55 degrees for improved range of motion for functional activities  Pt will improve cervical side bending motion to greater than or equal to 40 degrees for improved range of motion for functional activities     Long Term Goals: 8-10 weeks   Patient will demonstrate improved function as indicated by a functional score of 55% on the FOTO.  Pt will demonstrate improved pain by reports of less than or equal to 5/10 worst pain on the verbal rating scale in order to progress toward maximal functional ability and improve QOL.  Pt will demonstrate improved pain by reports of less than or equal to 5/10 worst pain on the verbal rating scale in order to progress toward maximal functional ability and improve QOL.  Patient will improve cervical range of motion to within normal limits with less than 5/10 pain for improved  ease with driving tasks.   Patient will improve bilateral upper extremity strength to 4+/5 or greater for improved functional upper extremity strength  Patient will improve bilateral lower extremity strength to 4+/5 or greater for improved functional lower extremity strength    Plan   Plan of care Certification: 8/15/2023 to 11/15/2023.     Outpatient Physical Therapy 2 times weekly for 10 weeks to include the following interventions: Aquatic Therapy, Cervical/Lumbar Traction, Electrical Stimulation , Gait Training, Manual Therapy, Moist Heat/ Ice, Neuromuscular Re-ed, Orthotic Management and Training, Patient Education, Self Care, Therapeutic Activities, Therapeutic Exercise, and Ultrasound. And any other therapies and modalities as clinically indicated and appropriate, including but not limited to FDN and telehealth. Pt may be seen by PTA as a part of pt's care team    Rafael Taylor PTA

## 2023-12-04 ENCOUNTER — TELEPHONE (OUTPATIENT)
Dept: ORTHOPEDICS | Facility: CLINIC | Age: 38
End: 2023-12-04
Payer: MEDICAID

## 2023-12-04 NOTE — TELEPHONE ENCOUNTER
Returned patient call. Patient reports still having pain in her hand s/p Ctr. Offered pt follow up appointment, patient declined. Patient asks if provider can send in her more abx as they were helping her hand pain. Informed patient that provider is in sx today but I will informed and ask her tmr about her hand pain. Pt vu     ----- Message from Jenny Pereyra MA sent at 12/4/2023 10:07 AM CST -----  Regarding: FW: Patient Returning Call  Contact: Radha    ----- Message -----  From: Ching Aguayo  Sent: 12/4/2023   9:29 AM CST  To: [facility] Clinical Staff  Subject: Patient Returning Call                           Type:  Patient Returning Call    Who Called: Radha   Who Left Message for Patient: Jenny Pereyra MA  Does the patient know what this is regarding?:scheduling  Would the patient rather a call back or a response via [facility]? call  Best Call Back Number: [phone]  Additional Information:  Radha returned the missed call today and would like to speak to you please.

## 2023-12-04 NOTE — TELEPHONE ENCOUNTER
Attempted to contact patient, JOÃO to call back     ----- Message from [address] sent at 12/4/2023  8:38 AM CST -----  Regarding: appointment  Name of Who is Calling: Radha           What is the request in detail: Patient is requesting a call back to schedule an appointment to see if she has some fluid in her right hand. She has some pain in some areas.            Can the clinic reply by [facility]: No           What Number to Call Back if not in [facility]: [phone]

## 2023-12-05 ENCOUNTER — PATIENT MESSAGE (OUTPATIENT)
Dept: ORTHOPEDICS | Facility: CLINIC | Age: 38
End: 2023-12-05
Payer: MEDICAID

## 2023-12-05 ENCOUNTER — TELEPHONE (OUTPATIENT)
Dept: ORTHOPEDICS | Facility: CLINIC | Age: 38
End: 2023-12-05
Payer: MEDICAID

## 2023-12-05 NOTE — TELEPHONE ENCOUNTER
Attempted to contact pt with update about Rx request. LVM stating pt needs to send a picture or come in to clinic so that the provider can see the incision and decide if abx are appropriate to prescribe. Advised patient to send a pic through [employer] or give us a call back to schedule an appt.

## 2023-12-05 NOTE — TELEPHONE ENCOUNTER
Returned patient call. Patient reports she sent a picture to our staff of her hand. All pools checked and chart checked and I did not see a picture in [employer]. Advised patient that I will send her a msg via [employer] and she can reply to that message with the picture. Patient states she will do that.     ----- Message from Jenny Pereyra MA sent at 12/5/2023 12:08 PM CST -----  Contact: Siena    ----- Message -----  From: Davis Houser  Sent: 12/5/2023  12:04 PM CST  To: [facility] Clinical Staff    .Type:  Patient Returning Call    Who Called: siena  Who Left Message for Patient: Jenny Pereyra MA  Does the patient know what this is regarding?:   Would the patient rather a call back or a response via [facility]? Call   Best Call Back Number: [phone]

## 2023-12-06 ENCOUNTER — TELEPHONE (OUTPATIENT)
Dept: ORTHOPEDICS | Facility: CLINIC | Age: 38
End: 2023-12-06
Payer: MEDICAID

## 2023-12-06 NOTE — TELEPHONE ENCOUNTER
Attempted to call pt back, LVM to call back     ----- Message from Davis Houser sent at 12/6/2023  3:46 PM CST -----  Contact: JUAN LUIS  .Type:  Sooner Apoointment Request    Caller is requesting a sooner appointment.  Caller declined first available appointment listed below.  Caller will not accept being placed on the waitlist and is requesting a message be sent to doctor.  Name of Caller:Juan Luis  Symptoms: Post op, f/u after hand surgery, hand swelling   Would the patient rather a call back or a response via [employer]? Call   Best Call Back Number: [phone]                Thanks

## 2023-12-07 ENCOUNTER — OFFICE VISIT (OUTPATIENT)
Dept: ORTHOPEDICS | Facility: CLINIC | Age: 38
End: 2023-12-07
Payer: MEDICAID

## 2023-12-07 VITALS — HEIGHT: 62 IN | WEIGHT: 184.94 LBS | BODY MASS INDEX: 34.03 KG/M2

## 2023-12-07 DIAGNOSIS — L76.82 PAIN AT SURGICAL INCISION: ICD-10-CM

## 2023-12-07 DIAGNOSIS — Z98.890 S/P CARPAL TUNNEL RELEASE: Primary | ICD-10-CM

## 2023-12-07 PROCEDURE — 99213 OFFICE O/P EST LOW 20 MIN: CPT | Mod: PBBFAC

## 2023-12-07 PROCEDURE — 99999 PR PBB SHADOW E&M-EST. PATIENT-LVL III: ICD-10-PCS | Mod: PBBFAC,,,

## 2023-12-07 PROCEDURE — 3008F BODY MASS INDEX DOCD: CPT | Mod: CPTII,,,

## 2023-12-07 PROCEDURE — 99213 OFFICE O/P EST LOW 20 MIN: CPT | Mod: S$PBB,,,

## 2023-12-07 PROCEDURE — 99999 PR PBB SHADOW E&M-EST. PATIENT-LVL III: CPT | Mod: PBBFAC,,,

## 2023-12-07 PROCEDURE — 1159F PR MEDICATION LIST DOCUMENTED IN MEDICAL RECORD: ICD-10-PCS | Mod: CPTII,,,

## 2023-12-07 PROCEDURE — 99213 PR OFFICE/OUTPT VISIT, EST, LEVL III, 20-29 MIN: ICD-10-PCS | Mod: S$PBB,,,

## 2023-12-07 PROCEDURE — 3008F PR BODY MASS INDEX (BMI) DOCUMENTED: ICD-10-PCS | Mod: CPTII,,,

## 2023-12-07 PROCEDURE — 1159F MED LIST DOCD IN RCRD: CPT | Mod: CPTII,,,

## 2023-12-07 RX ORDER — METHYLPREDNISOLONE 4 MG/1
TABLET ORAL
Qty: 21 EACH | Refills: 0 | Status: SHIPPED | OUTPATIENT
Start: 2023-12-07 | End: 2023-12-28

## 2023-12-07 NOTE — PROGRESS NOTES
SUBJECTIVE:      Patient ID: Radha Bowens is a 38 y.o. female.    HPI:  The patient is a 38-year-old female who presents today for continued pain and tenderness at the incision site. She is 13 weeks status post right carpal tunnel release by Dr. Horta on 09/07/2023. Pain is rated 10/10.  She states that she feels painful knots on either side of the incision and still has some numbness to her palm.  She is taking Advil, Tylenol, and naproxen without relief.  She also asks if she could have a prescription of antibiotics for the pain today.  Denies fever, chills, sweats.  Of note patient is 43 minutes late to appointment today.    Interval hx 9/29/23: Ms. Bowens is here today for post-operative visit #2.  She is 22 days status post RELEASE, CARPAL TUNNEL (Right) by Dr. Horta on 9/7/23. She did not show for her previously scheduled appointment on 9/21/23 for suture removal and did not reschedule until today. She reports that she is still having pain to the area. Pain is 7/10.  She is taking advil for pain.  She asks for a refill of Norco today.  She has been compliant with postop instructions and keeping the extremity dry. She denies fever, chills, and sweats since the time of the surgery.     Interval hx 9/13/23: Ms. Bowens is here today for post-operative visit #1.  She is 6 days status post RELEASE, CARPAL TUNNEL (Right) by Dr. Horta on 9/7/23. She reports that she is still having pain in the palmar area. Pain is 9/10. She is taking the Norco as prescribed and requests a refill today. She took off the post op dressing at her house. She has been compliant with postop instructions and keeping the extremity dry. She denies fever, chills, and sweats since the time of the surgery.     Past Medical History:   Diagnosis Date    Anxiety     Depression     Hypertension      Past Surgical History:   Procedure Laterality Date    ABDOMINOPLASTY      CARPAL TUNNEL RELEASE Right 9/7/2023    Procedure: RELEASE, CARPAL  TUNNEL;  Surgeon: Waylon Horta MD;  Location: [facility];  Service: Orthopedics;  Laterality: Right;  right carpal tunnel release     Family History   Problem Relation Age of Onset    Stroke Mother     Hypertension Mother     Kidney failure Father     Diabetes Sister     Hypertension Sister     Hypertension Sister     Hypertension Brother     Cancer Maternal Grandmother     Cancer Paternal Grandmother     Cancer Paternal Grandfather      Social History     Socioeconomic History    Marital status: Unknown   Tobacco Use    Smoking status: Never     Passive exposure: Never    Smokeless tobacco: Never   Substance and Sexual Activity    Alcohol use: Not Currently    Drug use: Never    Sexual activity: Yes     Partners: Male     Medication List with Changes/Refills   New Medications    METHYLPREDNISOLONE (MEDROL DOSEPACK) 4 MG TABLET    use as directed   Current Medications    ACETAMINOPHEN (TYLENOL) 325 MG TABLET    Take 325 mg by mouth every 6 (six) hours as needed for Pain.    ALPRAZOLAM (XANAX) 2 MG TAB    Take 2 mg by mouth 3 (three) times daily.    AZITHROMYCIN (Z-CELESTINE) 250 MG TABLET        DICYCLOMINE (BENTYL) 20 MG TABLET    TAKE 1 TABLET BY MOUTH EVERY MORNING AND 1 TABLET BEFORE BEDTIME    FLUTICASONE PROPIONATE (FLONASE) 50 MCG/ACTUATION NASAL SPRAY    fluticasone propionate Take 1 spray(s) (Intranasal - bilaterally) 2 times per day 20221101 spray,suspension 2 times per day Intranasal No set duration recorded No set duration amount recorded active 50 mcg/actuation    HYDROCODONE-ACETAMINOPHEN (NORCO) 5-325 MG PER TABLET    Take 1 tablet by mouth every 8 (eight) hours as needed for Pain.    METHOCARBAMOL (ROBAXIN) 500 MG TAB    TAKE 1 TABLET BY MOUTH EVERY MORNING AND BEFORE BEDTIME    NABUMETONE (RELAFEN) 500 MG TABLET    Take 500 mg by mouth 2 (two) times daily.    OXYCODONE-ACETAMINOPHEN (PERCOCET) 5-325 MG PER TABLET    Take 1 tablet by mouth 3 (three) times daily.    OZEMPIC 0.25 MG OR 0.5 MG (2 MG/3  "ML) PEN INJECTOR    INJECT 0.5 MG INTO THE SKIN WEEKLY    PHENTERMINE (ADIPEX-P) 37.5 MG TABLET    Take 37.5 mg by mouth.    SULFAMETHOXAZOLE-TRIMETHOPRIM 800-160MG (BACTRIM DS) 800-160 MG TAB    Take 1 tablet by mouth 2 (two) times daily.    TIZANIDINE (ZANAFLEX) 4 MG TABLET    Take 4 mg by mouth 2 (two) times daily.     Review of patient's allergies indicates:  No Known Allergies    OBJECTIVE:     Physical exam:    Vitals:    12/07/23 1448   Weight: 83.9 kg (184 lb 15.5 oz)   Height: 5' 2" (1.575 m)   PainSc: 10-Worst pain ever   PainLoc: Wrist     Vital signs are stable, patient is afebrile.  Patient is well dressed and well groomed, no acute distress.  Alert and oriented to person, place, and time.    Right UE:  Incision is clean, dry, and intact.  Healing well.  No erythema, no exudate, no signs of infection  Hard bumpy scar tissue palpated on either side of incision, tender to palpation  She is NVI.   2+ pulses noted.  Cap refill <2 seconds  Motor intact to hand    ASSESSMENT         Encounter Diagnoses   Name Primary?    S/P carpal tunnel release Yes    Pain at surgical incision            Thirteen weeks status post RELEASE, CARPAL TUNNEL (Right)    PLAN:           Radha was seen today for post-op evaluation.    Diagnoses and all orders for this visit:    S/P carpal tunnel release  -     methylPREDNISolone (MEDROL DOSEPACK) 4 mg tablet; use as directed  -     Ambulatory referral/consult to Physical/Occupational Therapy; Future    Pain at surgical incision  -     Ambulatory referral/consult to Physical/Occupational Therapy; Future      - discussed with patient in detail that it is normal to have hard, red, bumpy, tender tissue at the incision site for a few months after surgery.  Recommend OT as the patient is already doing PT in [place] to help.  She is open to this.  - Also discussed that antibiotics would be inappropriate at this time because she is not infected.  - OT referral sent today - [facility]  - Medrol Dosepak sent for pain and inflammation  - Patient may use brace as needed for symptomatic relief.    - Patient should notify the office of any signs or symptoms of infection including fevers, erythema, purulent drainage, increasing pain.    - Follow up p[facility]le[facility]

## 2023-12-27 ENCOUNTER — TELEPHONE (OUTPATIENT)
Dept: ORTHOPEDICS | Facility: CLINIC | Age: 38
End: 2023-12-27
Payer: MEDICAID

## 2023-12-27 NOTE — TELEPHONE ENCOUNTER
Returned the patient's phone call in regards to their message. Informed the patient that Dr. Horta only treats the hand, wrist, and elbow. Patient states that they are having problems with their carpal tunnel. I got the patient schedule to see Dr. Horta on 01/02/24 at 1:00. Patient verbalized understanding

## 2023-12-27 NOTE — TELEPHONE ENCOUNTER
----- Message from Alex Contreras sent at 12/27/2023  3:06 PM CST -----  Contact: Radha Toussaint is calling in regards to her having troubles/pain with hurt left arm. She is needing an appt to discuss care. Please call back at [phone]            Thanks  DAWN

## 2024-01-04 ENCOUNTER — CLINICAL SUPPORT (OUTPATIENT)
Dept: REHABILITATION | Facility: HOSPITAL | Age: 39
End: 2024-01-04
Attending: INTERNAL MEDICINE
Payer: MEDICAID

## 2024-01-04 DIAGNOSIS — M79.641 PAIN IN RIGHT HAND: ICD-10-CM

## 2024-01-04 DIAGNOSIS — R29.898 DECREASED GRIP STRENGTH OF RIGHT HAND: ICD-10-CM

## 2024-01-04 DIAGNOSIS — L90.5 SCAR ADHERENT: Primary | ICD-10-CM

## 2024-01-04 PROCEDURE — 97165 OT EVAL LOW COMPLEX 30 MIN: CPT | Mod: PN

## 2024-01-04 NOTE — PLAN OF CARE
[facility]  Occupational Therapy Initial Evaluation      Name: Radha Bowens  Clinic Number: [id number]    Therapy Diagnosis:   Encounter Diagnoses   Name Primary?    Scar adherent Yes    Decreased  strength of right hand     Pain in right hand      Physician: Lee Ann Woods PA-C      Physician Orders: Eval and Treat  S/p right carpal tunnel release on 9/7/23. Eval and treat, modalities as needed  Medical Diagnosis: Z98.890 (ICD-10-CM) - S/P carpal tunnel release L76.82 (ICD-10-CM) - Pain at surgical incision  Surgical Procedure and Date: Procedure(s) (LRB):  RELEASE, CARPAL TUNNEL (Right), 9/7/2023   Evaluation Date: 1/4/2024  Insurance Authorization Period Expiration: 12/6/2024   Plan of Care Certification Period: 1/4/2024 - 2/28/2024  Date of Return to MD: none noted in chart at this time   Visit # / Visits authorized: 1 / pending   FOTO: 0/ 3  *To be given at next session    Precautions:  Standard and high blood pressure (takes medication)     Time In: 2:30 pm   Time Out: 3:20 pm   Total Billable Time: 50 minutes    Subjective      Date of Onset: patient reports she has had carpal tunnel symptoms since the age of 15, but they would come and go over the years.     History of Current Condition/Mechanism of Injury: Radha reports: she was having increased night time pain, numbness, and weakness of right hand prior to  RELEASE, CARPAL TUNNEL (Right) on 9/7/2023 by Dr. Horta.  Since surgery she reports having no numbness or tingling, no night time pain, and she can open jars now.  However, she continues to have some residual pain on thumb and small finger sides of the hand, tightness of the hand with fisting, and middle and ring finger stiffness with pain going into the palm when she extends her fingers.  Radha also reports nerve conduction studies have indicated that her carpal tunnel is worse left hand, but she wanted to have surgery on her right dominant hand first.    "    Falls: none    Involved Side: right  Dominant Side: Right    Mechanism of Injury: no reported MILY  Surgical Procedure: RELEASE, CARPAL TUNNEL (Right)  Imaging: none     Prior Therapy: none previous     Pain:   Functional Pain Scale Rating 0-10:   6/10 on average  5/10 at best  6/10 at worst  Location: thenar muscles, hypothenar muscles, inside palm of hand  Description: Aching   Aggravating Factors: stretching the hand, fisting to use the hand  Easing Factors: pain medication, ice, heating pad, and topical cream-reports using daily    Occupation:  reports that she is not currently employed; her previous work includes preparation for construction projects and [employer]   Working presently: not currently working   Duties: N/A     Functional Limitations/Social History:    Previous functional status includes: Independent with all ADLs.     Current Functional Status   Home/Living environment: lives with their family and 4 sons (ages 6 to 18)     - 2 story home, threshhold steps to enter, and 36 steps to upper floor     - DME: N/A       Limitation of Functional Status as follows:   ADLs/IADLs:     - Feeding: Minimal difficulty      - Bathing: no difficulty     - Dressing/Grooming: Minimal to moderate difficulty pending task; most difficulty with hair styling       - Home Management: Minimal to moderate difficulty pending task      - Driving: Minimal difficulty, but mostly using non-dominant left hand         Patient's Goals for Therapy: "My hand feeling normal again."     Past Medical History/Physical Systems Review:   Radha Bowens  has a past medical history of Anxiety, Depression, and Hypertension.    Radha Bowens  has a past surgical history that includes Abdominoplasty and Carpal tunnel release (Right, 9/7/2023).    Radha has a current medication list which includes the following prescription(s): acetaminophen, alprazolam, azithromycin, dicyclomine, fluticasone propionate, hydrocodone-acetaminophen, " methocarbamol, nabumetone, oxycodone-acetaminophen, ozempic, phentermine, sulfamethoxazole-trimethoprim 800-160mg, and tizanidine, and the following Facility-Administered Medications: chlorhexidine and lactated ringers.    Review of patient's allergies indicates:  No Known Allergies     Objective      Observation/Appearance:   incision scar mid volar wrist     Edema. Measured in centimeters.   1/4/2024 1/4/2024    Left Right   Proximal Wrist Crease 16.2 16.2   Web space  19.6 20.4       Hand ROM. Measured in degrees.   1/4/2024 1/4/2024      Left Right   Wrist:      Extension  69 69   Flexion   47* 56   Radial deviation 20 25   Ulnar deviation  30 25   Supination   WNL  WNL    Pronation  WNL  WNL         Thumb:             Opposition WNL  WNL         Fisting:     Composite  WNL  WNL    Hook  WNL  WNL    Flat  WNL  WNL    *reports pain      Sensation  Patient denies numbness & tingling at this time. Light touch, temperature, sharp and dull are grossly intact in right forearm/wrist/hand        Special Tests:   Right   1/4/2024   Intrinsic Tightness Test MF,   mild positive          Strength (Dyanmometer) and Pinch Strength (Pinch Gauge)  Measured in pounds and psi. Average of three trials.   1/4/2024 1/4/2024    Left Right   Rung II 24*wrist  34   Vance Pinch 12 12*thumb   3pt Pinch 6 6*wrist   2pt Pinch 4 4*wrist   *reports pain     Manual Muscle Testing    Action Left  Action  Right    Forearm Supination 4-/5 Forearm Supination 4/5   Forearm Pronation 3+/5 Forearm Pronation 4/5   Wrist Extension  4-/5 Wrist Extension  4/5   Wrist Flexion 3+/5* Wrist Flexion 4/5   Wrist Radial Deviation 4-/5 Wrist Radial Deviation 4/5   Wrist Ulnar Deviation 4-/5 Wrist Ulnar Deviation 4/5   *reports pain       Treatment      Total Treatment time (time-based codes) separate from Evaluation: 5 minutes    Radha received the treatments listed below:      therapeutic exercises to develop flexibility for 5 minutes including:  -review  of issued HEP        Patient Education and Home Exercises      Education provided:   -review of issued HEP    -role of OT, goals for OT, scheduling/cancellations, insurance limitations with patient.      Written Home Exercises Provided: yes.  Exercises were reviewed and Radha was able to demonstrate them prior to the end of the session.    Radha demonstrated good  understanding of the education provided.     Pt was advised to perform these exercises free of pain, and to stop performing them if pain occurs.    See EMR under Patient Instructions for exercises provided 1/3/2024.    Assessment      Radha Bowens is a 38 y.o. female referred to outpatient occupational therapy and presents with a medical diagnosis of Z98.890 (ICD-10-CM) - S/P carpal tunnel release L76.82 (ICD-10-CM) - Pain at surgical incision.    Following medical record review it is determined that pt will benefit from occupational therapy services in order to maximize pain free and/or functional use of right hand/wrist. The following goals were discussed with the patient and patient is in agreement with them as to be addressed in the treatment plan. The patient's rehab potential is Good.     Anticipated barriers to occupational therapy: therapy attendance, compliance with HEP     Plan of care discussed with patient: Yes  Patient's spiritual, cultural and educational needs considered and patient is agreeable to the plan of care and goals as stated below:     Medical Necessity is demonstrated by the following  Occupational Profile/History  Co-morbidities and personal factors that may impact the plan of care [x] LOW: Brief chart review  [] MODERATE: Expanded chart review   [] HIGH: Extensive chart review         Examination  Performance deficits relating to physical, cognitive or psychosocial skills that result in activity limitations and/or participation restrictions  [x] LOW: addressing 1-3 Performance deficits  [] MODERATE: 3-5 Performance  deficits  [] HIGH: 5+ Performance deficits (please support below)    Moderate / High Support Documentation:    Physical:  Muscle Power/Strength  Muscle Endurance  Skin Integrity/Scar Formation   Strength  Pinch Strength  Pain    Cognitive:  No Deficits    Psychosocial:    No Deficits     Treatment Options [x] LOW: Limited options  [] MODERATE: Several options  [] HIGH: Multiple options      Decision Making/ Complexity Score: low       The following goals were discussed with the patient and patient is in agreement with them as to be addressed in the treatment plan.     Goals:   Short Term Goals: (in 4 weeks)  1) Patient will be independent with HEP in 1-2 visits  2) Decrease pain in right hand/wrist to no more than 3-4/10 at worst in ADL/IADL's  3) Increase right wrist strength all planes by 1 muscle grade in right hand for improved functioning in ADL/IADL's  4) Increase  strength by 15% of original measures for increased functional use in ADL/IADL's    5) Patient will perform light resistive pinch exercises 2 sets X 10 reps with one rest break in between for improved functioning in ADL/IADL's    Long Term Goals: (in 8 weeks)   1) Decrease pain in right hand/wrist to no more than 1-2/10 at worst in ADL/IADL's  2) Increase right wrist strength to WNL for improved functioning in ADL/IADL's  3) Increase  strength by 20-25% of original measures for improved functioning in ADL/IADL's  4) Increase pinch strength by 15-20% of original measures for improved functioning in ADL/IADL's  5) Patient will perform light resistive strengthening right wrist 3 sets X 10 reps without rest breaks for improved functioning in ADL/IADL's     Plan     Plan of Care Certification: 1/4/2024 to 2/28/2024.     Outpatient Occupational Therapy 2 times weekly for 8 weeks to include the following interventions: Paraffin, Fluidotherapy, Manual therapy/joint mobilizations, Modalities for pain management, US 3 mhz, Therapeutic  exercises/activities., Strengthening, and Scar Management.    Onesimo Richard OT    Physician's Signature: _________________________________________ Date: ________________

## 2024-01-09 ENCOUNTER — CLINICAL SUPPORT (OUTPATIENT)
Dept: REHABILITATION | Facility: HOSPITAL | Age: 39
End: 2024-01-09
Payer: MEDICAID

## 2024-01-09 DIAGNOSIS — L90.5 SCAR ADHERENT: Primary | ICD-10-CM

## 2024-01-09 DIAGNOSIS — M79.641 PAIN IN RIGHT HAND: ICD-10-CM

## 2024-01-09 PROCEDURE — 97530 THERAPEUTIC ACTIVITIES: CPT | Mod: PN

## 2024-01-09 NOTE — PROGRESS NOTES
"[facility]  Occupational Therapy Treatment Note     Date: 1/9/2024  Name: Radha Bowens  Clinic Number: [id number]    Therapy Diagnosis:   Encounter Diagnoses   Name Primary?    Scar adherent Yes    Pain in right hand      Physician: Lee Ann Woods PA-C      Physician Orders: Eval and Treat  S/p right carpal tunnel release on 9/7/23. Eval and treat, modalities as needed  Medical Diagnosis: Z98.890 (ICD-10-CM) - S/P carpal tunnel release L76.82 (ICD-10-CM) - Pain at surgical incision  Surgical Procedure and Date: Procedure(s) (LRB):  RELEASE, CARPAL TUNNEL (Right), 9/7/2023   Evaluation Date: 1/4/2024  Insurance Authorization Period Expiration: 12/7/2025   Plan of Care Certification Period: 1/4/2024 - 2/28/2024  Date of Return to MD: none noted in chart at this time   Visit # / Visits authorized: 1 / 20  FOTO: 1/ 3  Initial=60 / Goal=72       Precautions:  Standard and high blood pressure (takes medication)      Time In:  2:30 pm  Time Out: 3:30 pm   Total Billable Time: 60 minutes (4TA)     Subjective     Patient reports: her wrist is feeling better after doing HEP exercises. "It's not pulling as much."     She was compliant with home exercise program given last session. She has been performing about 5 times per day.    Response to previous treatment: first treatment following evaluation   Functional change: first treatment following evaluation     Pain: 6/10 in hypothenar muscle area   Location: right hand/wrist      Objective     Objective Measures updated at progress report unless specified.    Treatment     Radha received the treatments listed below:      therapeutic exercises to develop strength, endurance, ROM, and flexibility for 14 minutes including:    Wrist stretches with red theraweb, 3 ways    5 reps X 10 sec hold    Intrinsic stretches (passive by therapist)  5 reps X 10 sec hold    Wrist 3 ways over wedge  1# 2/10 reps        Isospheres  2 min        Tendon glides  5 " reps for HEP review                         manual therapy techniques: Joint mobilizations, Myofacial release, Manual Lymphatic Drainage, Soft tissue Mobilization, and scar massage were applied to the: right hand/wrist/forearm for 30 minutes, including:  - use of hand techniques, cupping, IASTM tools, and scar pump to increase blood flow/circulation, improve soft tissue pliability and decrease pain.       direct contact modalities after being cleared for contraindications:   3 MHz, 0.8 W/cm2, 50% pulsed, 8 min to right volar wrist, to decrease pain & edema, increase circulation and tissue extensibility     supervised modalities after being cleared for contradictions:   Paraffin w/ MHP to right hand for 8 min, pre-tx to decrease pain & increase tissue extensibility       Patient Education and Home Exercises     Education provided:   -review of issued HEP    -role of OT, goals for OT, scheduling/cancellations, insurance limitations with patient.  -Progress towards goals     Written Home Exercises Provided: yes.  Exercises were reviewed and Radha was able to demonstrate them prior to the end of the session.  Radha demonstrated good  understanding of the home exercise program provided. See electronic medical record under Patient Instructions for exercises provided during therapy sessions.       Assessment   Radha Bowens is a 38 y.o. female referred to outpatient occupational therapy and presents with a medical diagnosis of Z98.890 (ICD-10-CM) - S/P carpal tunnel release L76.82 (ICD-10-CM) - Pain at surgical incision.  Today's initial treatment session focused on addressing soft tissue and adherent scar tissue.  Scar tissue at volar wrist is moderately dense with moderate adhesions beneath; scar tissue is denser on radial side of incision.  There is moderately dense deeper tissue volar forearm musculature (more radial aspect) with patient reporting tenderness in this area.  Intrinsic tightness persists with  MF/RF, but patient is reporting increased comfort following stretches and soft tissue mobilization techniques.  Patient tolerated all therapeutic exercises without significant pain or difficulty.  There is weakness and decreased endurance of forearm musculature noted with exercises.           Radha is progressing well towards her goals and there are no updates to goals at this time. Pt prognosis is Good.     Patient will continue to benefit from skilled outpatient occupational therapy to address the deficits listed in the problem list on initial evaluation provide patient/family education and to maximize patient's level of independence in the home and community environment.     Patient's spiritual, cultural and educational needs considered and patient agreeable to plan of care and goals.    Anticipated barriers to occupational therapy: therapy attendance, compliance with HEP        Goals:  Short Term Goals: (in 4 weeks)  1) Patient will be independent with HEP in 1-2 visits -Progressing   2) Decrease pain in right hand/wrist to no more than 3-4/10 at worst in ADL/IADL's-Progressing  3) Increase right wrist strength all planes by 1 muscle grade in right hand for improved functioning in ADL/IADL's-Progressing  4) Increase  strength by 15% of original measures for increased functional use in ADL/IADL's-Progressing  5) Patient will perform light resistive pinch exercises 2 sets X 10 reps with one rest break in between for improved functioning in ADL/IADL's-Progressing     Long Term Goals: (in 8 weeks)   1) Decrease pain in right hand/wrist to no more than 1-2/10 at worst in ADL/IADL's  2) Increase right wrist strength to WNL for improved functioning in ADL/IADL's  3) Increase  strength by 20-25% of original measures for improved functioning in ADL/IADL's  4) Increase pinch strength by 15-20% of original measures for improved functioning in ADL/IADL's  5) Patient will perform light resistive strengthening right  wrist 3 sets X 10 reps without rest breaks for improved functioning in ADL/IADL's     Plan   Plan of Care Certification: 1/4/2024 to 2/28/2024.      Outpatient Occupational Therapy 2 times weekly for 8 weeks to include the following interventions: Paraffin, Fluidotherapy, Manual therapy/joint mobilizations, Modalities for pain management, US 3 mhz, Therapeutic exercises/activities., Strengthening, and Scar Management.     Updates/Grading for next session: continue modalities as needed, continue with scar management techniques, progress strengthening as tolerated, progress muscular endurance of hand/wrist    Onesimo Richard OT   1/9/2024

## 2024-01-16 ENCOUNTER — CLINICAL SUPPORT (OUTPATIENT)
Dept: REHABILITATION | Facility: HOSPITAL | Age: 39
End: 2024-01-16
Payer: MEDICAID

## 2024-01-16 DIAGNOSIS — L90.5 SCAR ADHERENT: Primary | ICD-10-CM

## 2024-01-16 DIAGNOSIS — M79.641 PAIN IN RIGHT HAND: ICD-10-CM

## 2024-01-16 PROCEDURE — 97530 THERAPEUTIC ACTIVITIES: CPT | Mod: PN

## 2024-01-16 NOTE — PATIENT INSTRUCTIONS
[facility]  OCCUPATIONAL THERAPY  HOME EXERCISE PROGRAM     Complete the following strengthening program 1x/day.                          _

## 2024-01-16 NOTE — PROGRESS NOTES
[facility]  Occupational Therapy Treatment Note     Date: 1/16/2024  Name: Radha Bowens  Clinic Number: [id number]    Therapy Diagnosis:   Encounter Diagnoses   Name Primary?    Scar adherent Yes    Pain in right hand        Physician: Lee Ann Woods PA-C      Physician Orders: Eval and Treat  S/p right carpal tunnel release on 9/7/23. Eval and treat, modalities as needed  Medical Diagnosis: Z98.890 (ICD-10-CM) - S/P carpal tunnel release L76.82 (ICD-10-CM) - Pain at surgical incision  Surgical Procedure and Date: Procedure(s) (LRB):  RELEASE, CARPAL TUNNEL (Right), 9/7/2023   Evaluation Date: 1/4/2024  Insurance Authorization Period Expiration: 12/7/2025   Plan of Care Certification Period: 1/4/2024 - 2/28/2024  Date of Return to MD: none noted in chart at this time   Visit # / Visits authorized: 2 / 20  FOTO: 1/ 3  Initial=60 / Goal=72       Precautions:  Standard and high blood pressure (takes medication)      Time In:  2:17 pm  Time Out: 3:15 pm   Total Billable Time: 58 minutes (4TA)     Subjective     Patient reports: her wrist is feeling better.  She is having less cramping on the small finger side of the hand.  She feels as if she continues to have some MF tightness.      She was compliant with home exercise program given last session. She has been performing about 5 times per day.    Response to previous treatment: wrist with some improved strength   Functional change: None noted/reported at this time.       Pain: 4/10 pre-treatment and 4/10 post-treatment  Location: right hand/wrist      Objective     Objective Measures updated at progress report unless specified.    Treatment     Radha received the treatments listed below:      therapeutic exercises to develop strength, endurance, ROM, and flexibility for 20 minutes including:    Wrist stretches with red theraweb, 3 ways    5 reps X 10 sec hold    Intrinsic stretches (passive by therapist)  5 reps X 10 sec hold                 Theraputty  Yellow/green mix   -molding 2 min    -grasping 5 reps X 5 sec hold    -thumb flexion 5 reps X 5 sec hold    -pancake and bloom 3 reps    -log roll with lateral and tripod pinches  2 logs each             manual therapy techniques: Joint mobilizations, Myofacial release, Manual Lymphatic Drainage, Soft tissue Mobilization, and scar massage were applied to the: right hand/wrist/forearm for 25 minutes, including:  - use of hand techniques, cupping, IASTM tools, and scar pump to increase blood flow/circulation, improve soft tissue pliability and decrease pain.       direct contact modalities after being cleared for contraindications:     supervised modalities after being cleared for contradictions:   Paraffin w/ MHP to right hand for 10 min, pre-tx to decrease pain & increase tissue extensibility       Patient Education and Home Exercises     Education provided:   -Issued/reviewed theraputty HEP; issued yellow/green mix theraputty    -review of issued HEP    -role of OT, goals for OT, scheduling/cancellations, insurance limitations with patient.  -Progress towards goals     Written Home Exercises Provided: yes.  Exercises were reviewed and Radha was able to demonstrate them prior to the end of the session.  Radha demonstrated good  understanding of the home exercise program provided. See electronic medical record under Patient Instructions for exercises provided during therapy sessions.       Assessment   Radha Bowens is a 38 y.o. female referred to outpatient occupational therapy and presents with a medical diagnosis of Z98.890 (ICD-10-CM) - S/P carpal tunnel release L76.82 (ICD-10-CM) - Pain at surgical incision.  Today's treatment session continues to focus on addressing soft tissue and adherent scar tissue.  Scar tissue at volar wrist is moderately dense with moderate adhesions beneath; scar tissue is denser on radial side of incision.  There is moderately dense deeper tissue volar forearm  musculature (more radial aspect) with patient reporting tenderness in this area.  Intrinsic tightness persists with MF/RF, but patient is reporting increased comfort following stretches and soft tissue mobilization techniques.  Weakness and decreased endurance of forearm musculature continues to be noted.  Patient tolerated all therapeutic exercises without significant pain or difficulty.           Radha is progressing well towards her goals and there are no updates to goals at this time. Pt prognosis is Good.     Patient will continue to benefit from skilled outpatient occupational therapy to address the deficits listed in the problem list on initial evaluation provide patient/family education and to maximize patient's level of independence in the home and community environment.     Patient's spiritual, cultural and educational needs considered and patient agreeable to plan of care and goals.    Anticipated barriers to occupational therapy: therapy attendance, compliance with HEP        Goals:  Short Term Goals: (in 4 weeks)  1) Patient will be independent with HEP in 1-2 visits -Progressing   2) Decrease pain in right hand/wrist to no more than 3-4/10 at worst in ADL/IADL's-Progressing  3) Increase right wrist strength all planes by 1 muscle grade in right hand for improved functioning in ADL/IADL's-Progressing  4) Increase  strength by 15% of original measures for increased functional use in ADL/IADL's-Progressing  5) Patient will perform light resistive pinch exercises 2 sets X 10 reps with one rest break in between for improved functioning in ADL/IADL's-Progressing     Long Term Goals: (in 8 weeks)   1) Decrease pain in right hand/wrist to no more than 1-2/10 at worst in ADL/IADL's  2) Increase right wrist strength to WNL for improved functioning in ADL/IADL's  3) Increase  strength by 20-25% of original measures for improved functioning in ADL/IADL's  4) Increase pinch strength by 15-20% of original  measures for improved functioning in ADL/IADL's  5) Patient will perform light resistive strengthening right wrist 3 sets X 10 reps without rest breaks for improved functioning in ADL/IADL's     Plan   Plan of Care Certification: 1/4/2024 to 2/28/2024.      Outpatient Occupational Therapy 2 times weekly for 8 weeks to include the following interventions: Paraffin, Fluidotherapy, Manual therapy/joint mobilizations, Modalities for pain management, US 3 mhz, Therapeutic exercises/activities., Strengthening, and Scar Management.     Updates/Grading for next session: continue modalities as needed, continue with scar management techniques, progress strengthening as tolerated, progress muscular endurance of hand/wrist    Onesimo Richard, OT   1/16/2024

## 2024-01-18 ENCOUNTER — CLINICAL SUPPORT (OUTPATIENT)
Dept: REHABILITATION | Facility: HOSPITAL | Age: 39
End: 2024-01-18
Payer: MEDICAID

## 2024-01-18 DIAGNOSIS — L90.5 SCAR ADHERENT: Primary | ICD-10-CM

## 2024-01-18 DIAGNOSIS — M79.641 PAIN IN RIGHT HAND: ICD-10-CM

## 2024-01-18 PROCEDURE — 97530 THERAPEUTIC ACTIVITIES: CPT | Mod: PN

## 2024-01-18 NOTE — PROGRESS NOTES
"[facility]  Occupational Therapy Treatment Note     Date: 1/18/2024  Name: Radha Bowens  Clinic Number: [id number]    Therapy Diagnosis:   Encounter Diagnoses   Name Primary?    Scar adherent Yes    Pain in right hand        Physician: Lee Ann Woods PA-C      Physician Orders: Eval and Treat  S/p right carpal tunnel release on 9/7/23. Eval and treat, modalities as needed  Medical Diagnosis: Z98.890 (ICD-10-CM) - S/P carpal tunnel release L76.82 (ICD-10-CM) - Pain at surgical incision  Surgical Procedure and Date: Procedure(s) (LRB):  RELEASE, CARPAL TUNNEL (Right), 9/7/2023   Evaluation Date: 1/4/2024   Insurance Authorization Period Expiration: 12/7/2025   Plan of Care Certification Period: 1/4/2024 - 2/28/2024  Date of Return to MD: none noted in chart at this time   Visit # / Visits authorized: 3 / 12   FOTO: 1/ 3  Initial=60 / Goal=72       Precautions:  Standard and high blood pressure (takes medication)      Time In: 12:45 pm  Time Out: 1:30 pm   Total Billable Time: 45 minutes (3TA)     Subjective     Patient reports: her wrist is feeling better.  She is having less cramping on the small finger side of the hand, but continues to have some MF tightness.      She was compliant with home exercise program given last session. She has been performing stretches and theraputty HEP about 5 times per day.     Response to previous treatment: wrist with some improved strength   Functional change: patient reports driving "feels more normal"       Pain: 0/10 pre-treatment and 4/10 post-treatment   Location: right hand/wrist      Objective     Objective Measures updated at progress report unless specified.    Treatment     Radha received the treatments listed below:      therapeutic exercises to develop strength, endurance, ROM, and flexibility for 20 minutes including:    Wrist stretches with red theraweb, 3 ways    5 reps X 10 sec hold    Intrinsic stretches (passive by therapist)  5 reps " X 10 sec hold    Wrist 3 ways over wedge  1#, 2/10 reps    PHG, setting #3, 29# 25 reps       T bar  yellow   -smiles/frowns 2/10 reps each   -revs CW/CCW 10 reps each                        manual therapy techniques: Joint mobilizations, Myofacial release, Manual Lymphatic Drainage, Soft tissue Mobilization, and scar massage were applied to the: right hand/wrist/forearm for 15 minutes, including:  - use of hand techniques, cupping, IASTM tools, and scar pump to increase blood flow/circulation, improve soft tissue pliability and decrease pain.       direct contact modalities after being cleared for contraindications:     supervised modalities after being cleared for contradictions:   Paraffin w/ MHP to right hand for 10 min, pre-tx to decrease pain & increase tissue extensibility       Patient Education and Home Exercises     Education provided:   -Issued/reviewed theraputty HEP; issued yellow/green mix theraputty    -review of issued HEP    -role of OT, goals for OT, scheduling/cancellations, insurance limitations with patient.  -Progress towards goals     Written Home Exercises Provided: yes.  Exercises were reviewed and Radha was able to demonstrate them prior to the end of the session.  Radha demonstrated good  understanding of the home exercise program provided. See electronic medical record under Patient Instructions for exercises provided during therapy sessions.       Assessment   Radha Bowens is a 38 y.o. female referred to outpatient occupational therapy and presents with a medical diagnosis of Z98.890 (ICD-10-CM) - S/P carpal tunnel release L76.82 (ICD-10-CM) - Pain at surgical incision.  Today's treatment session continues to focus on addressing soft tissue and adherent scar tissue.  Scar tissue at volar wrist is moderately dense with moderate adhesions beneath; scar tissue is denser on radial side of incision.  There is moderately dense deeper tissue volar forearm musculature (more radial  aspect) with patient reporting tenderness in this area.  Intrinsic tightness persists with MF/RF, but improving.  Weakness and decreased endurance of forearm musculature continues to be noted.  Patient tolerated all therapeutic exercises without significant pain or difficulty.           Radha is progressing well towards her goals and there are no updates to goals at this time. Pt prognosis is Good.     Patient will continue to benefit from skilled outpatient occupational therapy to address the deficits listed in the problem list on initial evaluation provide patient/family education and to maximize patient's level of independence in the home and community environment.     Patient's spiritual, cultural and educational needs considered and patient agreeable to plan of care and goals.    Anticipated barriers to occupational therapy: therapy attendance, compliance with HEP        Goals:  Short Term Goals: (in 4 weeks)  1) Patient will be independent with HEP in 1-2 visits -Progressing   2) Decrease pain in right hand/wrist to no more than 3-4/10 at worst in ADL/IADL's-Progressing  3) Increase right wrist strength all planes by 1 muscle grade in right hand for improved functioning in ADL/IADL's-Progressing  4) Increase  strength by 15% of original measures for increased functional use in ADL/IADL's-Progressing  5) Patient will perform light resistive pinch exercises 2 sets X 10 reps with one rest break in between for improved functioning in ADL/IADL's-Progressing     Long Term Goals: (in 8 weeks)   1) Decrease pain in right hand/wrist to no more than 1-2/10 at worst in ADL/IADL's  2) Increase right wrist strength to WNL for improved functioning in ADL/IADL's  3) Increase  strength by 20-25% of original measures for improved functioning in ADL/IADL's  4) Increase pinch strength by 15-20% of original measures for improved functioning in ADL/IADL's  5) Patient will perform light resistive strengthening right wrist  3 sets X 10 reps without rest breaks for improved functioning in ADL/IADL's     Plan   Plan of Care Certification: 1/4/2024 to 2/28/2024.      Outpatient Occupational Therapy 2 times weekly for 8 weeks to include the following interventions: Paraffin, Fluidotherapy, Manual therapy/joint mobilizations, Modalities for pain management, US 3 mhz, Therapeutic exercises/activities., Strengthening, and Scar Management.     Updates/Grading for next session: continue modalities as needed, continue with scar management techniques, progress strengthening as tolerated, progress muscular endurance of hand/wrist    Onesimo Richard, OT   1/18/2024

## 2024-02-08 ENCOUNTER — CLINICAL SUPPORT (OUTPATIENT)
Dept: REHABILITATION | Facility: HOSPITAL | Age: 39
End: 2024-02-08
Payer: MEDICAID

## 2024-02-08 DIAGNOSIS — L90.5 SCAR ADHERENT: Primary | ICD-10-CM

## 2024-02-08 DIAGNOSIS — M79.641 PAIN IN RIGHT HAND: ICD-10-CM

## 2024-02-08 PROCEDURE — 97530 THERAPEUTIC ACTIVITIES: CPT | Mod: PN

## 2024-02-08 NOTE — PROGRESS NOTES
"[facility]  Occupational Therapy Treatment Note     Date: 2/8/2024  Name: Radha Bowens  Clinic Number: [id number]    Therapy Diagnosis:   Encounter Diagnoses   Name Primary?    Scar adherent Yes    Pain in right hand        Physician: Lee Ann Woods PA-C      Physician Orders: Eval and Treat  S/p right carpal tunnel release on 9/7/23. Eval and treat, modalities as needed  Medical Diagnosis: Z98.890 (ICD-10-CM) - S/P carpal tunnel release L76.82 (ICD-10-CM) - Pain at surgical incision  Surgical Procedure and Date: Procedure(s) (LRB):  RELEASE, CARPAL TUNNEL (Right), 9/7/2023   Evaluation Date: 1/4/2024   Insurance Authorization Period Expiration: 12/7/2025   Plan of Care Certification Period: 1/4/2024 - 2/28/2024  Date of Return to MD: none noted in chart at this time   Visit # / Visits authorized: 4 / 12   FOTO: 1/ 3  Initial=60 / Goal=72       Precautions:  Standard and high blood pressure (takes medication)      Time In: 12:45 pm  Time Out: 1:35 pm   Total Billable Time: 50 minutes (3TA)     Subjective     Patient reports: She is having less cramping on the small finger side of the hand.  Her pain stays around 4/10 on average, but it's down from a 6/10.  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      She was compliant with home exercise program given last session. She has been performing stretches and theraputty HEP about 3 times per day.     Response to previous treatment: wrist with some improved strength   Functional change: patient reports driving "feels more normal"       Pain: 4/10 pre-treatment and 4/10 post-treatment   Location: right hand/wrist      Objective     Objective Measures updated at progress report unless specified.    Treatment     Radha received the treatments listed below:      therapeutic exercises to develop strength, endurance, ROM, and flexibility for 25 minutes including:    Wrist stretches " with red theraweb, 3 ways    5 reps X 10 sec hold    Intrinsic stretches (passive by therapist)  5 reps X 10 sec hold    Wrist 3 ways over wedge  2#, 3/10 reps    PHG, setting #3, 29# 2/25 reps        T bar  yellow   -smiles/frowns 2/10 reps each    -revs CW/CCW 10 reps each                -dowel digs  3 pancakes, red theraputty   -door knob tool  2 pancakes, red theraputty         manual therapy techniques: Joint mobilizations, Myofacial release, Manual Lymphatic Drainage, Soft tissue Mobilization, and scar massage were applied to the: right hand/wrist/forearm for 15 minutes, including:  - use of hand techniques, cupping, IASTM tools, and scar pump to increase blood flow/circulation, improve soft tissue pliability and decrease pain.       direct contact modalities after being cleared for contraindications:     supervised modalities after being cleared for contradictions:   Paraffin w/ MHP to right hand for 10 min, pre-tx to decrease pain & increase tissue extensibility       Patient Education and Home Exercises     Education provided:   -Issued/reviewed theraputty HEP; issued yellow/green mix theraputty    -review of issued HEP    -role of OT, goals for OT, scheduling/cancellations, insurance limitations with patient.  -Progress towards goals     Written Home Exercises Provided: yes.  Exercises were reviewed and Radha was able to demonstrate them prior to the end of the session.  Radha demonstrated good  understanding of the home exercise program provided. See electronic medical record under Patient Instructions for exercises provided during therapy sessions.       Assessment   Rdaha Bowens is a 38 y.o. female referred to outpatient occupational therapy and presents with a medical diagnosis of Z98.890 (ICD-10-CM) - S/P carpal tunnel release L76.82 (ICD-10-CM) - Pain at surgical incision.  Today's treatment session focused on progressing strengthening.  Scar tissue at volar wrist is moderately to minimally  dense with minimal adhesions beneath; overall with improved mobility.  Weakness and decreased endurance of forearm musculature continues to be noted, but this is improving each session.  Patient tolerated all therapeutic exercises without significant pain or difficulty, but did report fatigue.            Radha is progressing well towards her goals and there are no updates to goals at this time. Pt prognosis is Good.     Patient will continue to benefit from skilled outpatient occupational therapy to address the deficits listed in the problem list on initial evaluation provide patient/family education and to maximize patient's level of independence in the home and community environment.     Patient's spiritual, cultural and educational needs considered and patient agreeable to plan of care and goals.    Anticipated barriers to occupational therapy: therapy attendance, compliance with HEP        Goals:  Short Term Goals: (in 4 weeks)  1) Patient will be independent with HEP in 1-2 visits -Progressing   2) Decrease pain in right hand/wrist to no more than 3-4/10 at worst in ADL/IADL's-Progressing  3) Increase right wrist strength all planes by 1 muscle grade in right hand for improved functioning in ADL/IADL's-Progressing  4) Increase  strength by 15% of original measures for increased functional use in ADL/IADL's-Progressing  5) Patient will perform light resistive pinch exercises 2 sets X 10 reps with one rest break in between for improved functioning in ADL/IADL's-Progressing     Long Term Goals: (in 8 weeks)   1) Decrease pain in right hand/wrist to no more than 1-2/10 at worst in ADL/IADL's  2) Increase right wrist strength to WNL for improved functioning in ADL/IADL's  3) Increase  strength by 20-25% of original measures for improved functioning in ADL/IADL's  4) Increase pinch strength by 15-20% of original measures for improved functioning in ADL/IADL's  5) Patient will perform light resistive  strengthening right wrist 3 sets X 10 reps without rest breaks for improved functioning in ADL/IADL's     Plan   Plan of Care Certification: 1/4/2024 to 2/28/2024.      Outpatient Occupational Therapy 2 times weekly for 8 weeks to include the following interventions: Paraffin, Fluidotherapy, Manual therapy/joint mobilizations, Modalities for pain management, US 3 mhz, Therapeutic exercises/activities., Strengthening, and Scar Management.     Updates/Grading for next session: continue modalities as needed, continue with scar management techniques, progress strengthening as tolerated, progress muscular endurance of hand/wrist    Onesimo Richard, OT   2/8/2024

## 2024-02-14 ENCOUNTER — CLINICAL SUPPORT (OUTPATIENT)
Dept: REHABILITATION | Facility: HOSPITAL | Age: 39
End: 2024-02-14
Payer: MEDICAID

## 2024-02-14 DIAGNOSIS — L90.5 SCAR ADHERENT: Primary | ICD-10-CM

## 2024-02-14 DIAGNOSIS — M79.641 PAIN IN RIGHT HAND: ICD-10-CM

## 2024-02-14 PROCEDURE — 97530 THERAPEUTIC ACTIVITIES: CPT | Mod: PN

## 2024-02-14 NOTE — PROGRESS NOTES
"[facility]  Occupational Therapy Treatment Note     Date: 2/14/2024  Name: Radha Bowens  Clinic Number: [id number]    Therapy Diagnosis:   Encounter Diagnoses   Name Primary?    Scar adherent Yes    Pain in right hand        Physician: Lee Ann Woods PA-C      Physician Orders: Eval and Treat  S/p right carpal tunnel release on 9/7/23. Eval and treat, modalities as needed  Medical Diagnosis: Z98.890 (ICD-10-CM) - S/P carpal tunnel release L76.82 (ICD-10-CM) - Pain at surgical incision  Surgical Procedure and Date: Procedure(s) (LRB):  RELEASE, CARPAL TUNNEL (Right), 9/7/2023   Evaluation Date: 1/4/2024   Insurance Authorization Period Expiration: 12/7/2025   Plan of Care Certification Period: 1/4/2024 - 2/28/2024  Date of Return to MD: none noted in chart at this time   Visit # / Visits authorized: 5 / 12   FOTO: 1 / 3  Initial=60 / Goal=72        Precautions:  Standard and high blood pressure (takes medication)      Time In: 2:27 pm  Time Out: 3:25 pm   Total Billable Time: 58 minutes (4TA)     Subjective     Patient reports: She is having less cramping on the small finger side of the hand.      She was compliant with home exercise program given last session. She has been performing stretches and theraputty HEP about 3 times per day.     Response to previous treatment: wrist with some improved strength   Functional change: patient reports driving "feels more normal"       Pain: 3/10 pre-treatment and 3/10 post-treatment   Location: right hand/wrist      Objective     Objective Measures updated at progress report unless specified.    Treatment     Radha received the treatments listed below:      therapeutic exercises to develop strength, endurance, ROM, and flexibility for 25 minutes including:    Wrist stretches with red theraweb, 3 ways    5 reps X 10 sec hold    Intrinsic stretches (passive by therapist)  5 reps X 10 sec hold    Wrist 3 ways over wedge  2#, 3/10 reps    PHG, " setting #3, 29# 2/25 reps        T bar  yellow   -smiles/frowns 2/10 reps each    -revs CW/CCW 2/10 reps each                -log roll with lateral and tripod pinches  2 logs each    -dowel digs  3 pancakes, red theraputty    -door knob tool  2 pancakes, red theraputty         manual therapy techniques: Joint mobilizations, Myofacial release, Manual Lymphatic Drainage, Soft tissue Mobilization, and scar massage were applied to the: right hand/wrist/forearm for 23 minutes, including:  - use of hand techniques, cupping, IASTM tools, and scar pump to increase blood flow/circulation, improve soft tissue pliability and decrease pain.       direct contact modalities after being cleared for contraindications:     supervised modalities after being cleared for contradictions:   Paraffin w/ MHP to right hand for 10 min, pre-tx to decrease pain & increase tissue extensibility       Patient Education and Home Exercises     Education provided:   -Issued/reviewed theraputty HEP; issued yellow/green mix theraputty    -review of issued HEP    -role of OT, goals for OT, scheduling/cancellations, insurance limitations with patient.  -Progress towards goals     Written Home Exercises Provided: yes.  Exercises were reviewed and Radha was able to demonstrate them prior to the end of the session.  Radha demonstrated good  understanding of the home exercise program provided. See electronic medical record under Patient Instructions for exercises provided during therapy sessions.       Assessment   Radha Bowens is a 38 y.o. female referred to outpatient occupational therapy and presents with a medical diagnosis of Z98.890 (ICD-10-CM) - S/P carpal tunnel release L76.82 (ICD-10-CM) - Pain at surgical incision.  Scar tissue at volar wrist is moderately to minimally dense with minimal adhesions beneath; overall with improved mobility.  Weakness and decreased endurance of forearm musculature continues to be noted, but this is improving  each session.  Patient tolerated all therapeutic exercises without significant pain or difficulty, but did report fatigue.            Radha is progressing well towards her goals and there are no updates to goals at this time. Pt prognosis is Good.     Patient will continue to benefit from skilled outpatient occupational therapy to address the deficits listed in the problem list on initial evaluation provide patient/family education and to maximize patient's level of independence in the home and community environment.     Patient's spiritual, cultural and educational needs considered and patient agreeable to plan of care and goals.    Anticipated barriers to occupational therapy: therapy attendance, compliance with HEP        Goals:  Short Term Goals: (in 4 weeks)  1) Patient will be independent with HEP in 1-2 visits -Progressing   2) Decrease pain in right hand/wrist to no more than 3-4/10 at worst in ADL/IADL's-Progressing  3) Increase right wrist strength all planes by 1 muscle grade in right hand for improved functioning in ADL/IADL's-Progressing  4) Increase  strength by 15% of original measures for increased functional use in ADL/IADL's-Progressing  5) Patient will perform light resistive pinch exercises 2 sets X 10 reps with one rest break in between for improved functioning in ADL/IADL's-Progressing     Long Term Goals: (in 8 weeks)   1) Decrease pain in right hand/wrist to no more than 1-2/10 at worst in ADL/IADL's  2) Increase right wrist strength to WNL for improved functioning in ADL/IADL's  3) Increase  strength by 20-25% of original measures for improved functioning in ADL/IADL's  4) Increase pinch strength by 15-20% of original measures for improved functioning in ADL/IADL's  5) Patient will perform light resistive strengthening right wrist 3 sets X 10 reps without rest breaks for improved functioning in ADL/IADL's     Plan   Plan of Care Certification: 1/4/2024 to 2/28/2024.      Outpatient  Occupational Therapy 2 times weekly for 8 weeks to include the following interventions: Paraffin, Fluidotherapy, Manual therapy/joint mobilizations, Modalities for pain management, US 3 mhz, Therapeutic exercises/activities., Strengthening, and Scar Management.     Updates/Grading for next session: continue modalities as needed, continue with scar management techniques, progress strengthening as tolerated, progress muscular endurance of hand/wrist    Onesimo Richard, OT   2/14/2024

## 2024-02-20 ENCOUNTER — CLINICAL SUPPORT (OUTPATIENT)
Dept: REHABILITATION | Facility: HOSPITAL | Age: 39
End: 2024-02-20
Payer: MEDICAID

## 2024-02-20 DIAGNOSIS — L90.5 SCAR ADHERENT: Primary | ICD-10-CM

## 2024-02-20 DIAGNOSIS — M79.641 PAIN IN RIGHT HAND: ICD-10-CM

## 2024-02-20 PROCEDURE — 97530 THERAPEUTIC ACTIVITIES: CPT | Mod: PN

## 2024-02-20 NOTE — PROGRESS NOTES
"[facility]  Occupational Therapy Treatment Note     Date: 2/20/2024  Name: Radha Bowens  Clinic Number: [id number]    Therapy Diagnosis:   Encounter Diagnoses   Name Primary?    Scar adherent Yes    Pain in right hand        Physician: Lee Ann Woods PA-C      Physician Orders: Eval and Treat  S/p right carpal tunnel release on 9/7/23. Eval and treat, modalities as needed  Medical Diagnosis: Z98.890 (ICD-10-CM) - S/P carpal tunnel release L76.82 (ICD-10-CM) - Pain at surgical incision  Surgical Procedure and Date: Procedure(s) (LRB):  RELEASE, CARPAL TUNNEL (Right), 9/7/2023   Evaluation Date: 1/4/2024   Insurance Authorization Period Expiration: 12/7/2025   Plan of Care Certification Period: 1/4/2024 - 2/28/2024   Date of Return to MD: none noted in chart at this time   Visit # / Visits authorized: 6 / 12   FOTO: 1 / 3  Initial=60 / Goal=72        Precautions:  Standard and high blood pressure (takes medication)      Time In: 1:30 pm  Time Out: 2:25 pm   Total Billable Time: 55 minutes (4TA)     Subjective     Patient reports: She is having less cramping on the small finger side of the hand.      She was compliant with home exercise program given last session. She has been performing stretches and theraputty HEP about 3 times per day.     Response to previous treatment: improved endurance of the hand   Functional change: patient reports driving "feels more normal"       Pain: 2/10 pre-treatment and 2/10 post-treatment   Location: right hand/wrist      Objective     Objective Measures updated at progress report unless specified.    Treatment     Radha received the treatments listed below:      therapeutic exercises to develop strength, endurance, ROM, and flexibility for 22 minutes including:    Wrist stretches with red theraweb, 3 ways    5 reps X 10 sec hold    Intrinsic stretches (passive by therapist)  5 reps X 10 sec hold     Wrist 3 ways over wedge  3#, 3/10 reps    PHG, " setting #3, 29# 2/25 reps        T bar  yellow   -smiles/frowns 3/10 reps each    -revs CW/CCW 3/10 reps each        Isospheres  2 min            -log roll with lateral and tripod pinches  2 logs each, red theraputty     -dowel digs  3 pancakes, red theraputty    -door knob tool  2 pancakes, red theraputty         manual therapy techniques: Joint mobilizations, Myofacial release, Manual Lymphatic Drainage, Soft tissue Mobilization, and scar massage were applied to the: right hand/wrist/forearm for 23 minutes, including:  - use of hand techniques, cupping, IASTM tools, and scar pump to increase blood flow/circulation, improve soft tissue pliability and decrease pain.       direct contact modalities after being cleared for contraindications:     supervised modalities after being cleared for contradictions:   Paraffin w/ MHP to right hand for 10 min, pre-tx to decrease pain & increase tissue extensibility       Patient Education and Home Exercises     Education provided:   -Issued/reviewed theraputty HEP; issued yellow/green mix theraputty    -review of issued HEP    -role of OT, goals for OT, scheduling/cancellations, insurance limitations with patient.  -Progress towards goals     Written Home Exercises Provided: yes.  Exercises were reviewed and Radha was able to demonstrate them prior to the end of the session.  Radha demonstrated good  understanding of the home exercise program provided. See electronic medical record under Patient Instructions for exercises provided during therapy sessions.       Assessment   Radha Bowens is a 38 y.o. female referred to outpatient occupational therapy and presents with a medical diagnosis of Z98.890 (ICD-10-CM) - S/P carpal tunnel release L76.82 (ICD-10-CM) - Pain at surgical incision.  Scar tissue at volar wrist is moderately to minimally dense with minimal adhesions beneath; overall with improved mobility.  Weakness and decreased endurance of forearm musculature  continues to be noted, but this is improving each session.  Patient tolerated all therapeutic exercises without significant pain or difficulty, but did report fatigue.            Radha is progressing well towards her goals and there are no updates to goals at this time. Pt prognosis is Good.     Patient will continue to benefit from skilled outpatient occupational therapy to address the deficits listed in the problem list on initial evaluation provide patient/family education and to maximize patient's level of independence in the home and community environment.     Patient's spiritual, cultural and educational needs considered and patient agreeable to plan of care and goals.    Anticipated barriers to occupational therapy: therapy attendance, compliance with HEP        Goals:  Short Term Goals: (in 4 weeks)  1) Patient will be independent with HEP in 1-2 visits -Progressing   2) Decrease pain in right hand/wrist to no more than 3-4/10 at worst in ADL/IADL's-Progressing  3) Increase right wrist strength all planes by 1 muscle grade in right hand for improved functioning in ADL/IADL's-Progressing  4) Increase  strength by 15% of original measures for increased functional use in ADL/IADL's-Progressing  5) Patient will perform light resistive pinch exercises 2 sets X 10 reps with one rest break in between for improved functioning in ADL/IADL's-Progressing     Long Term Goals: (in 8 weeks)   1) Decrease pain in right hand/wrist to no more than 1-2/10 at worst in ADL/IADL's  2) Increase right wrist strength to WNL for improved functioning in ADL/IADL's  3) Increase  strength by 20-25% of original measures for improved functioning in ADL/IADL's  4) Increase pinch strength by 15-20% of original measures for improved functioning in ADL/IADL's  5) Patient will perform light resistive strengthening right wrist 3 sets X 10 reps without rest breaks for improved functioning in ADL/IADL's     Plan   Plan of Care  Certification: 1/4/2024 to 2/28/2024.      Outpatient Occupational Therapy 2 times weekly for 8 weeks to include the following interventions: Paraffin, Fluidotherapy, Manual therapy/joint mobilizations, Modalities for pain management, US 3 mhz, Therapeutic exercises/activities., Strengthening, and Scar Management.     Updates/Grading for next session: continue modalities as needed, continue with scar management techniques, progress strengthening as tolerated, progress muscular endurance of hand/wrist    Onesimo Richard, OT   2/20/2024

## 2024-02-28 ENCOUNTER — CLINICAL SUPPORT (OUTPATIENT)
Dept: REHABILITATION | Facility: HOSPITAL | Age: 39
End: 2024-02-28
Payer: MEDICAID

## 2024-02-28 DIAGNOSIS — M79.641 PAIN IN RIGHT HAND: ICD-10-CM

## 2024-02-28 DIAGNOSIS — L90.5 SCAR ADHERENT: Primary | ICD-10-CM

## 2024-02-28 PROCEDURE — 97530 THERAPEUTIC ACTIVITIES: CPT | Mod: PN

## 2024-02-28 NOTE — PROGRESS NOTES
[facility]  Occupational Therapy Treatment Note     Date: 2/28/2024  Name: Radha Bowens  Clinic Number: [id number]    Therapy Diagnosis:   Encounter Diagnoses   Name Primary?    Scar adherent Yes    Pain in right hand          Physician: Lee Ann Woods PA-C       Physician Orders: Eval and Treat  S/p right carpal tunnel release on 9/7/23. Eval and treat, modalities as needed  Medical Diagnosis: Z98.890 (ICD-10-CM) - S/P carpal tunnel release L76.82 (ICD-10-CM) - Pain at surgical incision  Surgical Procedure and Date: Procedure(s) (LRB):  RELEASE, CARPAL TUNNEL (Right), 9/7/2023   Evaluation Date: 1/4/2024   Insurance Authorization Period Expiration: 12/7/2025   Plan of Care Certification Period: 1/4/2024 - 2/28/2024 Re-assess and Update Plan of Care next visit   Date of Return to MD: none noted in chart at this time   Visit # / Visits authorized: 7 / 12   FOTO: 1 / 3  Initial=60 / Goal=72  /Updated= 71 (2/28/2024)       Precautions:  Standard and high blood pressure (takes medication)      Time In: 1:30 pm  Time Out: 2:30 pm   Total Billable Time: 60 minutes (4TA)      Subjective     Patient reports: She is having less cramping on the small finger side of the hand, but continues to have trigger points in thenar and hypothenar musculature.     She was compliant with home exercise program given last session. She has been performing stretches and theraputty HEP about 3 times per day.     Response to previous treatment: improved strength and endurance of the hand    Functional change: pressure washed the house this weekend        Pain: 2/10 pre-treatment and 2/10 post-treatment   Location: right hand/wrist      Objective     Objective Measures updated at progress report unless specified.    Treatment     Radha received the treatments listed below:      therapeutic exercises to develop strength, endurance, ROM, and flexibility for 27 minutes including:    Wrist stretches with red  theraweb, 3 ways    5 reps X 10 sec hold    Wrist 3 ways over wedge  3#, 3/10 reps    PHG, setting #3, 29# 2/25 reps        T bar  yellow   -smiles/frowns 3/10 reps each    -revs CW/CCW 3/10 reps each            Theraputty  red   -molding 2 min    -grasping 5 reps X 5 sec hold    -thumb flexion 5 reps X 5 sec hold    -pancake and bloom 3 reps    -log roll with lateral and tripod pinches  2 logs each, red theraputty     -dowel digs  3 pancakes    -door knob tool  2 pancakes    -bottle top tool  1 pancake          manual therapy techniques: Joint mobilizations, Myofacial release, Manual Lymphatic Drainage, Soft tissue Mobilization, and scar massage were applied to the: right hand/wrist/forearm for 23 minutes, including:  - use of hand techniques, cupping, IASTM tools, and scar pump to increase blood flow/circulation, improve soft tissue pliability and decrease pain.       direct contact modalities after being cleared for contraindications:     supervised modalities after being cleared for contradictions:   Paraffin w/ MHP to right hand for 10 min, pre-tx to decrease pain & increase tissue extensibility       Patient Education and Home Exercises     Education provided:   -Issued/reviewed theraputty HEP; issued yellow/green mix theraputty    -review of issued HEP    -role of OT, goals for OT, scheduling/cancellations, insurance limitations with patient.  -Progress towards goals     Written Home Exercises Provided: yes.  Exercises were reviewed and Radha was able to demonstrate them prior to the end of the session.  Radha demonstrated good  understanding of the home exercise program provided. See electronic medical record under Patient Instructions for exercises provided during therapy sessions.       Assessment   Radha Bowens is a 38 y.o. female referred to outpatient occupational therapy and presents with a medical diagnosis of Z98.890 (ICD-10-CM) - S/P carpal tunnel release L76.82 (ICD-10-CM) - Pain at  surgical incision.  Scar tissue at volar wrist is minimally dense with minimal adhesions beneath; overall with improved mobility.  Strength and endurance of forearm musculature improving.  Patient does report decreased endurance and fatigue following exercises.  Patient tolerated all therapeutic exercises without significant pain or difficulty.           Radha is progressing well towards her goals and there are no updates to goals at this time. Pt prognosis is Good.     Patient will continue to benefit from skilled outpatient occupational therapy to address the deficits listed in the problem list on initial evaluation provide patient/family education and to maximize patient's level of independence in the home and community environment.     Patient's spiritual, cultural and educational needs considered and patient agreeable to plan of care and goals.    Anticipated barriers to occupational therapy: therapy attendance, compliance with HEP        Goals:  Short Term Goals: (in 4 weeks)  1) Patient will be independent with HEP in 1-2 visits -Progressing   2) Decrease pain in right hand/wrist to no more than 3-4/10 at worst in ADL/IADL's-Progressing  3) Increase right wrist strength all planes by 1 muscle grade in right hand for improved functioning in ADL/IADL's-Progressing  4) Increase  strength by 15% of original measures for increased functional use in ADL/IADL's-Progressing  5) Patient will perform light resistive pinch exercises 2 sets X 10 reps with one rest break in between for improved functioning in ADL/IADL's-Progressing     Long Term Goals: (in 8 weeks)   1) Decrease pain in right hand/wrist to no more than 1-2/10 at worst in ADL/IADL's  2) Increase right wrist strength to WNL for improved functioning in ADL/IADL's  3) Increase  strength by 20-25% of original measures for improved functioning in ADL/IADL's  4) Increase pinch strength by 15-20% of original measures for improved functioning in  ADL/IADL's  5) Patient will perform light resistive strengthening right wrist 3 sets X 10 reps without rest breaks for improved functioning in ADL/IADL's     Plan   Plan of Care Certification: 1/4/2024 to 2/28/2024.      Outpatient Occupational Therapy 2 times weekly for 8 weeks to include the following interventions: Paraffin, Fluidotherapy, Manual therapy/joint mobilizations, Modalities for pain management, US 3 mhz, Therapeutic exercises/activities., Strengthening, and Scar Management.     Updates/Grading for next session: continue modalities as needed, continue with scar management techniques, progress strengthening as tolerated, progress muscular endurance of hand/wrist    Onesimo Richard, OT   2/28/2024

## 2024-03-07 ENCOUNTER — CLINICAL SUPPORT (OUTPATIENT)
Dept: REHABILITATION | Facility: HOSPITAL | Age: 39
End: 2024-03-07
Payer: MEDICAID

## 2024-03-07 DIAGNOSIS — L90.5 SCAR ADHERENT: Primary | ICD-10-CM

## 2024-03-07 DIAGNOSIS — M79.641 PAIN IN RIGHT HAND: ICD-10-CM

## 2024-03-07 PROCEDURE — 97530 THERAPEUTIC ACTIVITIES: CPT | Mod: PN

## 2024-03-07 NOTE — PROGRESS NOTES
[facility]  Occupational Therapy Treatment Note     Date: 3/7/2024  Name: Radha Bowens  Clinic Number: [id number]    Therapy Diagnosis:   Encounter Diagnoses   Name Primary?    Scar adherent Yes    Pain in right hand          Physician: Lee Ann Woods PA-C       Physician Orders: Eval and Treat  S/p right carpal tunnel release on 9/7/23. Eval and treat, modalities as needed  Medical Diagnosis: Z98.890 (ICD-10-CM) - S/P carpal tunnel release L76.82 (ICD-10-CM) - Pain at surgical incision  Surgical Procedure and Date: Procedure(s) (LRB):  RELEASE, CARPAL TUNNEL (Right), 9/7/2023   Evaluation Date: 1/4/2024   Insurance Authorization Period Expiration: 12/7/2025   Plan of Care Certification Period: 1/4/2024 - 2/28/2024   Date of Return to MD: none noted in chart at this time   Visit # / Visits authorized: 8 / 12   FOTO: 2 / 3  Initial=60 / Goal=72  /Updated= 71 (2/28/2024)       Precautions:  Standard and high blood pressure (takes medication)      Time In: 1:30 pm  Time Out: 2:15 pm   Total Billable Time: 45 minutes (3TA)      Subjective     Patient reports: She continues to have some cramping in thenar and hypothenar musculature, but less than before.     She was compliant with home exercise program given last session. She has been performing stretches and theraputty HEP about 3 times per day.     Response to previous treatment: improved strength and endurance of the hand    Functional change: pressure washed the house this weekend        Pain: 2/10 pre-treatment and 2/10 post-treatment   Location: right hand/wrist      Objective     Objective Measures updated today, 3/7/2024. Please see updated plan of care.      Treatment     Radha received the treatments listed below:      therapeutic exercises to develop strength, endurance, ROM, and flexibility for 17 minutes including:  **Objective measures taken today, 3/7/2024.    Wrist 3 ways over wedge  3#, 3/10 reps    PHG, setting #3, 29#  2/25 reps        T bar  yellow   -smiles/frowns 3/10 reps each    -revs CW/CCW 3/10 reps each            Theraputty  red   -dowel digs  3 pancakes    -door knob tool  2 pancakes    -bottle top tool  1 pancake          manual therapy techniques: Joint mobilizations, Myofacial release, Manual Lymphatic Drainage, Soft tissue Mobilization, and scar massage were applied to the: right hand/wrist/forearm for 20 minutes, including:  - use of hand techniques, cupping, IASTM tools, and scar pump to increase blood flow/circulation, improve soft tissue pliability and decrease pain.       direct contact modalities after being cleared for contraindications:     supervised modalities after being cleared for contradictions:   Paraffin w/ MHP to right hand for 10 min, pre-tx to decrease pain & increase tissue extensibility       Patient Education and Home Exercises     Education provided:   -Issued/reviewed theraputty HEP; issued yellow/green mix theraputty    -review of issued HEP    -role of OT, goals for OT, scheduling/cancellations, insurance limitations with patient.  -Progress towards goals     Written Home Exercises Provided: yes.  Exercises were reviewed and Radha was able to demonstrate them prior to the end of the session.  Radha demonstrated good  understanding of the home exercise program provided. See electronic medical record under Patient Instructions for exercises provided during therapy sessions.       Assessment   See Updated Plan of Care for details on patient's progress and status of goals.     Plan   See Updated Plan of Care for details and recommendations for treatment plan.     Onesimo Richard, OT   3/7/2024

## 2024-03-08 NOTE — PLAN OF CARE
[facility]  Occupational Therapy Plan of Care Note     Name: Radha Bowens  Clinic Number: [id number]    Therapy Diagnosis:   Encounter Diagnoses   Name Primary?    Scar adherent Yes    Pain in right hand      Physician: Lee Ann Woods PA-C    Visit Date: 3/7/2024    Physician Orders: Eval and Treat  S/p right carpal tunnel release on 9/7/23. Eval and treat, modalities as needed  Medical Diagnosis: Z98.890 (ICD-10-CM) - S/P carpal tunnel release L76.82 (ICD-10-CM) - Pain at surgical incision  Evaluation Date: 1/4/2024   Authorization Period Expiration:12/7/2025    Plan of Care Expiration: 1/4/2024 - 2/28/2024  Progress Note Due: 4/18/2024   Visit # / Visits authorized: 8 / 12   FOTO: 2 / 3  Initial=60 / Goal=72  /Updated= 71 (2/28/2024)      Precautions:  Standard and high blood pressure (takes medication)   Functional Level Prior to Evaluation:  independent     SUBJECTIVE     She continues to have some cramping in thenar and hypothenar musculature, but less than before.      OBJECTIVE     Edema. Measured in centimeters.    1/4/2024 1/4/2024 3/7/2024     Left Right Right    Proximal Wrist Crease 16.2 16.2 15.6 (-.6)     Web space  19.6 20.4 19.6 (-.8)           Hand ROM. Measured in degrees.    1/4/2024    1/4/2024    3/7/2024     Left Right Right    Wrist:         Extension  69 69 75 (+6)     Flexion   47* 56 65 (+9)      Radial deviation 20 25 43 (+18)      Ulnar deviation  30 25 38 (+13)      Supination   WNL  WNL  WNL    Pronation  WNL  WNL  WNL             Thumb:                Opposition WNL  WNL  WNL             Fisting:        Composite  WNL  WNL  WNL    Hook  WNL  WNL  WNL    Flat  WNL  WNL  WNL    *reports pain             Strength (Dyanmometer) and Pinch Strength (Pinch Gauge)  Measured in pounds and psi. Average of three trials.    1/4/2024 1/4/2024 3/7/2024     Left Right Right    Rung II 24*wrist  34 60 (+26)      Vance Pinch 12 12*thumb 17* (+5)      3pt Pinch 6 6*wrist  13* (+7)      2pt Pinch 4 4*wrist 10* (+6)     *reports pain in hypothenar area with pinch strength testing      Manual Muscle Testing     Action Left  Action  Right  Right    Forearm Supination 4-/5 Forearm Supination 4/5 5/5   Forearm Pronation 3+/5 Forearm Pronation 4/5 5/5   Wrist Extension  4-/5 Wrist Extension  4/5 5/5   Wrist Flexion 3+/5* Wrist Flexion 4/5 5/5   Wrist Radial Deviation 4-/5 Wrist Radial Deviation 4/5 5/5   Wrist Ulnar Deviation 4-/5 Wrist Ulnar Deviation 4/5 4+/5   *reports pain     ASSESSMENT     Patient has made good progress with edema, range of motion, and strength goals.  Continue current therapy to address remaining reports of pillar pain, pinch strength deficits, and overall functional endurance of hand for ADLs/IADLs.     Previous Short Term Goals Status:   Currently met 3 of 5 STGs  New Short Term Goals Status:   progress towards unmet LTGs  Long Term Goal Status: continue per initial plan of care.  Reasons for Recertification of Therapy:   Update plan of care      GOALS  Short Term Goals: (in 4 weeks)  1) Patient will be independent with HEP in 1-2 visits -Met 3/7/2025  2) Decrease pain in right hand/wrist to no more than 3-4/10 at worst in ADL/IADL's-Progressing  3) Increase right wrist strength all planes by 1 muscle grade in right hand for improved functioning in ADL/IADL's-Met 3/7/2025  4) Increase  strength by 15% of original measures for increased functional use in ADL/IADL's-Met 3/7/2025  5) Patient will perform light resistive pinch exercises 2 sets X 10 reps with one rest break in between for improved functioning in ADL/IADL's-Progressing     Long Term Goals: (in 8 weeks)   1) Decrease pain in right hand/wrist to no more than 1-2/10 at worst in ADL/IADL's -Progressing  2) Increase right wrist strength to WNL for improved functioning in ADL/IADL's-Progressing  3) Increase  strength by 20-25% of original measures for improved functioning in ADL/IADL's-Met 3/7/2025  4)  Increase pinch strength by 15-20% of original measures for improved functioning in ADL/IADL's-Progressing  5) Patient will perform light resistive strengthening right wrist 3 sets X 10 reps without rest breaks for improved functioning in ADL/IADL's -Met 3/7/2025    PLAN     Updated Certification Period: 3/7/2025 to 4/18/2024   Recommended Treatment Plan: 2 times per week for 6 weeks:  Manual Therapy, Moist Heat/ Ice, Paraffin, Therapeutic Activities, and Therapeutic Exercise      Onesimo Richard, OT

## 2024-03-25 ENCOUNTER — TELEPHONE (OUTPATIENT)
Dept: ORTHOPEDICS | Facility: CLINIC | Age: 39
End: 2024-03-25
Payer: MEDICAID

## 2024-03-25 NOTE — TELEPHONE ENCOUNTER
Spoke to the patient and was able to get her scheduled for Wednesday the patient verbalized understanding       ----- Message from Hilary Angulo sent at 3/25/2024  2:48 PM CDT -----  .Type: Appointment Request     Caller is requesting appointment    No Solution Found When Scheduling: RESCHEDULE 2/21/2024    Best Call Back Number:   [phone]    Additional Information: THANK YOU

## 2024-03-27 ENCOUNTER — OFFICE VISIT (OUTPATIENT)
Dept: ORTHOPEDICS | Facility: CLINIC | Age: 39
End: 2024-03-27
Payer: MEDICAID

## 2024-03-27 VITALS — BODY MASS INDEX: 34.03 KG/M2 | HEIGHT: 62 IN | WEIGHT: 184.94 LBS

## 2024-03-27 DIAGNOSIS — G56.03 BILATERAL CARPAL TUNNEL SYNDROME: Primary | ICD-10-CM

## 2024-03-27 DIAGNOSIS — Z01.818 PREOP TESTING: ICD-10-CM

## 2024-03-27 PROCEDURE — 1159F MED LIST DOCD IN RCRD: CPT | Mod: CPTII,,, | Performed by: ORTHOPAEDIC SURGERY

## 2024-03-27 PROCEDURE — 99213 OFFICE O/P EST LOW 20 MIN: CPT | Mod: PBBFAC | Performed by: ORTHOPAEDIC SURGERY

## 2024-03-27 PROCEDURE — 3008F BODY MASS INDEX DOCD: CPT | Mod: CPTII,,, | Performed by: ORTHOPAEDIC SURGERY

## 2024-03-27 PROCEDURE — 1160F RVW MEDS BY RX/DR IN RCRD: CPT | Mod: CPTII,,, | Performed by: ORTHOPAEDIC SURGERY

## 2024-03-27 PROCEDURE — 99999 PR PBB SHADOW E&M-EST. PATIENT-LVL III: CPT | Mod: PBBFAC,,, | Performed by: ORTHOPAEDIC SURGERY

## 2024-03-27 PROCEDURE — 99214 OFFICE O/P EST MOD 30 MIN: CPT | Mod: S$PBB,,, | Performed by: ORTHOPAEDIC SURGERY

## 2024-03-27 NOTE — PROGRESS NOTES
Subjective:     Patient ID: Radha Bowens is a 38 y.o. female.    Chief Complaint: Pain of the Left Wrist and Pain of the Right Wrist      HPI:  The patient is a 38-year-old female status post right carpal tunnel release 09/07/2023.  She is pleased with that result and wishes to schedule a left carpal tunnel release.  Nerve studies did confirm bilateral carpal tunnel syndrome.  06/20/2023.    Past Medical History:   Diagnosis Date    Anxiety     Depression     Hypertension      Past Surgical History:   Procedure Laterality Date    ABDOMINOPLASTY      CARPAL TUNNEL RELEASE Right 9/7/2023    Procedure: RELEASE, CARPAL TUNNEL;  Surgeon: Waylon Horta MD;  Location: [facility];  Service: Orthopedics;  Laterality: Right;  right carpal tunnel release     Family History   Problem Relation Age of Onset    Stroke Mother     Hypertension Mother     Kidney failure Father     Diabetes Sister     Hypertension Sister     Hypertension Sister     Hypertension Brother     Cancer Maternal Grandmother     Cancer Paternal Grandmother     Cancer Paternal Grandfather      Social History     Socioeconomic History    Marital status: Unknown   Tobacco Use    Smoking status: Never     Passive exposure: Never    Smokeless tobacco: Never   Substance and Sexual Activity    Alcohol use: Not Currently    Drug use: Never    Sexual activity: Yes     Partners: Male     Medication List with Changes/Refills   Current Medications    ACETAMINOPHEN (TYLENOL) 325 MG TABLET    Take 325 mg by mouth every 6 (six) hours as needed for Pain.    ALPRAZOLAM (XANAX) 2 MG TAB    Take 2 mg by mouth 3 (three) times daily.    AZITHROMYCIN (Z-CELESTINE) 250 MG TABLET        DICYCLOMINE (BENTYL) 20 MG TABLET    TAKE 1 TABLET BY MOUTH EVERY MORNING AND 1 TABLET BEFORE BEDTIME    FLUTICASONE PROPIONATE (FLONASE) 50 MCG/ACTUATION NASAL SPRAY    fluticasone propionate Take 1 spray(s) (Intranasal - bilaterally) 2 times per day 20221101 spray,suspension 2 times per day  Intranasal No set duration recorded No set duration amount recorded active 50 mcg/actuation    HYDROCODONE-ACETAMINOPHEN (NORCO) 5-325 MG PER TABLET    Take 1 tablet by mouth every 8 (eight) hours as needed for Pain.    METHOCARBAMOL (ROBAXIN) 500 MG TAB    TAKE 1 TABLET BY MOUTH EVERY MORNING AND BEFORE BEDTIME    NABUMETONE (RELAFEN) 500 MG TABLET    Take 500 mg by mouth 2 (two) times daily.    OXYCODONE-ACETAMINOPHEN (PERCOCET) 5-325 MG PER TABLET    Take 1 tablet by mouth 3 (three) times daily.    OZEMPIC 0.25 MG OR 0.5 MG (2 MG/3 ML) PEN INJECTOR    INJECT 0.5 MG INTO THE SKIN WEEKLY    PHENTERMINE (ADIPEX-P) 37.5 MG TABLET    Take 37.5 mg by mouth.    SULFAMETHOXAZOLE-TRIMETHOPRIM 800-160MG (BACTRIM DS) 800-160 MG TAB    Take 1 tablet by mouth 2 (two) times daily.    TIZANIDINE (ZANAFLEX) 4 MG TABLET    Take 4 mg by mouth 2 (two) times daily.     Review of patient's allergies indicates:  No Known Allergies  Review of Systems   Constitutional: Negative for malaise/fatigue.   HENT:  Negative for hearing loss.    Eyes:  Negative for double vision and visual disturbance.   Cardiovascular:  Negative for chest pain.   Respiratory:  Negative for shortness of breath.    Endocrine: Negative for cold intolerance.   Hematologic/Lymphatic: Does not bruise/bleed easily.   Skin:  Negative for poor wound healing and suspicious lesions.   Musculoskeletal:  Positive for back pain and neck pain. Negative for gout, joint pain and joint swelling.   Gastrointestinal:  Negative for nausea and vomiting.   Genitourinary:  Negative for dysuria.   Neurological:  Positive for focal weakness, numbness, paresthesias and sensory change.   Psychiatric/Behavioral:  Negative for depression, memory loss and substance abuse. The patient is nervous/anxious.    Allergic/Immunologic: Negative for persistent infections.       Objective:   Body mass index is 33.83 kg/m².  There were no vitals filed for this visit.              General    Constitutional: She is oriented to person, place, and time. She appears well-developed and well-nourished. No distress.   HENT:   Head: Normocephalic.   Mouth/Throat: Oropharynx is clear and moist.   Eyes: EOM are normal.   Cardiovascular:  Normal rate.            Pulmonary/Chest: Effort normal.   Abdominal: Soft.   Neurological: She is alert and oriented to person, place, and time. No cranial nerve deficit.   Psychiatric: She has a normal mood and affect.             Right Hand/Wrist Exam     Inspection   Scars: Wrist - present Hand -  present  Effusion: Wrist - absent Hand -  absent    Other     Neuorologic Exam    Median Distribution: normal  Ulnar Distribution: normal  Radial Distribution: normal    Comments:  The patient has a well-healed carpal tunnel scar on the right      Left Hand/Wrist Exam     Inspection   Scars: Wrist - absent Hand -  absent  Effusion: Wrist - absent Hand -  absent    Pain   Wrist - The patient exhibits pain of the flexor/pronator group.    Tests   Phalens sign: positive  Tinel's sign (median nerve): positive  Carpal Tunnel Compression Test: positive    Atrophy  Thenar:  Negative  Intrinsic: negative    Other     Sensory Exam  Median Distribution: abnormal  Ulnar Distribution: normal  Radial Distribution: normal    Comments:  The patient has a positive Tinel and positive Phalen sign.  There is no thenar atrophy noted.          Vascular Exam       Capillary Refill  Right Hand: normal capillary refill  Left Hand: normal capillary refill           radiographs were not obtained today  Assessment:     Encounter Diagnosis   Name Primary?    Bilateral carpal tunnel syndrome Yes        Plan:       The patient wishes to schedule a left carpal tunnel release.  She was counseled regarding the surgery.  Risk complications and alternatives were discussed including the risk of infection, anesthetic risk, injury to nerves and vessels, loss of motion, and possible need for  additional surgeries were discussed.  She seems to understand and agree to that surgery.  All questions were answered.              Disclaimer: This note was prepared using a voice recognition system and is likely to have sound alike errors within the text.

## 2024-03-27 NOTE — H&P (VIEW-ONLY)
Subjective:     Patient ID: Radha Bowens is a 38 y.o. female.    Chief Complaint: Pain of the Left Wrist and Pain of the Right Wrist      HPI:  The patient is a 38-year-old female status post right carpal tunnel release 09/07/2023.  She is pleased with that result and wishes to schedule a left carpal tunnel release.  Nerve studies did confirm bilateral carpal tunnel syndrome.  06/20/2023.    Past Medical History:   Diagnosis Date    Anxiety     Depression     Hypertension      Past Surgical History:   Procedure Laterality Date    ABDOMINOPLASTY      CARPAL TUNNEL RELEASE Right 9/7/2023    Procedure: RELEASE, CARPAL TUNNEL;  Surgeon: Waylon Horta MD;  [facility];  Service: Orthopedics;  Laterality: Right;  right carpal tunnel release     Family History   Problem Relation Age of Onset    Stroke Mother     Hypertension Mother     Kidney failure Father     Diabetes Sister     Hypertension Sister     Hypertension Sister     Hypertension Brother     Cancer Maternal Grandmother     Cancer Paternal Grandmother     Cancer Paternal Grandfather      Social History     Socioeconomic History    Marital status: Unknown   Tobacco Use    Smoking status: Never     Passive exposure: Never    Smokeless tobacco: Never   Substance and Sexual Activity    Alcohol use: Not Currently    Drug use: Never    Sexual activity: Yes     Partners: Male     Medication List with Changes/Refills   Current Medications    ACETAMINOPHEN (TYLENOL) 325 MG TABLET    Take 325 mg by mouth every 6 (six) hours as needed for Pain.    ALPRAZOLAM (XANAX) 2 MG TAB    Take 2 mg by mouth 3 (three) times daily.    AZITHROMYCIN (Z-CELESTINE) 250 MG TABLET        DICYCLOMINE (BENTYL) 20 MG TABLET    TAKE 1 TABLET BY MOUTH EVERY MORNING AND 1 TABLET BEFORE BEDTIME    FLUTICASONE PROPIONATE (FLONASE) 50 MCG/ACTUATION NASAL SPRAY    fluticasone propionate Take 1 spray(s) (Intranasal - bilaterally) 2 times per day 20221101 spray,suspension 2 times per day  Intranasal No set duration recorded No set duration amount recorded active 50 mcg/actuation    HYDROCODONE-ACETAMINOPHEN (NORCO) 5-325 MG PER TABLET    Take 1 tablet by mouth every 8 (eight) hours as needed for Pain.    METHOCARBAMOL (ROBAXIN) 500 MG TAB    TAKE 1 TABLET BY MOUTH EVERY MORNING AND BEFORE BEDTIME    NABUMETONE (RELAFEN) 500 MG TABLET    Take 500 mg by mouth 2 (two) times daily.    OXYCODONE-ACETAMINOPHEN (PERCOCET) 5-325 MG PER TABLET    Take 1 tablet by mouth 3 (three) times daily.    OZEMPIC 0.25 MG OR 0.5 MG (2 MG/3 ML) PEN INJECTOR    INJECT 0.5 MG INTO THE SKIN WEEKLY    PHENTERMINE (ADIPEX-P) 37.5 MG TABLET    Take 37.5 mg by mouth.    SULFAMETHOXAZOLE-TRIMETHOPRIM 800-160MG (BACTRIM DS) 800-160 MG TAB    Take 1 tablet by mouth 2 (two) times daily.    TIZANIDINE (ZANAFLEX) 4 MG TABLET    Take 4 mg by mouth 2 (two) times daily.     Review of patient's allergies indicates:  No Known Allergies  Review of Systems   Constitutional: Negative for malaise/fatigue.   HENT:  Negative for hearing loss.    Eyes:  Negative for double vision and visual disturbance.   Cardiovascular:  Negative for chest pain.   Respiratory:  Negative for shortness of breath.    Endocrine: Negative for cold intolerance.   Hematologic/Lymphatic: Does not bruise/bleed easily.   Skin:  Negative for poor wound healing and suspicious lesions.   Musculoskeletal:  Positive for back pain and neck pain. Negative for gout, joint pain and joint swelling.   Gastrointestinal:  Negative for nausea and vomiting.   Genitourinary:  Negative for dysuria.   Neurological:  Positive for focal weakness, numbness, paresthesias and sensory change.   Psychiatric/Behavioral:  Negative for depression, memory loss and substance abuse. The patient is nervous/anxious.    Allergic/Immunologic: Negative for persistent infections.       Objective:   Body mass index is 33.83 kg/m².  There were no vitals filed for this visit.              General    Constitutional: She is oriented to person, place, and time. She appears well-developed and well-nourished. No distress.   HENT:   Head: Normocephalic.   Mouth/Throat: Oropharynx is clear and moist.   Eyes: EOM are normal.   Cardiovascular:  Normal rate.            Pulmonary/Chest: Effort normal.   Abdominal: Soft.   Neurological: She is alert and oriented to person, place, and time. No cranial nerve deficit.   Psychiatric: She has a normal mood and affect.             Right Hand/Wrist Exam     Inspection   Scars: Wrist - present Hand -  present  Effusion: Wrist - absent Hand -  absent    Other     Neuorologic Exam    Median Distribution: normal  Ulnar Distribution: normal  Radial Distribution: normal    Comments:  The patient has a well-healed carpal tunnel scar on the right      Left Hand/Wrist Exam     Inspection   Scars: Wrist - absent Hand -  absent  Effusion: Wrist - absent Hand -  absent    Pain   Wrist - The patient exhibits pain of the flexor/pronator group.    Tests   Phalens sign: positive  Tinel's sign (median nerve): positive  Carpal Tunnel Compression Test: positive    Atrophy  Thenar:  Negative  Intrinsic: negative    Other     Sensory Exam  Median Distribution: abnormal  Ulnar Distribution: normal  Radial Distribution: normal    Comments:  The patient has a positive Tinel and positive Phalen sign.  There is no thenar atrophy noted.          Vascular Exam       Capillary Refill  Right Hand: normal capillary refill  Left Hand: normal capillary refill           radiographs were not obtained today  Assessment:     Encounter Diagnosis   Name Primary?    Bilateral carpal tunnel syndrome Yes        Plan:       The patient wishes to schedule a left carpal tunnel release.  She was counseled regarding the surgery.  Risk complications and alternatives were discussed including the risk of infection, anesthetic risk, injury to nerves and vessels, loss of motion, and possible need for  additional surgeries were discussed.  She seems to understand and agree to that surgery.  All questions were answered.              Disclaimer: This note was prepared using a voice recognition system and is likely to have sound alike errors within the text.

## 2024-03-28 ENCOUNTER — PATIENT MESSAGE (OUTPATIENT)
Dept: PREADMISSION TESTING | Facility: HOSPITAL | Age: 39
End: 2024-03-28
Payer: MEDICAID

## 2024-04-02 RX ORDER — LABETALOL 200 MG/1
200 TABLET, FILM COATED ORAL
COMMUNITY
Start: 2024-03-23

## 2024-04-04 ENCOUNTER — HOSPITAL ENCOUNTER (OUTPATIENT)
Dept: RADIOLOGY | Facility: HOSPITAL | Age: 39
Discharge: HOME | End: 2024-04-04
Attending: ORTHOPAEDIC SURGERY
Payer: MEDICAID

## 2024-04-04 DIAGNOSIS — Z01.818 PREOP TESTING: ICD-10-CM

## 2024-04-04 PROCEDURE — 71046 X-RAY EXAM CHEST 2 VIEWS: CPT | Mod: 26,,, | Performed by: RADIOLOGY

## 2024-04-04 PROCEDURE — 71046 X-RAY EXAM CHEST 2 VIEWS: CPT | Mod: TC,PN

## 2024-04-05 ENCOUNTER — ANESTHESIA EVENT (OUTPATIENT)
Dept: SURGERY | Facility: HOSPITAL | Age: 39
End: 2024-04-05
Payer: MEDICAID

## 2024-04-05 ENCOUNTER — TELEPHONE (OUTPATIENT)
Dept: PREADMISSION TESTING | Facility: HOSPITAL | Age: 39
End: 2024-04-05
Payer: MEDICAID

## 2024-04-05 NOTE — ANESTHESIA PREPROCEDURE EVALUATION
04/05/2024  Radha Bowens is a 38 y.o., female.    Past Medical History:   Diagnosis Date    Anxiety     Depression     Hypertension      Past Surgical History:   Procedure Laterality Date    ABDOMINOPLASTY      CARPAL TUNNEL RELEASE Right 9/7/2023    Procedure: RELEASE, CARPAL TUNNEL;  Surgeon: Waylon Horta MD;  Location: [facility];  Service: Orthopedics;  Laterality: Right;  right carpal tunnel release       Pre-op Assessment    I have reviewed the Patient Summary Reports.    I have reviewed the NPO Status.   I have reviewed the Medications.     Review of Systems  Anesthesia Hx:  No problems with previous Anesthesia   History of prior surgery of interest to airway management or planning:  Previous anesthesia: General        Denies Family Hx of Anesthesia complications.    Denies Personal Hx of Anesthesia complications.                    Social:  Non-Smoker       Hematology/Oncology:  Hematology Normal                                     Cardiovascular:     Hypertension                                        Pulmonary:  Pulmonary Normal                       Renal/:  Renal/ Normal                 Hepatic/GI:  Hepatic/GI Normal                 Neurological:  Neurology Normal                                      Endocrine:        Obesity / BMI > 30  Psych:  Psychiatric History anxiety depression                Physical Exam  General: Well nourished    Airway:  Mallampati: III   Mouth Opening: Normal  TM Distance: Normal  Tongue: Normal  Neck ROM: Normal ROM    Dental:  Intact    Chest/Lungs:  Normal Respiratory Rate        Anesthesia Plan  Type of Anesthesia, risks & benefits discussed:    Anesthesia Type: MAC, Gen Natural Airway  Intra-op Monitoring Plan: Standard ASA Monitors  Post Op Pain Control Plan: multimodal analgesia and IV/PO Opioids PRN  Induction:  IV  Informed Consent: Informed  consent signed with the Patient and all parties understand the risks and agree with anesthesia plan.  All questions answered. Patient consented to blood products? No  ASA Score: 2  Day of Surgery Review of History & Physical: H&P Update referred to the surgeon/provider.    Ready For Surgery From Anesthesia Perspective.     .

## 2024-04-08 ENCOUNTER — HOSPITAL ENCOUNTER (OUTPATIENT)
Facility: HOSPITAL | Age: 39
Discharge: HOME | End: 2024-04-08
Attending: ORTHOPAEDIC SURGERY | Admitting: ORTHOPAEDIC SURGERY
Payer: MEDICAID

## 2024-04-08 ENCOUNTER — ANESTHESIA (OUTPATIENT)
Dept: SURGERY | Facility: HOSPITAL | Age: 39
End: 2024-04-08
Payer: MEDICAID

## 2024-04-08 VITALS
BODY MASS INDEX: 34.87 KG/M2 | RESPIRATION RATE: 18 BRPM | HEART RATE: 64 BPM | SYSTOLIC BLOOD PRESSURE: 137 MMHG | TEMPERATURE: 97 F | HEIGHT: 62 IN | OXYGEN SATURATION: 97 % | DIASTOLIC BLOOD PRESSURE: 84 MMHG | WEIGHT: 189.5 LBS

## 2024-04-08 DIAGNOSIS — G56.02 CARPAL TUNNEL SYNDROME OF LEFT WRIST: Primary | ICD-10-CM

## 2024-04-08 DIAGNOSIS — G56.02 LEFT CARPAL TUNNEL SYNDROME: ICD-10-CM

## 2024-04-08 LAB
B-HCG UR QL: NEGATIVE
CTP QC/QA: YES

## 2024-04-08 PROCEDURE — 63600175 PHARM REV CODE 636 W HCPCS: Performed by: NURSE ANESTHETIST, CERTIFIED REGISTERED

## 2024-04-08 PROCEDURE — 36000706: Performed by: ORTHOPAEDIC SURGERY

## 2024-04-08 PROCEDURE — D9220A PRA ANESTHESIA: Mod: ,,, | Performed by: NURSE ANESTHETIST, CERTIFIED REGISTERED

## 2024-04-08 PROCEDURE — 63600175 PHARM REV CODE 636 W HCPCS: Performed by: ANESTHESIOLOGY

## 2024-04-08 PROCEDURE — 36000707: Performed by: ORTHOPAEDIC SURGERY

## 2024-04-08 PROCEDURE — 25000003 PHARM REV CODE 250: Performed by: ORTHOPAEDIC SURGERY

## 2024-04-08 PROCEDURE — 64721 CARPAL TUNNEL SURGERY: CPT | Mod: LT,,, | Performed by: ORTHOPAEDIC SURGERY

## 2024-04-08 PROCEDURE — 63600175 PHARM REV CODE 636 W HCPCS

## 2024-04-08 PROCEDURE — 37000008 HC ANESTHESIA 1ST 15 MINUTES: Performed by: ORTHOPAEDIC SURGERY

## 2024-04-08 PROCEDURE — 71000033 HC RECOVERY, INTIAL HOUR: Performed by: ORTHOPAEDIC SURGERY

## 2024-04-08 PROCEDURE — 37000009 HC ANESTHESIA EA ADD 15 MINS: Performed by: ORTHOPAEDIC SURGERY

## 2024-04-08 PROCEDURE — 25000003 PHARM REV CODE 250: Performed by: NURSE ANESTHETIST, CERTIFIED REGISTERED

## 2024-04-08 PROCEDURE — 81025 URINE PREGNANCY TEST: CPT | Performed by: ORTHOPAEDIC SURGERY

## 2024-04-08 PROCEDURE — 25000003 PHARM REV CODE 250

## 2024-04-08 PROCEDURE — 71000015 HC POSTOP RECOV 1ST HR: Performed by: ORTHOPAEDIC SURGERY

## 2024-04-08 RX ORDER — PROPOFOL 10 MG/ML
INJECTION, EMULSION INTRAVENOUS
Status: DISCONTINUED | OUTPATIENT
Start: 2024-04-08 | End: 2024-04-08

## 2024-04-08 RX ORDER — MIDAZOLAM HYDROCHLORIDE 1 MG/ML
INJECTION INTRAMUSCULAR; INTRAVENOUS
Status: DISCONTINUED | OUTPATIENT
Start: 2024-04-08 | End: 2024-04-08

## 2024-04-08 RX ORDER — CHLORHEXIDINE GLUCONATE ORAL RINSE 1.2 MG/ML
10 SOLUTION DENTAL 2 TIMES DAILY
Status: DISCONTINUED | OUTPATIENT
Start: 2024-04-08 | End: 2024-04-08 | Stop reason: HOSPADM

## 2024-04-08 RX ORDER — CHLORHEXIDINE GLUCONATE ORAL RINSE 1.2 MG/ML
10 SOLUTION DENTAL
Status: DISCONTINUED | OUTPATIENT
Start: 2024-04-08 | End: 2024-04-08 | Stop reason: HOSPADM

## 2024-04-08 RX ORDER — ONDANSETRON HYDROCHLORIDE 2 MG/ML
4 INJECTION, SOLUTION INTRAVENOUS DAILY PRN
Status: DISCONTINUED | OUTPATIENT
Start: 2024-04-08 | End: 2024-04-08 | Stop reason: HOSPADM

## 2024-04-08 RX ORDER — LIDOCAINE HYDROCHLORIDE 20 MG/ML
INJECTION INTRAVENOUS
Status: DISCONTINUED | OUTPATIENT
Start: 2024-04-08 | End: 2024-04-08

## 2024-04-08 RX ORDER — FENTANYL CITRATE 50 UG/ML
INJECTION, SOLUTION INTRAMUSCULAR; INTRAVENOUS
Status: DISCONTINUED | OUTPATIENT
Start: 2024-04-08 | End: 2024-04-08

## 2024-04-08 RX ORDER — OXYCODONE AND ACETAMINOPHEN 5; 325 MG/1; MG/1
1 TABLET ORAL
Status: DISCONTINUED | OUTPATIENT
Start: 2024-04-08 | End: 2024-04-08 | Stop reason: HOSPADM

## 2024-04-08 RX ORDER — MEPERIDINE HYDROCHLORIDE 25 MG/ML
12.5 INJECTION INTRAMUSCULAR; INTRAVENOUS; SUBCUTANEOUS ONCE AS NEEDED
Status: DISCONTINUED | OUTPATIENT
Start: 2024-04-08 | End: 2024-04-08 | Stop reason: HOSPADM

## 2024-04-08 RX ORDER — DEXMEDETOMIDINE HYDROCHLORIDE 100 UG/ML
INJECTION, SOLUTION INTRAVENOUS
Status: DISCONTINUED | OUTPATIENT
Start: 2024-04-08 | End: 2024-04-08

## 2024-04-08 RX ORDER — ONDANSETRON HYDROCHLORIDE 2 MG/ML
INJECTION, SOLUTION INTRAVENOUS
Status: DISCONTINUED | OUTPATIENT
Start: 2024-04-08 | End: 2024-04-08

## 2024-04-08 RX ORDER — FENTANYL CITRATE 50 UG/ML
25 INJECTION, SOLUTION INTRAMUSCULAR; INTRAVENOUS EVERY 5 MIN PRN
Status: DISCONTINUED | OUTPATIENT
Start: 2024-04-08 | End: 2024-04-08 | Stop reason: HOSPADM

## 2024-04-08 RX ORDER — HYDROCODONE BITARTRATE AND ACETAMINOPHEN 5; 325 MG/1; MG/1
1 TABLET ORAL EVERY 4 HOURS PRN
Status: DISCONTINUED | OUTPATIENT
Start: 2024-04-08 | End: 2024-04-08 | Stop reason: HOSPADM

## 2024-04-08 RX ORDER — HYDROCODONE BITARTRATE AND ACETAMINOPHEN 5; 325 MG/1; MG/1
1 TABLET ORAL EVERY 6 HOURS PRN
Qty: 28 TABLET | Refills: 0 | Status: SHIPPED | OUTPATIENT
Start: 2024-04-08 | End: 2024-04-11 | Stop reason: ALTCHOICE

## 2024-04-08 RX ORDER — SODIUM CHLORIDE 0.9 % (FLUSH) 0.9 %
10 SYRINGE (ML) INJECTION
Status: DISCONTINUED | OUTPATIENT
Start: 2024-04-08 | End: 2024-04-08 | Stop reason: HOSPADM

## 2024-04-08 RX ORDER — LIDOCAINE HYDROCHLORIDE 20 MG/ML
INJECTION, SOLUTION EPIDURAL; INFILTRATION; INTRACAUDAL; PERINEURAL
Status: DISCONTINUED | OUTPATIENT
Start: 2024-04-08 | End: 2024-04-08 | Stop reason: HOSPADM

## 2024-04-08 RX ADMIN — SODIUM CHLORIDE, SODIUM LACTATE, POTASSIUM CHLORIDE, AND CALCIUM CHLORIDE: 600; 310; 30; 20 INJECTION, SOLUTION INTRAVENOUS at 09:04

## 2024-04-08 RX ADMIN — DEXMEDETOMIDINE HYDROCHLORIDE 4 MCG: 100 INJECTION, SOLUTION INTRAVENOUS at 09:04

## 2024-04-08 RX ADMIN — FENTANYL CITRATE 25 MCG: 50 INJECTION, SOLUTION INTRAMUSCULAR; INTRAVENOUS at 09:04

## 2024-04-08 RX ADMIN — CEFAZOLIN 2 G: 2 INJECTION, POWDER, FOR SOLUTION INTRAMUSCULAR; INTRAVENOUS at 09:04

## 2024-04-08 RX ADMIN — PROPOFOL 25 MG: 10 INJECTION, EMULSION INTRAVENOUS at 09:04

## 2024-04-08 RX ADMIN — LIDOCAINE HYDROCHLORIDE 50 MG: 20 INJECTION INTRAVENOUS at 09:04

## 2024-04-08 RX ADMIN — FENTANYL CITRATE 50 MCG: 50 INJECTION, SOLUTION INTRAMUSCULAR; INTRAVENOUS at 09:04

## 2024-04-08 RX ADMIN — MIDAZOLAM HYDROCHLORIDE 2 MG: 1 INJECTION INTRAMUSCULAR; INTRAVENOUS at 09:04

## 2024-04-08 RX ADMIN — ONDANSETRON 4 MG: 2 INJECTION INTRAMUSCULAR; INTRAVENOUS at 09:04

## 2024-04-08 NOTE — OP NOTE
[facility]  Orthopedic Surgery  Operative Note    SUMMARY     Date of Procedure: 4/8/2024   Assistant: None    Procedure: Procedure(s) (LRB):  RELEASE, CARPAL TUNNEL (Left)       Surgeon(s) and Role:     * Waylon Horta MD - Primary    Assisting Surgeon: None    Pre-Operative Diagnosis:  Left carpal tunnel syndrome    Post-Operative Diagnosis:  Left carpal tunnel syndrome    Anesthesia:  Local with sedation    Technical Procedures Used:  left carpal tunnel release    Description of the Findings of the Procedure:  The patient was taken to the operating room where 10 cc of 2% plain lidocaine was used for local anesthetic and was administered.  After exsanguination of the extremity a proximal tourniquet was inflated to 250 mm of mercury.  Satisfactory anesthesia had been achieved the left hand was prepped and draped in the usual sterile fashion.  The patient did receive 2 g of Ancef intravenously preoperatively.  At this time a longitudinal incision was made in line with the 4th ray over the transverse carpal ligament.  The incision was extended using blunt and sharp dissection under 3.5 loupe magnification.  The transverse carpal ligament was identified and sharply incised under direct vision.  A complete release was performed and verified by the surgeon's small finger both proximally and distally.  No additional pathology was encountered.  Satisfactory release had been achieved skin was closed using horizontal mattress 4 0 nylon suture.  Xeroform gauze 4x4s and 2 ABD pads were over wrapped with 3 in gauze dressing.  The patient tolerated the procedure well and was transferred to the recovery room in satisfactory condition.      Complications: No    Estimated Blood Loss (EBL): 5cc                        Condition: Good    Disposition: PACU - hemodynamically stable.    Attestation: I was present and scrubbed for the entire procedure.

## 2024-04-08 NOTE — ANESTHESIA POSTPROCEDURE EVALUATION
Anesthesia Post Evaluation    Patient: Radha Bowens    Procedure(s) Performed: Procedure(s) (LRB):  RELEASE, CARPAL TUNNEL (Left)    Final Anesthesia Type: general      Patient location during evaluation: PACU  Patient participation: Yes- Able to Participate  Level of consciousness: awake and alert and oriented  Post-procedure vital signs: reviewed and stable  Pain management: adequate  Airway patency: patent    PONV status at discharge: No PONV  Anesthetic complications: no      Cardiovascular status: blood pressure returned to baseline, stable and hemodynamically stable  Respiratory status: unassisted  Hydration status: euvolemic  Follow-up not needed.              Vitals Value Taken Time   /87 04/08/24 1018   Temp 36.3 °C (97.4 °F) 04/08/24 1004   Pulse 72 04/08/24 1021   Resp 19 04/08/24 1021   SpO2 97 % 04/08/24 1021   Vitals shown include unvalidated device data.      Event Time   Out of Recovery 10:15:00         Pain/Shonda Score: Shonda Score: 10 (4/8/2024 10:15 AM)

## 2024-04-08 NOTE — TRANSFER OF CARE
"Anesthesia Transfer of Care Note    Patient: Radha Bowens    Procedure(s) Performed: Procedure(s) (LRB):  RELEASE, CARPAL TUNNEL (Left)    Patient location: PACU    Anesthesia Type: MAC    Transport from OR: Transported from OR on room air with adequate spontaneous ventilation    Post pain: adequate analgesia    Post assessment: no apparent anesthetic complications and tolerated procedure well    Post vital signs: stable    Level of consciousness: awake, alert and oriented    Nausea/Vomiting: no nausea/vomiting    Complications: none    Transfer of care protocol was followed      Last vitals: Visit Vitals  BP (!) 180/88 (BP Location: Right arm, Patient Position: Sitting)   Pulse 74   Temp 36.5 °C (97.7 °F) (Temporal)   Resp 17   Ht 5' 2" (1.575 m)   Wt 86 kg (189 lb 7.8 oz)   LMP 03/28/2024   SpO2 100%   Breastfeeding No   BMI 34.66 kg/m²     "

## 2024-04-08 NOTE — DISCHARGE SUMMARY
[facility]  Discharge Note  Short Stay    Procedure(s) (LRB):  RELEASE, CARPAL TUNNEL (Left)      OUTCOME: Patient tolerated treatment/procedure well without complication and is now ready for discharge.    DISPOSITION: Home or Self Care    FINAL DIAGNOSIS:  Left carpal tunnel syndrome    FOLLOWUP: In clinic    DISCHARGE INSTRUCTIONS:    Discharge Procedure Orders   Diet general     Call MD for:  temperature >100.4     Call MD for:  persistent nausea and vomiting     Call MD for:  severe uncontrolled pain     Call MD for:  difficulty breathing, headache or visual disturbances     Call MD for:  redness, tenderness, or signs of infection (pain, swelling, redness, odor or green/yellow discharge around incision site)     Call MD for:  hives     Call MD for:  persistent dizziness or light-headedness     Call MD for:  extreme fatigue        TIME SPENT ON DISCHARGE:  20 minutes

## 2024-04-08 NOTE — DISCHARGE INSTRUCTIONS
After Hand Surgery  After surgery, the better you take care of yourself--especially your hand--the sooner it will heal. Follow your surgeons instructions. Try not to bump your hand, and dont move or lift anything while youre still wearing bandages, a splint, or a cast.  Care for your hand    Keep your hand elevated above heart level as much as possible for the first several days after surgery. This helps reduce swelling and pain.  To help prevent infection and speed healing, take care not to get your cast or bandages wet.  Keep dressing clean, dry, & intact until your follow up appointment.   Relieve pain as directed  Your surgeon may prescribe pain medicine or suggest you take an anti-inflammatory medicine. You might also be instructed to apply ice (or another cold source) to your hand. If you use ice cubes, put them in a plastic bag and rest it on top of your bandages. Leave the cold source on your hand for as long as its comfortable. Do this several times a day for the first few days after surgery. It may take several minutes before you can feel the cold through the cast or bandages.  Follow up with your surgeon  During a follow-up visit after surgery, your surgeon will check your progress. The stitches, bandages, splint, or cast may be removed. A new cast or splint may be placed. If your hand has healed enough, your surgeon may prescribe exercises.  Do prescribed hand exercises  Your surgeon may recommend that you do exercises. These may be done under the guidance of a physical or occupational therapist. The exercises strengthen your hand, help you regain flexibility, and restore proper function. Do the exercises as advised.  Call your surgeon if you have...  A fever higher than 100.4°F (38.0°C) taken by mouth  Side effects from your medicine, such as prolonged nausea  A wet or loose dressing, or a dressing that is too tight  Excessive bleeding  Increased, ongoing pain or numbness  Signs of infection (such  as drainage, warmth, or redness) at the incision site   Date Last Reviewed: 11/11/2015  © 7815-5966 [employer]. [address] [place] [address], [place] [address]. All rights reserved. This information is not intended as a substitute for professional medical care. Always follow your healthcare professional's instructions.

## 2024-04-10 ENCOUNTER — TELEPHONE (OUTPATIENT)
Dept: PREADMISSION TESTING | Facility: HOSPITAL | Age: 39
End: 2024-04-10
Payer: MEDICAID

## 2024-04-10 NOTE — TELEPHONE ENCOUNTER
Good afternoon,    This patient had surgery with Dr. Horta on 04/08/24. Patient is complaining about post-op pain and stating that the medications that Dr. Horta gave her are not working. She is requesting Dr. Horta to prescribe another pain medication. May you please assist?    Thanks,    Pre Admit Testing

## 2024-04-11 ENCOUNTER — TELEPHONE (OUTPATIENT)
Dept: ORTHOPEDICS | Facility: CLINIC | Age: 39
End: 2024-04-11
Payer: MEDICAID

## 2024-04-11 DIAGNOSIS — Z98.890 S/P CARPAL TUNNEL RELEASE: Primary | ICD-10-CM

## 2024-04-11 RX ORDER — OXYCODONE AND ACETAMINOPHEN 10; 325 MG/1; MG/1
1 TABLET ORAL EVERY 6 HOURS PRN
Qty: 15 TABLET | Refills: 0 | Status: SHIPPED | OUTPATIENT
Start: 2024-04-11

## 2024-04-11 NOTE — TELEPHONE ENCOUNTER
Attempted to call the patient to relay Lee Ann Woods PA-C message there was no answer so I left a very detailed message that she can  her prescription at [facility].            Lee Ann Woods PA-C  You4 minutes ago (11:31 AM)     RS  Please call pt and tell her that I sent in Percocet to the [facility] in [place].    Thanks      You routed conversation to Lee Ann Woods PA-C22 hours ago (1:03 PM)      Fouzia Colby, RN routed conversation to Rula Arreguin22 hours ago (1:02 PM)     Fouzia Colby, RN22 hours ago (1:02 PM)     MV  Good afternoon,     This patient had surgery with Dr. Horta on 04/08/24. Patient is complaining about post-op pain and stating that the medications that Dr. Horta gave her are not working. She is requesting Dr. Horta to prescribe another pain medication. May you please assist?     Thanks,     Pre Admit Testing         Note

## 2024-04-22 ENCOUNTER — TELEPHONE (OUTPATIENT)
Dept: ORTHOPEDICS | Facility: CLINIC | Age: 39
End: 2024-04-22
Payer: MEDICAID

## 2024-04-22 NOTE — TELEPHONE ENCOUNTER
Spoke to the patient an was able to get her scheduled for tomorrow with Lee Ann Woods PA-C @ 9:00 AM       ----- Message from Shirley Carmona sent at 4/22/2024  1:46 PM CDT -----  Patient would like to schedule an appointment to remove stiches. Call back number is  [phone]. [email or web address]

## 2024-04-23 ENCOUNTER — OFFICE VISIT (OUTPATIENT)
Dept: ORTHOPEDICS | Facility: CLINIC | Age: 39
End: 2024-04-23
Payer: MEDICAID

## 2024-04-23 VITALS — HEIGHT: 62 IN | WEIGHT: 189.63 LBS | BODY MASS INDEX: 34.89 KG/M2

## 2024-04-23 DIAGNOSIS — Z98.890 S/P CARPAL TUNNEL RELEASE: Primary | ICD-10-CM

## 2024-04-23 DIAGNOSIS — T81.49XA SURGICAL WOUND INFECTION: ICD-10-CM

## 2024-04-23 PROCEDURE — 1159F MED LIST DOCD IN RCRD: CPT | Mod: CPTII,,,

## 2024-04-23 PROCEDURE — 99213 OFFICE O/P EST LOW 20 MIN: CPT | Mod: PBBFAC

## 2024-04-23 PROCEDURE — 99024 POSTOP FOLLOW-UP VISIT: CPT | Mod: ,,,

## 2024-04-23 PROCEDURE — 99999 PR PBB SHADOW E&M-EST. PATIENT-LVL III: CPT | Mod: PBBFAC,,,

## 2024-04-23 RX ORDER — SULFAMETHOXAZOLE AND TRIMETHOPRIM 800; 160 MG/1; MG/1
1 TABLET ORAL 2 TIMES DAILY
Qty: 14 TABLET | Refills: 0 | Status: SHIPPED | OUTPATIENT
Start: 2024-04-23

## 2024-04-23 NOTE — PROGRESS NOTES
SUBJECTIVE:      Patient ID: Radha Bowens is a 38 y.o. female.    HPI: Ms. Bowens is here today for post-operative visit #1.  She is 15 days status post left carpal tunnel release by Dr. Horta on 4/8/24. She no-showed her last 2 appointments. She reports that she is still having severe pain.  Pain is 10/10, constant aching shooting.  She is taking tylenol and advil for pain.  She has been compliant with postop instructions and keeping the extremity dry. She denies fever, chills, and sweats since the time of the surgery.     Past Medical History:   Diagnosis Date    Anxiety     Depression     Hypertension      Past Surgical History:   Procedure Laterality Date    ABDOMINOPLASTY      CARPAL TUNNEL RELEASE Right 9/7/2023    Procedure: RELEASE, CARPAL TUNNEL;  Surgeon: Waylon Horta MD;  Location: [facility];  Service: Orthopedics;  Laterality: Right;  right carpal tunnel release    CARPAL TUNNEL RELEASE Left 4/8/2024    Procedure: RELEASE, CARPAL TUNNEL;  Surgeon: Waylon Horta MD;  Location: [facility];  Service: Orthopedics;  Laterality: Left;  left carpal tunnel release     Family History   Problem Relation Name Age of Onset    Stroke Mother      Hypertension Mother      Kidney failure Father      Diabetes Sister      Hypertension Sister      Hypertension Sister      Hypertension Brother      Cancer Maternal Grandmother      Cancer Paternal Grandmother      Cancer Paternal Grandfather       Social History     Socioeconomic History    Marital status: Unknown   Tobacco Use    Smoking status: Never     Passive exposure: Never    Smokeless tobacco: Never   Substance and Sexual Activity    Alcohol use: Not Currently    Drug use: Never    Sexual activity: Yes     Partners: Male     Medication List with Changes/Refills   Current Medications    ACETAMINOPHEN (TYLENOL) 325 MG TABLET    Take 325 mg by mouth every 6 (six) hours as needed for Pain.    ALPRAZOLAM (XANAX) 2 MG TAB    Take 2 mg by mouth 3  "(three) times daily.    AZITHROMYCIN (Z-CELESTINE) 250 MG TABLET        DICYCLOMINE (BENTYL) 20 MG TABLET    TAKE 1 TABLET BY MOUTH EVERY MORNING AND 1 TABLET BEFORE BEDTIME    FLUTICASONE PROPIONATE (FLONASE) 50 MCG/ACTUATION NASAL SPRAY    fluticasone propionate Take 1 spray(s) (Intranasal - bilaterally) 2 times per day 20221101 spray,suspension 2 times per day Intranasal No set duration recorded No set duration amount recorded active 50 mcg/actuation    LABETALOL (NORMODYNE) 200 MG TABLET    Take 200 mg by mouth.    METHOCARBAMOL (ROBAXIN) 500 MG TAB    TAKE 1 TABLET BY MOUTH EVERY MORNING AND BEFORE BEDTIME    NABUMETONE (RELAFEN) 500 MG TABLET    Take 500 mg by mouth 2 (two) times daily.    OXYCODONE-ACETAMINOPHEN (PERCOCET)  MG PER TABLET    Take 1 tablet by mouth every 6 (six) hours as needed for Pain.    OZEMPIC 0.25 MG OR 0.5 MG (2 MG/3 ML) PEN INJECTOR    INJECT 0.5 MG INTO THE SKIN WEEKLY    PHENTERMINE (ADIPEX-P) 37.5 MG TABLET    Take 37.5 mg by mouth.    TIZANIDINE (ZANAFLEX) 4 MG TABLET    Take 4 mg by mouth 2 (two) times daily.   Changed and/or Refilled Medications    Modified Medication Previous Medication    SULFAMETHOXAZOLE-TRIMETHOPRIM 800-160MG (BACTRIM DS) 800-160 MG TAB sulfamethoxazole-trimethoprim 800-160mg (BACTRIM DS) 800-160 mg Tab       Take 1 tablet by mouth 2 (two) times daily.    Take 1 tablet by mouth 2 (two) times daily.     Review of patient's allergies indicates:  No Known Allergies    OBJECTIVE:     Physical exam:    Vitals:    04/23/24 1005   Weight: 86 kg (189 lb 9.5 oz)   Height: 5' 2" (1.575 m)   PainSc: 10-Worst pain ever     Vital signs are stable, patient is afebrile.  Patient is well dressed and well groomed, no acute distress.  Alert and oriented to person, place, and time.    Left UE:  Incision is clean, dry, and intact. Mild swelling, moderate erythema to incision. Tender to palpation. No drainage, no exudate.  She is NVI.   Sutures in place.   2+ pulses noted.  Cap " refill <2 seconds  Motor intact to hand, able to make a composite fist    ASSESSMENT         Encounter Diagnoses   Name Primary?    S/P carpal tunnel release Yes    Surgical wound infection               15 days status post left carpal tunnel release    PLAN:           Radha was seen today for post-op evaluation.    Diagnoses and all orders for this visit:    S/P carpal tunnel release  -     sulfamethoxazole-trimethoprim 800-160mg (BACTRIM DS) 800-160 mg Tab; Take 1 tablet by mouth 2 (two) times daily.  -     Ambulatory referral/consult to Physical/Occupational Therapy; Future    Surgical wound infection  -     sulfamethoxazole-trimethoprim 800-160mg (BACTRIM DS) 800-160 mg Tab; Take 1 tablet by mouth 2 (two) times daily.        - PO instruction reviewed and provided to patient  - OT referral sent to [facility]  - Start bactrim BID x 7 d for infection. Instructed to take to completion  - The incision was cleaned with hydrogen peroxide. Sutures removed with no difficulty. Steri-Strips applied.   - Patient may use brace as needed for symptomatic relief.    - Patient should notify the office of any signs or symptoms of infection including fevers, erythema, purulent drainage, increasing pain.    - Follow up in 1 week for wound check/sooner if needed    POST OPERATIVE VISIT INSTRUCTIONS - Visit #2    1. No soaking the incision for at least 7-10 days. You may get it wet in the shower and use regular soap.    2. To avoid a hard, painful scar, we recommend you use Mederma and/or Silicone Scar patches. You may also use Cocoa Butter, Vitamin E oil, Coconut oil, etc.    You may start this 5-7 days after stitches are removed and when wound is completely closed.    - Mederma scar cream: scar massage over incision 1-2 times per day. You may use topical lotion or Vitamin E oil. Massage an additional few times a day to help the scar become soft and less sensitive.    - Silicone Scar Patches: cut and place over the  incision, then massage over the patch to help with scarring and sensitivity. Can be reused up to 10 days if you rinse it clean, let it dry out, then reapply.    Brands: Mepiform Silicone Scar Sheets (recommended), Scar Away, [employer] brand or generic pharmacy brand ([facility], [facility], [facility])    3. Continue range of motion exercises as instructed at todays visit.    4. You may take Tylenol 500mg and/or Ibuprofen 400mg every 4-6 hours with food for pain as tolerated as long as you do not have an allergy or medical condition that prevents you from taking them.    5. Therapy recommended: OT 1-2x/week    6. Immobilization: brace as needed    7. Lifting restrictions: start light at about 10lb and increase gradually as tolerated    8. Follow up: 1 week        Randi Seneca, PA-C [facility]

## 2024-05-07 ENCOUNTER — CLINICAL SUPPORT (OUTPATIENT)
Dept: REHABILITATION | Facility: HOSPITAL | Age: 39
End: 2024-05-07
Payer: MEDICAID

## 2024-05-07 DIAGNOSIS — R22.32 LOCALIZED SWELLING ON LEFT HAND: ICD-10-CM

## 2024-05-07 DIAGNOSIS — Z98.890 S/P CARPAL TUNNEL RELEASE: ICD-10-CM

## 2024-05-07 DIAGNOSIS — M25.60 DECREASED RANGE OF MOTION: ICD-10-CM

## 2024-05-07 DIAGNOSIS — R29.898 DECREASED GRIP STRENGTH OF LEFT HAND: ICD-10-CM

## 2024-05-07 DIAGNOSIS — M79.641 PAIN IN RIGHT HAND: ICD-10-CM

## 2024-05-07 DIAGNOSIS — L90.5 SCAR ADHERENT: Primary | ICD-10-CM

## 2024-05-07 PROCEDURE — 97165 OT EVAL LOW COMPLEX 30 MIN: CPT | Mod: PN

## 2024-05-07 PROCEDURE — 97530 THERAPEUTIC ACTIVITIES: CPT | Mod: PN

## 2024-05-07 NOTE — PLAN OF CARE
[facility]  Occupational Therapy Initial Evaluation      Name: Radha Bowens  Clinic Number: [id number]    Therapy Diagnosis:   Encounter Diagnoses   Name Primary?    S/P carpal tunnel release     Scar adherent Yes    Pain in right hand     Localized swelling on left hand     Decreased  strength of left hand     Decreased range of motion      Physician: Lee Ann Woods PA-C    Physician Orders: Eval and Treat  S/p left carpal tunnel release DOS 4/8/24. Eval and treat, modalities as needed  Medical Diagnosis: Z98.890 (ICD-10-CM) - S/P carpal tunnel release  Surgical Procedure and Date: RELEASE, CARPAL TUNNEL (Left), 4/8/2024  Evaluation Date: 5/7/2024  Insurance Authorization Period Expiration: 4/23/2025   Plan of Care Certification Period: 5/7/2024 - 7/2/2024  Date of Return to MD: None noted in medical chart for referring physician at this time.    Visit # / Visits authorized: 1 / pending   FOTO: 1/ 3 Initial=18 / Goal=55      Precautions:  Standard and high blood pressure (takes medication)     Time In: 2:25 pm   Time Out: 3:25 pm  Total Billable Time: 41 minutes (3TA)     Subjective      Date of Onset: patient reports that she has had carpal tunnel in L wrist/hand for at least 10 years duration.       History of Current Condition/Mechanism of Injury: Radha reports: due to long standing carpal tunnel in left hand, she underwent RELEASE, CARPAL TUNNEL (Left) on 4/8/2024 by Dr. Horta.  She reports that her numbness/tingling has resolved and she has no significant pain or tightness in the hand.  She does report pain at the surgical site and volar wrist, especially with certain movements like wrist flexion.       Falls: none    Involved Side: left   Dominant Side: Right    Mechanism of Injury: repetitive stress over time   Surgical Procedure: RELEASE, CARPAL TUNNEL (Left)   Imaging: Nerve Conduction Studies 6/20/2023  IMPRESSION  ABNORMAL study  2. 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     Current Functional Status   Home/Living environment: lives with their family    - 2 story home, 12 steps to enter    - DME: N/A       Limitation of Functional Status as follows:   ADLs/IADLs:     - Feeding: Minimal difficulty      - Bathing: Minimal difficulty, mostly for getting out of tub     - Dressing/Grooming: Minimal difficulty      - Home Management: Minimal to moderate difficulty      - Driving: No difficulty, but mostly using right hand          Patient's Goals for Therapy: "To decrease the pain with gripping."     Past Medical History/Physical Systems Review:   Radha Bowens  has a past medical history of Anxiety, Depression, and Hypertension.    Radha Bowens  has a past surgical history that includes Abdominoplasty; Carpal tunnel release (Right, 9/7/2023); and Carpal tunnel release (Left, 4/8/2024).    Radha has a current medication list which includes the following prescription(s): acetaminophen, alprazolam, azithromycin, dicyclomine, fluticasone propionate, labetalol, methocarbamol, nabumetone, oxycodone-acetaminophen, ozempic, phentermine, sulfamethoxazole-trimethoprim 800-160mg, and tizanidine, and the following Facility-Administered Medications: lactated " ringers.    Review of patient's allergies indicates:  No Known Allergies     Objective      Observation/Appearance:  Skin intact and moderate Edema present volar wrist.    2.5 cm in length well healed incision at volar wrist.  There is minimal redness at the incision and surrounding tissue, mostly ulnar side.  There is no significant warmth; there is no drainage noted.         Edema. Measured in centimeters.   5/7/2024 5/7/2024    Left Right   Proximal Wrist Crease 16.4 15.5   Web space  19.5 20.1       Hand ROM. Measured in degrees.   5/7/2024 5/7/2024      Left Right   Wrist:      Extension  65 72   Flexion  62 70   Radial deviation 20 30   Ulnar deviation  35 30   Supination   WFL  WFL    Pronation  WFL  WFL    Thumb:             Opposition To SF 5th MCP  To SF 5th MCP         Fisting:     Composite  WFL  WNL    Hook  WFL  WNL    Flat  WFL  WNL        Sensation  Patient denies numbness & tingling at this time. Light touch, temperature, sharp and dull are grossly intact in right forearm/wrist/hand.          Strength (Dyanmometer) and Pinch Strength (Pinch Gauge)  Deferred at this time due to post surgical protocol. Will assess at later session as appropriate.      Manual Muscle Testing  Deferred at this time due to post surgical protocol. Will assess at later session as appropriate.          Intake Outcome Measure for FOTO hand Survey    Therapist reviewed FOTO scores for Radha Bowens on 5/7/2024.   FOTO report - see Media section or FOTO account episode details.    Intake Score: 18       Treatment      Total Treatment time (time-based codes) separate from Evaluation: 41 minutes    Radha received the treatments listed below:      therapeutic exercises to develop strength, endurance, ROM, and flexibility for 8 minutes including:    Wrist stretch, 3 ways     Tendon glides          manual therapy techniques: Myofacial release, Soft tissue Mobilization, and scar massage were applied to the: left  hand/wrist/forearm for 23 minutes, including:  - use of hand techniques, cupping and IASTM tools to increase blood flow/circulation, improve soft tissue pliability and decrease pain.       supervised modalities after being cleared for contradictions:   Paraffin w/ MHP to left hand for 10 min, pre-tx to decrease pain & increase tissue extensibility     Patient Education and Home Exercises      Education provided:   -issued size C tubigrip for left hand/wrist to decrease edema and pain; patient educated on precautions to watch for and verbalized understanding   -Issued/reviewed initial HEP   -issued/reviewed scar massage techniques and edema control techniques; handouts issued    -role of OT, goals for OT, scheduling/cancellations, insurance limitations with patient.      Written Home Exercises Provided: yes.  Exercises were reviewed and Radha was able to demonstrate them prior to the end of the session.    Radha demonstrated good  understanding of the education provided.     Pt was advised to perform these exercises free of pain, and to stop performing them if pain occurs.    See EMR under Patient Instructions for exercises provided 5/7/2024.    Assessment      Radha Bowens is a 38 y.o. female referred to outpatient occupational therapy and presents with a medical diagnosis of Z98.890 (ICD-10-CM) - S/P carpal tunnel release.  Following medical record review it is determined that pt will benefit from occupational therapy services in order to maximize pain free and/or functional use of left hand/wrist. The following goals were discussed with the patient and patient is in agreement with them as to be addressed in the treatment plan. The patient's rehab potential is Good.     Anticipated barriers to occupational therapy: therapy attendance, compliance with HEP     Plan of care discussed with patient: Yes  Patient's spiritual, cultural and educational needs considered and patient is agreeable to the plan of care  and goals as stated below:     Medical Necessity is demonstrated by the following  Occupational Profile/History  Co-morbidities and personal factors that may impact the plan of care [x] LOW: Brief chart review  [] MODERATE: Expanded chart review   [] HIGH: Extensive chart review         Examination  Performance deficits relating to physical, cognitive or psychosocial skills that result in activity limitations and/or participation restrictions  [] LOW: addressing 1-3 Performance deficits  [x] MODERATE: 3-5 Performance deficits  [] HIGH: 5+ Performance deficits (please support below)    Moderate / High Support Documentation:    Physical:  Muscle Power/Strength  Muscle Endurance  Skin Integrity/Scar Formation  Edema   Strength  Pinch Strength  Pain    Cognitive:  No Deficits    Psychosocial:    No Deficits     Treatment Options [] LOW: Limited options  [x] MODERATE: Several options  [] HIGH: Multiple options      Decision Making/ Complexity Score: low       The following goals were discussed with the patient and patient is in agreement with them as to be addressed in the treatment plan.     Goals:   Short Term Goals: (in 4 weeks)  1) Patient will be independent with HEP in 1-2 visits  2) Decrease pain in left hand to no more than 2-3/10 at worst in ADL/IADL's  3) Increase total AROM in left wrist by 20 degrees for improved functioning in ADL/IADL's  4) Assess  strength for increased functional use in ADL/IADL's  5) Decrease edema in left wrist by 5 cm     Long Term Goals: (in 8 weeks)   1) Decrease pain in left wrist to no more than 1-2/10 at worst in ADL/IADL's  2) Increase AROM of left wrist to WFL for increased functioning in ADL/IADL's  3) Increase  strength by 20% of original measures for improved functioning in ADL/IADL's  4) Increased functioning in ADL/IADL's, as evidenced by a FOTO functional measure score of no less than 55  5) Decrease edema in left wrist to trace or none     Plan     Plan of  Care Certification: 5/7/2024 to 7/2/2024.     Outpatient Occupational Therapy 2 times weekly for 8 weeks to include the following interventions: Paraffin, Fluidotherapy, Manual therapy/joint mobilizations, Modalities for pain management, US 3 mhz, Therapeutic exercises/activities., Strengthening, Edema Control, and Scar Management.    Onesimo Richard OT    Physician's Signature: _________________________________________ Date: ________________

## 2024-05-08 ENCOUNTER — DOCUMENTATION ONLY (OUTPATIENT)
Dept: REHABILITATION | Facility: HOSPITAL | Age: 39
End: 2024-05-08
Payer: MEDICAID

## 2024-05-08 NOTE — PROGRESS NOTES
[facility]  Occupational Therapy Discharge Note    Name: Radha Bowens  Clinic Number: [id number]    Therapy Diagnosis:        Encounter Diagnoses   Name Primary?    Scar adherent Yes    Pain in right hand        Physician: Lee Ann Woods PA-C    Physician Orders: Eval and Treat  S/p right carpal tunnel release on 9/7/23. Eval and treat, modalities as needed  Medical Diagnosis: Z98.890 (ICD-10-CM) - S/P carpal tunnel release L76.82 (ICD-10-CM) - Pain at surgical incision  Evaluation Date: 1/4/2024      Date of Last visit: 3/7/2024  Total Visits Received: 8     ASSESSMENT      Patient has made good progress with edema, range of motion, and strength goals. Continue current therapy to address remaining reports of pillar pain, pinch strength deficits, and overall functional endurance of hand for ADLs/IADLs.    Discharge reason: Patient has not attended therapy since 3/7/2024    Discharge FOTO Score: 71    Goals:   Short Term Goals: (in 4 weeks)  1) Patient will be independent with HEP in 1-2 visits -Met 3/7/2025  2) Decrease pain in right hand/wrist to no more than 3-4/10 at worst in ADL/IADL's-Progressing  3) Increase right wrist strength all planes by 1 muscle grade in right hand for improved functioning in ADL/IADL's-Met 3/7/2025  4) Increase  strength by 15% of original measures for increased functional use in ADL/IADL's-Met 3/7/2025  5) Patient will perform light resistive pinch exercises 2 sets X 10 reps with one rest break in between for improved functioning in ADL/IADL's-Progressing     Long Term Goals: (in 8 weeks)   1) Decrease pain in right hand/wrist to no more than 1-2/10 at worst in ADL/IADL's -Progressing  2) Increase right wrist strength to WNL for improved functioning in ADL/IADL's-Progressing  3) Increase  strength by 20-25% of original measures for improved functioning in ADL/IADL's-Met 3/7/2025  4) Increase pinch strength by 15-20% of original measures for  improved functioning in ADL/IADL's-Progressing  5) Patient will perform light resistive strengthening right wrist 3 sets X 10 reps without rest breaks for improved functioning in ADL/IADL's -Met 3/7/2025  PLAN   This patient is discharged from Occupational Therapy      Onesimo Richard, OT

## 2024-05-20 ENCOUNTER — CLINICAL SUPPORT (OUTPATIENT)
Dept: REHABILITATION | Facility: HOSPITAL | Age: 39
End: 2024-05-20
Payer: MEDICAID

## 2024-05-20 DIAGNOSIS — M25.60 DECREASED RANGE OF MOTION: ICD-10-CM

## 2024-05-20 DIAGNOSIS — R22.32 LOCALIZED SWELLING ON LEFT HAND: ICD-10-CM

## 2024-05-20 DIAGNOSIS — M54.59 OTHER LOW BACK PAIN: ICD-10-CM

## 2024-05-20 DIAGNOSIS — M54.2 CERVICALGIA: Primary | ICD-10-CM

## 2024-05-20 DIAGNOSIS — L90.5 SCAR ADHERENT: Primary | ICD-10-CM

## 2024-05-20 DIAGNOSIS — R29.898 DECREASED GRIP STRENGTH OF LEFT HAND: ICD-10-CM

## 2024-05-20 PROCEDURE — 97530 THERAPEUTIC ACTIVITIES: CPT | Mod: PN

## 2024-05-20 NOTE — PROGRESS NOTES
[facility]  Occupational Therapy Treatment Note     Date: 5/20/2024  Name: Radha Bowens  Clinic Number: [id number]    Therapy Diagnosis:   Encounter Diagnoses   Name Primary?    Scar adherent Yes    Localized swelling on left hand     Decreased  strength of left hand     Decreased range of motion      Physician: Lee Ann Woods PA-C    Physician Orders: Eval and Treat  S/p left carpal tunnel release DOS 4/8/24. Eval and treat, modalities as needed  Medical Diagnosis: Z98.890 (ICD-10-CM) - S/P carpal tunnel release  Surgical Procedure and Date: RELEASE, CARPAL TUNNEL (Left), 4/8/2024  Evaluation Date: 5/7/2024  Insurance Authorization Period Expiration: 4/23/2025   Plan of Care Certification Period: 5/7/2024 - 7/2/2024  Date of Return to MD: None noted in medical chart for referring physician at this time.    Visit # / Visits authorized: 1 / 20 (plus initial evaluation)   FOTO: 1/ 3 Initial=18 / Goal=55       Precautions:  Standard and high blood pressure (takes medication)      Time In: 1:00 pm (12:45 appointment)   Time Out: 1:40 pm   Total Billable Time: 40 minutes (3TA)     Patient is 6 weeks post-op as of 5/20/2024.   Subjective     Patient reports: she has been performing scar massage and wrist stretches at home.  She has no numbness/tingling; this has resolved since surgery.      She was compliant with home exercise program given last session.   Response to previous treatment:first treatment following evaluation   Functional change: first treatment following evaluation     Pain: 5/10   Location: left wrist      Objective     Objective Measures updated at progress report unless specified.    Treatment     Radha received the treatments listed below:      therapeutic exercises to develop strength, endurance, ROM, and flexibility for 8 minutes including:    Wrist 3 ways over wedge  0#, 20 reps    Gripping with blue sponge, slow pace, 3 FA positions  10 reps each position    Isospheres  2 min, pronated         manual therapy techniques: Myofacial release, Manual Lymphatic Drainage, Soft tissue Mobilization, and scar massage were applied to the: L hand/wrist/forearm for 22 minutes, including:  - use of hand techniques, cupping and IASTM tools to increase blood flow/circulation, improve soft tissue pliability and decrease pain.       direct contact modalities after being cleared for contraindications: Ultrasound: 3 MHz, 0.8W/cm2, 50% pulsed, 10 min to L volar wrist over surgical scar area and surrounding, to decrease pain & edema, increase circulation and tissue extensibility. Patient tolerated treatment well without adverse effects. Therapist was in attendance throughout intervention.  attendance throughout intervention.    Patient Education and Home Exercises     Education provided:   -recommend silicone scar gel strips to decrease hypertrophic scarring at surgical incision   -issued size C tubigrip for left hand/wrist to decrease edema and pain; patient educated on precautions to watch for and verbalized understanding   -Issued/reviewed initial HEP   -issued/reviewed scar massage techniques and edema control techniques; handouts issued    -role of OT, goals for OT, scheduling/cancellations, insurance limitations with patient.  - Progress towards goals     Written Home Exercises Provided: Patient instructed to cont prior HEP.  Exercises were reviewed and Radha was able to demonstrate them prior to the end of the session.  Radha demonstrated good  understanding of the home exercise program provided. See electronic medical record under Patient Instructions for exercises provided during therapy sessions.       Assessment   Radha Bowens is a 38 y.o. female referred to outpatient occupational therapy and presents with a medical diagnosis of Z98.890 (ICD-10-CM) - S/P carpal tunnel release.  Scar tissue at volar wrist is moderately dense (more so on ulnar side) with moderate  adhesions beneath.  There is minimal redness with minimal swelling of surrounding tissue at ulnar side of incision.  No excessive warmth noted.  Incision well healed with no drainage noted.  Muscle tightness of volar/dorsal forearms moderate to minimal; this has improved since initial evaluation.  Weakness of wrist musculature is noted with therapeutic exercises.  Patient tolerated all therapeutic exercises without significant pain or difficulty.       Radha is progressing well towards her goals and there are no updates to goals at this time. Pt prognosis is Good.     Patient will continue to benefit from skilled outpatient occupational therapy to address the deficits listed in the problem list on initial evaluation provide patient/family education and to maximize patient's level of independence in the home and community environment.     Patient's spiritual, cultural and educational needs considered and patient agreeable to plan of care and goals.    Anticipated barriers to occupational therapy: therapy attendance, compliance with HEP    Goals:  Short Term Goals: (in 4 weeks)  1) Patient will be independent with HEP in 1-2 visits -Progressing   2) Decrease pain in left hand to no more than 2-3/10 at worst in ADL/IADL's -Progressing   3) Increase total AROM in left wrist by 20 degrees for improved functioning in ADL/IADL's  -Progressing   4) Assess  strength for increased functional use in ADL/IADL's  -Progressing   5) Decrease edema in left wrist by 5 cm  -Progressing       Long Term Goals: (in 8 weeks)   1) Decrease pain in left wrist to no more than 1-2/10 at worst in ADL/IADL's  2) Increase AROM of left wrist to WFL for increased functioning in ADL/IADL's  3) Increase  strength by 20% of original measures for improved functioning in ADL/IADL's  4) Increased functioning in ADL/IADL's, as evidenced by a FOTO functional measure score of no less than 55  5) Decrease edema in left wrist to trace or none      Plan   Plan of Care Certification: 5/7/2024 to 7/2/2024.      Outpatient Occupational Therapy 2 times weekly for 8 weeks to include the following interventions: Paraffin, Fluidotherapy, Manual therapy/joint mobilizations, Modalities for pain management, US 3 mhz, Therapeutic exercises/activities., Strengthening, Edema Control, and Scar Management.  Updates/Grading for next session: continue scar management, continue edema management, progress range of motion, progress to strengthening as tolerated     Onesimo Richard OT   5/20/2024

## 2024-05-21 NOTE — PROGRESS NOTES
"[facility] OUTPATIENT THERAPY AND WELLNESS  Physical Therapy Initial Evaluation    Date: 5/22/2024   Name: Radha Bowens  Clinic Number: [id number]    Therapy Diagnosis: No diagnosis found.  Physician: Joey Hampton MD    Physician Orders: PT Eval and Treat   Medical Diagnosis from Referral: M54.59 (ICD-10-CM) - Other low back pain   Evaluation Date: 5/22/2024  Authorization Period Expiration: 12/31/2024  Plan of Care Expiration: 8/22/2024  Visit # / Visits authorized: 1/ 1    PN DUE: 6/22/2024    Time In: ***  Time Out: ***  Total Appointment Time (timed & untimed codes): *** minutes    Precautions: Standard    Subjective   Date of onset: ***  History of current condition - Radha reports: ***     Medical History:   Past Medical History:   Diagnosis Date    Anxiety     Depression     Hypertension        Surgical History:   Radha Bowens  has a past surgical history that includes Abdominoplasty; Carpal tunnel release (Right, 9/7/2023); and Carpal tunnel release (Left, 4/8/2024).    Medications:   Radha has a current medication list which includes the following prescription(s): acetaminophen, alprazolam, azithromycin, dicyclomine, fluticasone propionate, labetalol, methocarbamol, nabumetone, oxycodone-acetaminophen, ozempic, phentermine, sulfamethoxazole-trimethoprim 800-160mg, and tizanidine, and the following Facility-Administered Medications: lactated ringers.    Allergies:   Review of patient's allergies indicates:  No Known Allergies     Imaging, {Mri/ctscan/bone scan:75462}: ***    Prior Therapy: ***  Social History: *** {LIVES WITH:08875}  Occupation: ***  Prior Level of Function: ***  Current Level of Function: ***    Pain:  Current {0-10:20507::"0"}/10, worst {0-10:20507::"0"}/10, best {0-10:20507::"0"}/10   Location: {RIGHT LEFT BILATERAL:41556} {LOCATION ON BODY:85970} {Pain Loc:63450}  Description: {Pain Description:04865}  Aggravating Factors: {Causes; Pain:74415}  Easing Factors: {Pain " (activities that relieve):93931}    Patients goals: ***    Objective       Observation: pt pleasant and appropriate throughout evaluation; A&O x 3       Posture:  ***    Lumbar Range of Motion:    % of normal motion Pain   Flexion ***   ***        Extension ***   ***        Left Side Bending *** ***        Right Side Bending *** ***        Left rotation   *** ***        Right Rotation   *** ***             Lower Extremity Strength  Right LE  Left LE    Knee extension: {AMB PT VESTIBULAR STRENGTH:80081} Knee extension: {AMB PT VESTIBULAR STRENGTH:35029}   Knee flexion: {AMB PT VESTIBULAR STRENGTH:44524} Knee flexion: {AMB PT VESTIBULAR STRENGTH:77240}   Hip flexion: {AMB PT VESTIBULAR STRENGTH:96735} Hip flexion: {AMB PT VESTIBULAR STRENGTH:99394}   Hip extension:  {AMB PT VESTIBULAR STRENGTH:10106} Hip extension: {AMB PT VESTIBULAR STRENGTH:02275}   Hip abduction: {AMB PT VESTIBULAR STRENGTH:43059} Hip abduction: {AMB PT VESTIBULAR STRENGTH:85549}   Hip adduction: {AMB PT VESTIBULAR STRENGTH:32491} Hip adduction {AMB PT VESTIBULAR STRENGTH:48335}   Ankle dorsiflexion: {AMB PT VESTIBULAR STRENGTH:46369} Ankle dorsiflexion: {AMB PT VESTIBULAR STRENGTH:18179}   Ankle plantarflexion: {AMB PT VESTIBULAR STRENGTH:28250} Ankle plantarflexion: {AMB PT VESTIBULAR STRENGTH:44220}         Special Tests:  -Repeated Flexion: ***  -Repeated Ext: ***  -Piriformis Test: ***  -Prone Instability Test: ***  -Bridge Test: ***      DTR:   Right Left Comment   Patellar (L3-4) {Reflexes:70944} {Reflexes:83671}    Achilles (S1) {Reflexes:30825} {Reflexes:90349}        Neuro Dynamic Testing:    Sciatic nerve:      SLR: R = ***     L = ***       Femoral Nerve:    Femoral nerve test: ***      Joint Mobility: ***    Palpation: ***    Sensation: ***    Flexibility: ***   Ely's test: R = *** degrees ; L = *** degrees   Popliteal Angle: R = *** degrees ; L = *** degrees   Jackson's test: R = *** ; L = ***   Gurinder test: R = *** ; L =  "***        Limitation/Restriction for FOTO *** Survey    Therapist reviewed FOTO scores for Radha Bowens on 5/22/2024.   FOTO documents entered into [employer] - see Media section.    Limitation Score: ***%         TREATMENT   Treatment Time In: ***  Treatment Time Out: ***  Total Treatment time (time-based codes) separate from Evaluation: *** minutes    Radha received therapeutic exercises to develop {AMB PT PROGRESS OBJECTIVE:66785} for *** minutes including:  ***    Radha received the following manual therapy techniques: {AMB PT PROGRESS MANUAL THERAPY:55255} were applied to the: *** for *** minutes, including:  ***    Radha participated in neuromuscular re-education activities to improve: {AMB PT PROGRESS NEURO RE-ED:19741} for *** minutes. The following activities were included:  ***    Radha participated in dynamic functional therapeutic activities to improve functional performance for ***  minutes, including:  ***    Radha participated in gait training to improve functional mobility and safety for ***  minutes, including:  ***    Radha received the following direct contact modalities after being cleared for contraindications: {AMB PT PROGRESS DIRECT CONTACT MODES:20405}    Radha received the following supervised modalities after being cleared for contradictions: {AMB PT SUPERVISED MODES:07357}    Radha received hot pack for *** minutes to ***.    Radha received cold pack for *** minutes to ***.    Home Exercises and Patient Education Provided    Education provided:   - - PT POC  - HEP  - PT prognosis and diagnosis  - all questions and concerns answered       Written Home Exercises Provided: yes.  Exercises were reviewed and Radha was able to demonstrate them prior to the end of the session.  Radha demonstrated {Desc; good/fair/poor:35650} understanding of the education provided.     See EMR under Patient Instructions for exercises provided {Blank single:24621::"10/22/2020","prior " "visit"}.    Assessment   Radha is a 38 y.o. female referred to outpatient Physical Therapy with a medical diagnosis of ***. *** The patient presents with impairments which include {impairments list:73600}.  These impairments are limiting patient's ability to ***. Pt prognosis is {REHAB PROGNOSIS OHS:62032} due to personal factors and co-morbidities listed below. Pt will benefit from skilled outpatient Physical Therapy to address the deficits stated above and in the chart below, provide pt/family education, and to maximize pt's level of independence.     Patient prognosis is {REHAB PROGNOSIS OHS:27934}.   Patientt will benefit from skilled outpatient Physical Therapy to address the deficits stated above and in the chart below, provide patient /family education, and to maximize patientt's level of independence.     Plan of care discussed with patient: Yes  Patient's spiritual, cultural and educational needs considered and patient is agreeable to the plan of care and goals as stated below:     Anticipated Barriers for therapy: ***    Medical Necessity is demonstrated by the following  History  Co-morbidities and personal factors that may impact the plan of care Co-morbidities:   See above    Personal Factors:   {Personal Factors:43895}     {Desc; low/moderate/high:878420}   Examination  Body Structures and Functions, activity limitations and participation restrictions that may impact the plan of care Body Regions:   {Body Regions:88640}    Body Systems:    {Body Systems:58330}    Participation Restrictions:   ***    Activity limitations:   Learning and applying knowledge  {Learning and applying knowledge:81090}    General Tasks and Commands  {Gen tasks and commands:58593}    Communication  {Communication:72496}    Mobility  {Mobility:87588}    Self care  {Self Care:40725}    Domestic Life  {Domestic Life:86105}    Interactions/Relationships  {Interactions/Relationships:73610}    Life Areas  {Life " Areas:51609}    Community and Social Life  {Community/Social Life:83186}         {Desc; low/moderate/high:177224}   Clinical Presentation {Clinical Presentation :48504} {Desc; low/moderate/high:830926}   Decision Making/ Complexity Score: {Desc; low/moderate/high:389131}     Goals:  Short Term Goals: *** weeks   1. Pt will be independent with HEP performance in order to continue PT progress at home.   2. Pt will report pain at worst of ***/10 or less in low back ***  3. Pt will report ability to ***  4. Pt will improve *** strength to ***/5 or greater in order to ***  5. ***    Long Term Goals: *** weeks   1. Pt will improve FOTO disability score to ***% disability or less in order to improve overall QOL and return to PLOF.   2. Pt will report pain at worst of ***/10 or less in low back  3. Pt will report ability to ***  4. Pt will improve *** strength to ***/5 or greater in order to***  5. ***    Plan   Plan of care Certification: 5/22/2024 to 8/22/2024.    Outpatient Physical Therapy 2 times weekly for 10 weeks to include the following interventions: Cervical/Lumbar Traction, Electrical Stimulation IFC, ect., Gait Training, Manual Therapy, Moist Heat/ Ice, Neuromuscular Re-ed, Patient Education, Self Care, Therapeutic Activities, Therapeutic Exercise, and Ultrasound. And any other therapies and modalities as clinically indicated and appropriate, including but not limited to FDN and telehealth. Pt may be seen by PTA as a part of pt's care team.       Jinny Valle, PT, DPT  5/21/2024

## 2024-05-22 ENCOUNTER — CLINICAL SUPPORT (OUTPATIENT)
Dept: REHABILITATION | Facility: HOSPITAL | Age: 39
End: 2024-05-22
Attending: INTERNAL MEDICINE
Payer: MEDICAID

## 2024-05-22 DIAGNOSIS — R29.898 WEAKNESS OF BOTH LOWER EXTREMITIES: ICD-10-CM

## 2024-05-22 DIAGNOSIS — M54.59 OTHER LOW BACK PAIN: ICD-10-CM

## 2024-05-22 DIAGNOSIS — G89.29 CHRONIC BILATERAL THORACIC BACK PAIN: Primary | ICD-10-CM

## 2024-05-22 DIAGNOSIS — M54.50 CHRONIC BILATERAL LOW BACK PAIN WITHOUT SCIATICA: ICD-10-CM

## 2024-05-22 DIAGNOSIS — G89.29 CHRONIC BILATERAL LOW BACK PAIN WITHOUT SCIATICA: ICD-10-CM

## 2024-05-22 DIAGNOSIS — M54.6 CHRONIC BILATERAL THORACIC BACK PAIN: Primary | ICD-10-CM

## 2024-05-22 DIAGNOSIS — Z74.09 IMPAIRED FUNCTIONAL MOBILITY, BALANCE, AND ENDURANCE: ICD-10-CM

## 2024-05-22 PROCEDURE — 97163 PT EVAL HIGH COMPLEX 45 MIN: CPT | Mod: PN

## 2024-05-22 NOTE — PROGRESS NOTES
[facility]  Physical Therapy Initial Evaluation    Date: 5/22/2024   Name: Radha Bowens  Clinic Number: [id number]    Therapy Diagnosis:   Encounter Diagnoses   Name Primary?    Other low back pain     Chronic bilateral thoracic back pain Yes    Weakness of both lower extremities     Impaired functional mobility, balance, and endurance     Chronic bilateral low back pain without sciatica      Physician: Joey Hampton MD    Physician Orders: PT Eval and Treat   Medical Diagnosis from Referral:    M54.59 (ICD-10-CM) - Other low back pain  Evaluation Date: 5/22/2024  Authorization Period Expiration: 12/31/2024  Plan of Care Expiration: 8/22/2024  Visit # / Visits authorized: 1/ 1    PN DUE: 6/22/2024    Time In: 2:30 PM  Time Out: 3:00 PM  Total Appointment Time (timed & untimed codes): 30 minutes    Precautions: Standard    Subjective   Date of onset: She reports that she has had back pain for years and that her pain has not changed since the last time she came to physical therapy.   History of current condition - Radha reports: that her entire back hurts. She states both hot water and being in the pool can increase her pain. The pain prevents her from sleeping through the entire night. She has much pain with prolonged sitting positions, standing up from prolonged sitting position, all household chores, and stair climbing. She states that going up stairs is hard and painful but descending stairs causes no pain. She reports that declined walking increases her pain and finds that walking backwards on a declined surface is easier and less painful overall. She has much difficulty getting in and out of the bed and chair. Overall she reports that sitting is worse than standing, however she still has much pain with standing. She keeps a chair at her stove due to pain with cooking and needing to sit to take a break. She also mentions that she has to sit in a chair to mop her house. She  "states that bilateral legs are achy/weak, right more than left. She states that she had previous injections in both her neck and back and found those not helpful with pain. She tried medication and states that it also did not help and now does not take any medicine. She states that her pain can sometimes cause her to have headaches. She reports that her back" feels like someone hit me in the back".      Medical History:   Past Medical History:   Diagnosis Date    Anxiety     Depression     Hypertension        Surgical History:   Radha Bowens  has a past surgical history that includes Abdominoplasty; Carpal tunnel release (Right, 9/7/2023); and Carpal tunnel release (Left, 4/8/2024).    Medications:   Radha has a current medication list which includes the following prescription(s): acetaminophen, alprazolam, azithromycin, dicyclomine, fluticasone propionate, labetalol, methocarbamol, nabumetone, oxycodone-acetaminophen, ozempic, phentermine, sulfamethoxazole-trimethoprim 800-160mg, and tizanidine, and the following Facility-Administered Medications: lactated ringers.    Allergies:   Review of patient's allergies indicates:  No Known Allergies     Imaging:   X-Ray Lumbar Spine 2 Or 3 Views: No acute fracture or malalignment is appreciated. The disc spaces are well preserved.    Prior Therapy: yes  Social History:  lives with their family  Occupation: Not currently working due to pain   Prior Level of Function: independent with functional mobility, recreational activity, household chores, work duties, and ADLs  Current Level of Function: unable to work, pain with household chores, stair climbing, declined walking, transitional movements, laying supine and lying prone    Pain:  Current 10/10, worst 10/10, best 10/10   Location: bilateral back pain  Description: Aching and Shooting  Aggravating Factors: all daily activities   Easing Factors: "Go outside and sit by the pool and lay back"    Patients goals: " strengthen bilateral legs, decreased pain, return to walking/working out program     Objective   Observation: pt pleasant and appropriate throughout evaluation; A&O x 3     Posture:  In static standing, pt has increased lumbar lordosis     Sensation: Patient reports bilateral numbness and tingling in bilateral anterior lower extremities. Light touch impaired to LLE as compared to RLE.     Gait Analysis: Patient ambulates with none and the following impairments: lumbar lordosis, decreased arm swing, decreased trunk rotation, decreased right stance time, bilateral lateral trunk lean     Lumbar Range of Motion:    % of normal motion Pain   Flexion 100%   Relief coming up        Extension 50%   Pain between shoulder blades        Left Side Bending 75% Pain between shoulder blades        Right Side Bending 75% Pain between shoulder blades        Left rotation   100% Pain between shoulder blades        Right Rotation   100% Pain between shoulder blades             Lower Extremity Strength: Right very weak  Right LE  Left LE    Knee extension: 4/5 Knee extension: 5/5   Knee flexion: 4/5 Knee flexion: 4+/5   Hip flexion: 4/5; midline LBP Hip flexion: 4+/5; midline LBP   Hip extension:  4/5 Hip extension: 4+/5   Hip abduction: 4+/5 Hip abduction: 4+/5   Hip adduction: 4+/5 Hip adduction 4+/5   Ankle dorsiflexion: 4-/5 Ankle dorsiflexion: 4+/5   Ankle plantarflexion: 4+/5 Ankle plantarflexion: 4+/5     Special Tests:  -Bridge Test: Much difficulty; no increased pain in back   - SLUMP: Positive bilaterally (L>R)  - Single leg: LLE held greater than 30 seconds with no loss of balance; RLE with loss of balance at 15 seconds with much difficulty   - DL Heel raises: Able to complete 5 repetitions with full range of motion, but reports RLE is harder than LLE, bilateral ankle pain, and bilateral back pain.   - 5 time sit to stand: 35 seconds  - Leg Length Discrepancy: No obliquity noted  - Long Leg distraction: Good relief with RLE;  no change with LLE    Joint Mobility: normal thoracic and lumbar mobility with PA's; increased pain along T7-12 and L5-S2    Palpation: TTP (B) thoracic and lumbar paraspinals, (B) PSIS, (B) SIJ, (R) greater trochanter and IT band, (L) piriformis     Flexibility: Impaired piriformis flexibility of LLE as compared to RLE    Function: FOTO    Home Exercises and Patient Education Provided    Education provided:   - PT POC  - HEP  - PT prognosis and diagnosis  - all questions and concerns answered     Written Home Exercises Provided: yes.  Exercises were reviewed and Radha was able to demonstrate them prior to the end of the session.  Radha demonstrated good  understanding of the education provided.     See EMR under Patient Instructions for exercises provided 5/22/2024.    Assessment   Radha is a 38 y.o. female referred to outpatient Physical Therapy with a medical diagnosis of M54.59 (ICD-10-CM) - Other low back pain. The patient presents with impairments which include impairments list: ROM, strength, endurance, muscle length, balance, posture, gait mechanics, core strength and stability, functional movement patterns, and sensation.  These impairments are limiting patient's ability to perform household chores, stair climbing, declined walking, transitional movements, laying supine and lying prone without increased pain and difficulty. Pt prognosis is Fair due to personal factors and co-morbidities listed below. Pt will benefit from skilled outpatient Physical Therapy to address the deficits stated above and in the chart below, provide pt/family education, and to maximize pt's level of independence.     Plan of care discussed with patient: Yes  Patient's spiritual, cultural and educational needs considered and patient is agreeable to the plan of care and goals as stated below:     Anticipated Barriers for therapy: pain, co-morbidities    Medical Necessity is demonstrated by the following:    History  Co-morbidities and personal factors that may impact the plan of care [] LOW: no personal factors / co-morbidities  [] MODERATE: 1-2 personal factors / co-morbidities  [x] HIGH: 3+ personal factors / co-morbidities    Moderate / High Support Documentation:   Co-morbidities affecting plan of care: See Above    Personal Factors:   age  coping style     Examination  Body Structures and Functions, activity limitations and participation restrictions that may impact the plan of care [] LOW: addressing 1-2 elements  [] MODERATE: 3+ elements  [x] HIGH: 4+ elements (please support below)    Moderate / High Support Documentation: low back pain, thoracic back pain, lower extremity weakness, impaired gait, impaired posture, impaired balance, impaired functional mobility      Clinical Presentation [] LOW: stable  [x] MODERATE: Evolving  [] HIGH: Unstable     Decision Making/ Complexity Score: high       Goals:  Short Term Goals: 4 weeks   Patient will demonstrate independence with HEP in order to progress toward functional independence  Pt will demonstrate improved pain by reports of less than or equal to 7/10 worst pain on the verbal rating scale in order to progress toward maximal functional ability and improve QOL.  Patient will improve right lower extremity strength by 1/2 MMT for improved strength needed to perform daily activities.   Patient will perform 5 time sit to  less than 25 seconds for improved strength and decreased fall risk.   Patient will improve right single leg stance to greater than or equal to 20 seconds for improved balance.     Long Term Goals: 8-10 weeks   Patient will demonstrate improved function as indicated by a functional score of 31 on the FOTO.  Pt will demonstrate improved pain by reports of less than or equal to 5/10 worst pain on the verbal rating scale in order to progress toward maximal functional ability and improve QOL.  Patient will improve bilateral lower extremity  strength to greater than or equal to 4+/5 MMT for improved strength needed to return to prior level of function.   Patient will perform 5 time sit to  less than 15 seconds for improved strength and decreased fall risk.   Patient will improve right single leg stance to greater than or equal to 25 seconds for improved balance.   Patient will report that she has started to work out at home or at the gym for return to prior level of function.     Plan   Plan of care Certification: 5/22/2024 to 8/22/2024.    Outpatient Physical Therapy 2 times weekly for 10 weeks to include the following interventions: Cervical/Lumbar Traction, Electrical Stimulation  , Manual Therapy, Moist Heat/ Ice, Neuromuscular Re-ed, Orthotic Management and Training, Patient Education, Self Care, Therapeutic Activities, and Therapeutic Exercise. And any other therapies and modalities as clinically indicated and appropriate, including but not limited to FDN and telehealth. Pt may be seen by PTA as a part of pt's care team.     Chana Benson, PT, DPT  5/23/2024

## 2024-05-23 ENCOUNTER — CLINICAL SUPPORT (OUTPATIENT)
Dept: REHABILITATION | Facility: HOSPITAL | Age: 39
End: 2024-05-23
Payer: MEDICAID

## 2024-05-23 DIAGNOSIS — M25.60 DECREASED RANGE OF MOTION: ICD-10-CM

## 2024-05-23 DIAGNOSIS — R22.32 LOCALIZED SWELLING ON LEFT HAND: ICD-10-CM

## 2024-05-23 DIAGNOSIS — L90.5 SCAR ADHERENT: Primary | ICD-10-CM

## 2024-05-23 DIAGNOSIS — R29.898 DECREASED GRIP STRENGTH OF LEFT HAND: ICD-10-CM

## 2024-05-23 PROCEDURE — 97530 THERAPEUTIC ACTIVITIES: CPT | Mod: PN

## 2024-05-23 NOTE — PROGRESS NOTES
[facility]  Occupational Therapy Treatment Note     Date: 5/23/2024  Name: Radha Bowens  Clinic Number: [id number]    Therapy Diagnosis:   Encounter Diagnoses   Name Primary?    Scar adherent Yes    Localized swelling on left hand     Decreased  strength of left hand     Decreased range of motion      Physician: Lee Ann Woods PA-C    Physician Orders: Eval and Treat  S/p left carpal tunnel release DOS 4/8/24. Eval and treat, modalities as needed  Medical Diagnosis: Z98.890 (ICD-10-CM) - S/P carpal tunnel release  Surgical Procedure and Date: RELEASE, CARPAL TUNNEL (Left), 4/8/2024  Evaluation Date: 5/7/2024  Insurance Authorization Period Expiration: 4/23/2025   Plan of Care Certification Period: 5/7/2024 - 7/2/2024  Date of Return to MD: None noted in medical chart for referring physician at this time.    Visit # / Visits authorized: 2 / 20 (plus initial evaluation)   FOTO: 1/ 3 Initial=18 / Goal=55       Precautions:  Standard and high blood pressure (takes medication)      Time In: 2:20 pm  Time Out: 3:05 pm   Total Billable Time: 45 minutes (3TA)     Patient is 6 weeks post-op as of 5/23/2024.   Subjective     Patient reports: she has been performing scar massage and wrist stretches at home.  She has purchased and is using silicone scar gel sheets.      She was compliant with home exercise program given last session.   Response to previous treatment: decreased swelling at wrist   Functional change: None noted/reported at this time.       Pain: 5/10    Location: left wrist      Objective     Objective Measures updated at progress report unless specified.    Treatment     Radha received the treatments listed below:      therapeutic exercises to develop strength, endurance, ROM, and flexibility for 10 minutes including:    Wrist 3 ways over wedge  1#, 2/10 reps    Gripping with blue sponge, slow pace, 3 FA positions  20 reps each position   Isospheres, pronated and supinated    2 min each   Hand helper, 2 yellow RB  20 reps        manual therapy techniques: Myofacial release, Manual Lymphatic Drainage, Soft tissue Mobilization, and scar massage were applied to the: L hand/wrist/forearm for 25 minutes, including:  - use of hand techniques, cupping and IASTM tools to increase blood flow/circulation, improve soft tissue pliability and decrease pain.       direct contact modalities after being cleared for contraindications: Ultrasound: 3 MHz, 0.8W/cm2, 50% pulsed, 10 min to L volar wrist over surgical scar area and surrounding, to decrease pain & edema, increase circulation and tissue extensibility. Patient tolerated treatment well without adverse effects. Therapist was in attendance throughout intervention.  attendance throughout intervention.    Patient Education and Home Exercises     Education provided:   -recommend silicone scar gel strips to decrease hypertrophic scarring at surgical incision   -issued size C tubigrip for left hand/wrist to decrease edema and pain; patient educated on precautions to watch for and verbalized understanding   -Issued/reviewed initial HEP   -issued/reviewed scar massage techniques and edema control techniques; handouts issued    -role of OT, goals for OT, scheduling/cancellations, insurance limitations with patient.  - Progress towards goals     Written Home Exercises Provided: Patient instructed to cont prior HEP.  Exercises were reviewed and Radha was able to demonstrate them prior to the end of the session.  Radha demonstrated good  understanding of the home exercise program provided. See electronic medical record under Patient Instructions for exercises provided during therapy sessions.       Assessment   Radha Bowens is a 38 y.o. female referred to outpatient occupational therapy and presents with a medical diagnosis of Z98.890 (ICD-10-CM) - S/P carpal tunnel release.  Scar tissue at volar wrist is moderately dense (more so on ulnar side) with  moderate adhesions beneath.  Minimal swelling of surrounding tissue noted, more on ulnar side of incision.  Redness present on ulnar side of incision has decreased and is minimal to none.  Muscle tightness of volar/dorsal forearms minimal; this continues to improve each session.  Weakness of wrist musculature is noted with therapeutic exercises.  Patient tolerated all therapeutic exercises without significant pain or difficulty.       Radha is progressing well towards her goals and there are no updates to goals at this time. Pt prognosis is Good.     Patient will continue to benefit from skilled outpatient occupational therapy to address the deficits listed in the problem list on initial evaluation provide patient/family education and to maximize patient's level of independence in the home and community environment.     Patient's spiritual, cultural and educational needs considered and patient agreeable to plan of care and goals.    Anticipated barriers to occupational therapy: therapy attendance, compliance with HEP    Goals:  Short Term Goals: (in 4 weeks)  1) Patient will be independent with HEP in 1-2 visits -Progressing   2) Decrease pain in left hand to no more than 2-3/10 at worst in ADL/IADL's -Progressing   3) Increase total AROM in left wrist by 20 degrees for improved functioning in ADL/IADL's  -Progressing   4) Assess  strength for increased functional use in ADL/IADL's  -Progressing   5) Decrease edema in left wrist by 5 cm  -Progressing       Long Term Goals: (in 8 weeks)   1) Decrease pain in left wrist to no more than 1-2/10 at worst in ADL/IADL's  2) Increase AROM of left wrist to WFL for increased functioning in ADL/IADL's  3) Increase  strength by 20% of original measures for improved functioning in ADL/IADL's  4) Increased functioning in ADL/IADL's, as evidenced by a FOTO functional measure score of no less than 55  5) Decrease edema in left wrist to trace or none     Plan   Plan of  Care Certification: 5/7/2024 to 7/2/2024.      Outpatient Occupational Therapy 2 times weekly for 8 weeks to include the following interventions: Paraffin, Fluidotherapy, Manual therapy/joint mobilizations, Modalities for pain management, US 3 mhz, Therapeutic exercises/activities., Strengthening, Edema Control, and Scar Management.  Updates/Grading for next session: continue scar management, continue edema management, progress range of motion, progress to strengthening as tolerated     Onesimo Richard OT   5/23/2024

## 2024-05-28 ENCOUNTER — CLINICAL SUPPORT (OUTPATIENT)
Dept: REHABILITATION | Facility: HOSPITAL | Age: 39
End: 2024-05-28
Payer: MEDICAID

## 2024-05-28 DIAGNOSIS — G89.29 CHRONIC BILATERAL THORACIC BACK PAIN: ICD-10-CM

## 2024-05-28 DIAGNOSIS — M54.50 CHRONIC BILATERAL LOW BACK PAIN WITHOUT SCIATICA: Primary | ICD-10-CM

## 2024-05-28 DIAGNOSIS — R29.898 WEAKNESS OF BOTH LOWER EXTREMITIES: ICD-10-CM

## 2024-05-28 DIAGNOSIS — G89.29 CHRONIC BILATERAL LOW BACK PAIN WITHOUT SCIATICA: Primary | ICD-10-CM

## 2024-05-28 DIAGNOSIS — M54.6 CHRONIC BILATERAL THORACIC BACK PAIN: ICD-10-CM

## 2024-05-28 DIAGNOSIS — Z74.09 IMPAIRED FUNCTIONAL MOBILITY, BALANCE, AND ENDURANCE: ICD-10-CM

## 2024-05-28 PROCEDURE — 97110 THERAPEUTIC EXERCISES: CPT | Mod: PN,CQ

## 2024-05-28 PROCEDURE — 97140 MANUAL THERAPY 1/> REGIONS: CPT | Mod: PN,CQ

## 2024-05-28 NOTE — PROGRESS NOTES
"[facility]   Physical Therapy Treatment Note      Name: Radha Bowens  Clinic Number: [id number]    Therapy Diagnosis:   Encounter Diagnoses   Name Primary?    Chronic bilateral low back pain without sciatica Yes    Impaired functional mobility, balance, and endurance     Chronic bilateral thoracic back pain     Weakness of both lower extremities      Physician: Joey Hampton MD    Visit Date: 5/28/2024    Physician Orders: PT Eval and Treat   Medical Diagnosis from Referral:               M54.59 (ICD-10-CM) - Other low back pain  Evaluation Date: 5/22/2024  Authorization Period Expiration: 12/31/2024  Plan of Care Expiration: 8/22/2024  Visit # / Visits authorized: 1/ 18 (+eval)     PN DUE: 6/22/2024     Time In: 3:05 PM  Time Out: 3:50 PM  Total Appointment Time (timed & untimed codes): 45 minutes     Precautions: Standard    PTA Visit #: 1/5       Subjective     Patient reports: Back pain at the moment.  Feels better with movement.  She was compliant with home exercise program.  Response to previous treatment: first follow up  Functional change:     Pain:  Current 10/10, worst 10/10, best 10/10   Location: bilateral back pain  Description: Aching and Shooting  Aggravating Factors: all daily activities   Easing Factors: "Go outside and sit by the pool and lay back"    Objective      Objective Measures updated at progress report unless specified.     Treatment     Radha received the treatments listed below:      therapeutic exercises to develop strength, endurance, ROM, flexibility, posture, and core stabilization for 30 minutes including:  Stationary bike, lvl5, 6'  Gastroc stretch on slant board, lvl3, 3x1'  Heel raises, 3x15  Seated Lumbar flexion stretch, 3x10  DKTC w/ SB, 3x10  F4 LTR, 1x15  Repeated sit to stands w/ yellow weighted ball, 3x10  Hip 3-way on foam, 1x10  Isometric abdominals w/ SB, 1x10     manual therapy techniques: Joint mobilizations, Manual traction, " Myofacial release, Soft tissue Mobilization, and Friction Massage were applied for 15 minutes, including:  Sciatic nerve glides and stretch    neuromuscular re-education activities to improve: Balance, Coordination, Kinesthetic, Sense, Proprioception, and Posture for 0 minutes. The following activities were included:      therapeutic activities to improve functional performance for 0  minutes, including:          Patient Education and Home Exercises       Education provided:   - rationale for treatment    Written Home Exercises Provided: Patient instructed to cont prior HEP. Exercises were reviewed and Radha was able to demonstrate them prior to the end of the session.  Radha demonstrated good  understanding of the education provided. See Electronic Medical Record under Patient Instructions for exercises provided during therapy sessions    Assessment     Patient reports to therapy for first follow up after evaluation.  She presents with back pain.  Initiated POC with interventions targeting strength and ROM for core, hips, and BLE.  She was able to perform all interventions without an increase in pain. Movement seemed to have helped symptoms. Encouraged continued participation in HEP.  Will inquire response to treatment next visit.     Radha Is progressing well towards her goals.   Patient prognosis is Good.     Patient will continue to benefit from skilled outpatient physical therapy to address the deficits listed in the problem list box on initial evaluation, provide pt/family education and to maximize pt's level of independence in the home and community environment.     Patient's spiritual, cultural and educational needs considered and pt agreeable to plan of care and goals.     Anticipated barriers to physical therapy: pain, co-morbidities    Goals: Short Term Goals: 4 weeks   Patient will demonstrate independence with HEP in order to progress toward functional independence  Pt will demonstrate improved  pain by reports of less than or equal to 7/10 worst pain on the verbal rating scale in order to progress toward maximal functional ability and improve QOL.  Patient will improve right lower extremity strength by 1/2 MMT for improved strength needed to perform daily activities.   Patient will perform 5 time sit to  less than 25 seconds for improved strength and decreased fall risk.   Patient will improve right single leg stance to greater than or equal to 20 seconds for improved balance.      Long Term Goals: 8-10 weeks   Patient will demonstrate improved function as indicated by a functional score of 31 on the FOTO.  Pt will demonstrate improved pain by reports of less than or equal to 5/10 worst pain on the verbal rating scale in order to progress toward maximal functional ability and improve QOL.  Patient will improve bilateral lower extremity strength to greater than or equal to 4+/5 MMT for improved strength needed to return to prior level of function.   Patient will perform 5 time sit to  less than 15 seconds for improved strength and decreased fall risk.   Patient will improve right single leg stance to greater than or equal to 25 seconds for improved balance.   Patient will report that she has started to work out at home or at the gym for return to prior level of function.     Plan     Plan of care Certification: 5/22/2024 to 8/22/2024.     Outpatient Physical Therapy 2 times weekly for 10 weeks to include the following interventions: Cervical/Lumbar Traction, Electrical Stimulation  , Manual Therapy, Moist Heat/ Ice, Neuromuscular Re-ed, Orthotic Management and Training, Patient Education, Self Care, Therapeutic Activities, and Therapeutic Exercise. And any other therapies and modalities as clinically indicated and appropriate, including but not limited to FDN and telehealth. Pt may be seen by PTA as a part of pt's care team.     Rafael Taylor PTA

## 2024-06-10 ENCOUNTER — CLINICAL SUPPORT (OUTPATIENT)
Dept: REHABILITATION | Facility: HOSPITAL | Age: 39
End: 2024-06-10
Payer: MEDICAID

## 2024-06-10 DIAGNOSIS — L90.5 SCAR ADHERENT: Primary | ICD-10-CM

## 2024-06-10 DIAGNOSIS — R22.32 LOCALIZED SWELLING ON LEFT HAND: ICD-10-CM

## 2024-06-10 DIAGNOSIS — R29.898 DECREASED GRIP STRENGTH OF LEFT HAND: ICD-10-CM

## 2024-06-10 DIAGNOSIS — M25.60 DECREASED RANGE OF MOTION: ICD-10-CM

## 2024-06-10 PROCEDURE — 97530 THERAPEUTIC ACTIVITIES: CPT | Mod: PN

## 2024-06-10 NOTE — PROGRESS NOTES
"[facility]  Occupational Therapy Treatment Note     Date: 6/10/2024  Name: Radha Bowens  Clinic Number: [id number]    Therapy Diagnosis:   Encounter Diagnoses   Name Primary?    Scar adherent Yes    Localized swelling on left hand     Decreased  strength of left hand     Decreased range of motion        Physician: No ref. provider found    Physician Orders: Eval and Treat  S/p left carpal tunnel release DOS 4/8/24. Eval and treat, modalities as needed  Medical Diagnosis: Z98.890 (ICD-10-CM) - S/P carpal tunnel release  Surgical Procedure and Date: RELEASE, CARPAL TUNNEL (Left), 4/8/2024  Evaluation Date: 5/7/2024  Insurance Authorization Period Expiration: 4/23/2025   Plan of Care Certification Period: 5/7/2024 - 7/2/2024  Date of Return to MD: None noted in medical chart for referring physician at this time.    Visit # / Visits authorized: 3 / 12 (plus initial evaluation)   FOTO: 1/ 3 Initial=18 / Goal=55       Precautions:  Standard and high blood pressure (takes medication)      Time In: 1:42 pm (1:30 appointment)   Time Out: 2:20 pm   Total Billable Time: 38 minutes (3TA)     Patient is approximately 8 weeks post-op as of 6/10/2024.   Subjective     Patient reports: she continues to perform scar massage and wrist stretches at home.  She has purchased and is using silicone scar gel sheets.  She continues to have some discomfort at volar wrist with flexion of wrist, especially when folding towels     She was compliant with home exercise program given last session.   Response to previous treatment: gripping has improved, her "feeling" is better   Functional change: no issues using with light ADLs      Pain: 4/10    Location: left wrist      Objective     Objective Measures updated at progress report unless specified.    Treatment     Radha received the treatments listed below:      therapeutic exercises to develop strength, endurance, ROM, and flexibility for 15 minutes " including:    Wrist 3 ways over wedge  2#, 2/10 reps     PHG, setting #2, 22#  25 reps      Isospheres, supinated   2 min each    Thumb radial/palmar abduction, yellow RB  20 reps each         manual therapy techniques: Myofacial release, Manual Lymphatic Drainage, Soft tissue Mobilization, and scar massage were applied to the: L hand/wrist/forearm for 15 minutes, including:  - use of hand techniques, cupping and IASTM tools to increase blood flow/circulation, improve soft tissue pliability and decrease pain.         Supervised modalities after being cleared for contradictions:   Paraffin w/ MHP to left hand/wrist for 8 min, pre-tx to decrease pain & increase tissue extensibility     Patient Education and Home Exercises     Education provided:   -recommend silicone scar gel strips to decrease hypertrophic scarring at surgical incision   -issued size C tubigrip for left hand/wrist to decrease edema and pain; patient educated on precautions to watch for and verbalized understanding   -Issued/reviewed initial HEP   -issued/reviewed scar massage techniques and edema control techniques; handouts issued    -role of OT, goals for OT, scheduling/cancellations, insurance limitations with patient.  - Progress towards goals     Written Home Exercises Provided: Patient instructed to cont prior HEP.  Exercises were reviewed and Radha was able to demonstrate them prior to the end of the session.  Radha demonstrated good  understanding of the home exercise program provided. See electronic medical record under Patient Instructions for exercises provided during therapy sessions.       Assessment   Radha Bowens is a 38 y.o. female referred to outpatient occupational therapy and presents with a medical diagnosis of Z98.890 (ICD-10-CM) - S/P carpal tunnel release.  Scar tissue at volar wrist is moderately dense with moderate adhesions beneath.  Swelling on ulnar side of incision is very minimal, decreased from previous  attended sessions.  There is no longer redness present on ulnar side of incision.  Muscle tightness of volar/dorsal forearms improving and is minimal  Weakness and decreased endurance of hand is noted with therapeutic exercises.  Patient tolerated all therapeutic exercises without significant pain or difficulty.       Radha is progressing well towards her goals and there are no updates to goals at this time. Pt prognosis is Good.     Patient will continue to benefit from skilled outpatient occupational therapy to address the deficits listed in the problem list on initial evaluation provide patient/family education and to maximize patient's level of independence in the home and community environment.     Patient's spiritual, cultural and educational needs considered and patient agreeable to plan of care and goals.    Anticipated barriers to occupational therapy: therapy attendance, compliance with HEP    Goals:  Short Term Goals: (in 4 weeks)  1) Patient will be independent with HEP in 1-2 visits -Met 6/120/2024  2) Decrease pain in left hand to no more than 2-3/10 at worst in ADL/IADL's -Progressing   3) Increase total AROM in left wrist by 20 degrees for improved functioning in ADL/IADL's  -Progressing   4) Assess  strength for increased functional use in ADL/IADL's  -Progressing   5) Decrease edema in left wrist by 5 cm  -Progressing       Long Term Goals: (in 8 weeks)   1) Decrease pain in left wrist to no more than 1-2/10 at worst in ADL/IADL's  2) Increase AROM of left wrist to WFL for increased functioning in ADL/IADL's  3) Increase  strength by 20% of original measures for improved functioning in ADL/IADL's  4) Increased functioning in ADL/IADL's, as evidenced by a FOTO functional measure score of no less than 55  5) Decrease edema in left wrist to trace or none     Plan   Plan of Care Certification: 5/7/2024 to 7/2/2024.      Outpatient Occupational Therapy 2 times weekly for 8 weeks to include the  following interventions: Paraffin, Fluidotherapy, Manual therapy/joint mobilizations, Modalities for pain management, US 3 mhz, Therapeutic exercises/activities., Strengthening, Edema Control, and Scar Management.  Updates/Grading for next session: continue scar management, continue edema management, progress range of motion, progress to strengthening as tolerated     Onesimo Richard, OT   6/10/2024

## 2024-06-13 ENCOUNTER — CLINICAL SUPPORT (OUTPATIENT)
Dept: REHABILITATION | Facility: HOSPITAL | Age: 39
End: 2024-06-13
Payer: MEDICAID

## 2024-06-13 DIAGNOSIS — M54.50 CHRONIC BILATERAL LOW BACK PAIN WITHOUT SCIATICA: Primary | ICD-10-CM

## 2024-06-13 DIAGNOSIS — M25.60 DECREASED RANGE OF MOTION: ICD-10-CM

## 2024-06-13 DIAGNOSIS — R29.898 WEAKNESS OF BOTH LOWER EXTREMITIES: ICD-10-CM

## 2024-06-13 DIAGNOSIS — M54.6 CHRONIC BILATERAL THORACIC BACK PAIN: ICD-10-CM

## 2024-06-13 DIAGNOSIS — G89.29 CHRONIC BILATERAL THORACIC BACK PAIN: ICD-10-CM

## 2024-06-13 DIAGNOSIS — R29.898 DECREASED GRIP STRENGTH OF LEFT HAND: ICD-10-CM

## 2024-06-13 DIAGNOSIS — L90.5 SCAR ADHERENT: Primary | ICD-10-CM

## 2024-06-13 DIAGNOSIS — Z74.09 IMPAIRED FUNCTIONAL MOBILITY, BALANCE, AND ENDURANCE: ICD-10-CM

## 2024-06-13 DIAGNOSIS — G89.29 CHRONIC BILATERAL LOW BACK PAIN WITHOUT SCIATICA: Primary | ICD-10-CM

## 2024-06-13 DIAGNOSIS — R22.32 LOCALIZED SWELLING ON LEFT HAND: ICD-10-CM

## 2024-06-13 PROCEDURE — 97530 THERAPEUTIC ACTIVITIES: CPT | Mod: PN

## 2024-06-13 PROCEDURE — 97110 THERAPEUTIC EXERCISES: CPT | Mod: PN,CQ

## 2024-06-13 PROCEDURE — 97140 MANUAL THERAPY 1/> REGIONS: CPT | Mod: PN,CQ

## 2024-06-13 NOTE — PROGRESS NOTES
"[facility]  Occupational Therapy Treatment Note     Date: 6/13/2024  Name: Radha Bowens  Clinic Number: [id number]    Therapy Diagnosis:   Encounter Diagnoses   Name Primary?    Scar adherent Yes    Localized swelling on left hand     Decreased  strength of left hand     Decreased range of motion          Physician: No ref. provider found    Physician Orders: Eval and Treat  S/p left carpal tunnel release DOS 4/8/24. Eval and treat, modalities as needed  Medical Diagnosis: Z98.890 (ICD-10-CM) - S/P carpal tunnel release  Surgical Procedure and Date: RELEASE, CARPAL TUNNEL (Left), 4/8/2024  Evaluation Date: 5/7/2024  Insurance Authorization Period Expiration: 4/23/2025   Plan of Care Certification Period: 5/7/2024 - 7/2/2024  Date of Return to MD: None noted in medical chart for referring physician at this time.    Visit # / Visits authorized: 4 / 12 (plus initial evaluation)   FOTO: 1/ 3 Initial=18 / Goal=55       Precautions:  Standard and high blood pressure (takes medication)      Time In: 10:48 am (10:30 appointment)   Time Out: 11:22 am   Total Billable Time: 34 minutes (2TA)     Patient is approximately 8 weeks post-op as of 6/10/2024.   Subjective     Patient reports: she continues to perform scar massage and wrist stretches at home.  She has purchased and is using silicone scar gel sheets.  She continues to have some discomfort at volar wrist with flexion of wrist.      She was compliant with home exercise program given last session.   Response to previous treatment: gripping has improved, her "feeling" is better; no significant soreness following last session    Functional change: no issues using with light ADLs      Pain: 4/10    Location: left wrist      Objective     Objective Measures updated at progress report unless specified.    Treatment     Radha received the treatments listed below:      therapeutic exercises to develop strength, endurance, ROM, and flexibility " for 19 minutes including:    Wrist 3 ways over wedge  2#, 2/10 reps     PHG, setting #3, 29#  25 reps      Isospheres, supinated   2 min each    Thumb radial/palmar abduction, yellow RB  20 reps each    Finger abduction/adduction, yellow RB   20 reps    Theraputty, yellow     -molding  2 min   -log rolls  2 trials    -lateral/tripod pinches  1trial each         manual therapy techniques: Myofacial release, Manual Lymphatic Drainage, Soft tissue Mobilization, and scar massage were applied to the: L hand/wrist/forearm for 15 minutes, including:  - use of hand techniques, cupping and IASTM tools to increase blood flow/circulation, improve soft tissue pliability and decrease pain.         Supervised modalities after being cleared for contradictions:   Paraffin w/ MHP to left hand/wrist for 8 min, pre-tx to decrease pain & increase tissue extensibility     Patient Education and Home Exercises     Education provided:   -recommend silicone scar gel strips to decrease hypertrophic scarring at surgical incision   -issued size C tubigrip for left hand/wrist to decrease edema and pain; patient educated on precautions to watch for and verbalized understanding   -Issued/reviewed initial HEP   -issued/reviewed scar massage techniques and edema control techniques; handouts issued    -role of OT, goals for OT, scheduling/cancellations, insurance limitations with patient.  - Progress towards goals     Written Home Exercises Provided: Patient instructed to cont prior HEP.  Exercises were reviewed and Radha was able to demonstrate them prior to the end of the session.  Radha demonstrated good  understanding of the home exercise program provided. See electronic medical record under Patient Instructions for exercises provided during therapy sessions.       Assessment   Radha Bowens is a 38 y.o. female referred to outpatient occupational therapy and presents with a medical diagnosis of Z98.890 (ICD-10-CM) - S/P carpal tunnel  release.  Scar tissue at volar wrist is remains moderately dense with moderate adhesions beneath, but is improving slowly.  Swelling at healed incision is minimal to none.  Muscle tightness of volar/dorsal forearms is improved and minimal.  Patient has mild difficulty with pinching exercises today reporting pain in volar wrist.  She tolerated all other exercises well without pain or difficulty.  Weakness and decreased endurance of hand is noted with therapeutic exercises.      Radha is progressing well towards her goals and there are no updates to goals at this time. Pt prognosis is Good.     Patient will continue to benefit from skilled outpatient occupational therapy to address the deficits listed in the problem list on initial evaluation provide patient/family education and to maximize patient's level of independence in the home and community environment.     Patient's spiritual, cultural and educational needs considered and patient agreeable to plan of care and goals.    Anticipated barriers to occupational therapy: therapy attendance, compliance with HEP    Goals:  Short Term Goals: (in 4 weeks)  1) Patient will be independent with HEP in 1-2 visits -Met 6/120/2024  2) Decrease pain in left hand to no more than 2-3/10 at worst in ADL/IADL's -Progressing   3) Increase total AROM in left wrist by 20 degrees for improved functioning in ADL/IADL's  -Progressing   4) Assess  strength for increased functional use in ADL/IADL's  -Progressing   5) Decrease edema in left wrist by 5 cm  -Progressing       Long Term Goals: (in 8 weeks)   1) Decrease pain in left wrist to no more than 1-2/10 at worst in ADL/IADL's  2) Increase AROM of left wrist to WFL for increased functioning in ADL/IADL's  3) Increase  strength by 20% of original measures for improved functioning in ADL/IADL's  4) Increased functioning in ADL/IADL's, as evidenced by a FOTO functional measure score of no less than 55  5) Decrease edema in left  wrist to trace or none     Plan   Plan of Care Certification: 5/7/2024 to 7/2/2024.      Outpatient Occupational Therapy 2 times weekly for 8 weeks to include the following interventions: Paraffin, Fluidotherapy, Manual therapy/joint mobilizations, Modalities for pain management, US 3 mhz, Therapeutic exercises/activities., Strengthening, Edema Control, and Scar Management.  Updates/Grading for next session: continue scar management, continue edema management, progress range of motion, progress to strengthening as tolerated     Onesimo Richard OT   6/13/2024

## 2024-06-13 NOTE — PROGRESS NOTES
"[facility]   Physical Therapy Treatment Note      Name: Radha Bowens  Clinic Number: [id number]    Therapy Diagnosis:   Encounter Diagnoses   Name Primary?    Chronic bilateral low back pain without sciatica Yes    Impaired functional mobility, balance, and endurance     Chronic bilateral thoracic back pain     Weakness of both lower extremities      Physician: Joey Hampton MD    Visit Date: 6/13/2024    Physician Orders: PT Eval and Treat   Medical Diagnosis from Referral:               M54.59 (ICD-10-CM) - Other low back pain  Evaluation Date: 5/22/2024  Authorization Period Expiration: 12/31/2024  Plan of Care Expiration: 8/22/2024  Visit # / Visits authorized: 2/ 18 (+eval)     PN DUE: 6/22/2024     Time In: 1:35 PM  Time Out: 2:20 PM  Total Appointment Time (timed & untimed codes): 45 minutes     Precautions: Standard    PTA Visit #: 2/5       Subjective     Patient reports: no new complaints.  She was compliant with home exercise program.  Response to previous treatment: first follow up  Functional change:     Pain:  Current 10/10, worst 10/10, best 10/10   Location: bilateral back pain  Description: Aching and Shooting  Aggravating Factors: all daily activities   Easing Factors: "Go outside and sit by the pool and lay back"    Objective      Objective Measures updated at progress report unless specified.     Treatment     Radha received the treatments listed below:      therapeutic exercises to develop strength, endurance, ROM, flexibility, posture, and core stabilization for 30 minutes including:  Stationary bike, lvl5, 7'  Gastroc stretch on slant board, lvl3, 3x1'  Heel raises, 3x15  Seated Lumbar flexion stretch, 3x10  DKTC w/ SB, 3x10  F4 LTR, 1x15  Repeated sit to stands w/ yellow weighted ball, 3x10  Hip 3-way on foam, 1x10  Isometric abdominals w/ SB, 1x10  Clams, 3x10     manual therapy techniques: Joint mobilizations, Manual traction, Myofacial release, Soft " tissue Mobilization, and Friction Massage were applied for 15 minutes, including:  Sciatic nerve glides and stretch    neuromuscular re-education activities to improve: Balance, Coordination, Kinesthetic, Sense, Proprioception, and Posture for 0 minutes. The following activities were included:      therapeutic activities to improve functional performance for 0  minutes, including:          Patient Education and Home Exercises       Education provided:   - rationale for treatment    Written Home Exercises Provided: Patient instructed to cont prior HEP. Exercises were reviewed and Radha was able to demonstrate them prior to the end of the session.  Radha demonstrated good  understanding of the education provided. See Electronic Medical Record under Patient Instructions for exercises provided during therapy sessions    Assessment     Patient reports to therapy without any new complaints. Continued POC with interventions targeting strength and ROM for core, hips, and BLE. She was able to perform all interventions without any complaints of increased pain. Encouraged continued participation in HEP.  Will inquire response to treatment next visit.     Radha Is progressing well towards her goals.   Patient prognosis is Good.     Patient will continue to benefit from skilled outpatient physical therapy to address the deficits listed in the problem list box on initial evaluation, provide pt/family education and to maximize pt's level of independence in the home and community environment.     Patient's spiritual, cultural and educational needs considered and pt agreeable to plan of care and goals.     Anticipated barriers to physical therapy: pain, co-morbidities    Goals: Short Term Goals: 4 weeks   Patient will demonstrate independence with HEP in order to progress toward functional independence  Pt will demonstrate improved pain by reports of less than or equal to 7/10 worst pain on the verbal rating scale in order to  progress toward maximal functional ability and improve QOL.  Patient will improve right lower extremity strength by 1/2 MMT for improved strength needed to perform daily activities.   Patient will perform 5 time sit to  less than 25 seconds for improved strength and decreased fall risk.   Patient will improve right single leg stance to greater than or equal to 20 seconds for improved balance.      Long Term Goals: 8-10 weeks   Patient will demonstrate improved function as indicated by a functional score of 31 on the FOTO.  Pt will demonstrate improved pain by reports of less than or equal to 5/10 worst pain on the verbal rating scale in order to progress toward maximal functional ability and improve QOL.  Patient will improve bilateral lower extremity strength to greater than or equal to 4+/5 MMT for improved strength needed to return to prior level of function.   Patient will perform 5 time sit to  less than 15 seconds for improved strength and decreased fall risk.   Patient will improve right single leg stance to greater than or equal to 25 seconds for improved balance.   Patient will report that she has started to work out at home or at the gym for return to prior level of function.     Plan     Plan of care Certification: 5/22/2024 to 8/22/2024.     Outpatient Physical Therapy 2 times weekly for 10 weeks to include the following interventions: Cervical/Lumbar Traction, Electrical Stimulation  , Manual Therapy, Moist Heat/ Ice, Neuromuscular Re-ed, Orthotic Management and Training, Patient Education, Self Care, Therapeutic Activities, and Therapeutic Exercise. And any other therapies and modalities as clinically indicated and appropriate, including but not limited to FDN and telehealth. Pt may be seen by PTA as a part of pt's care team.     Rafael Taylor PTA

## 2024-08-27 ENCOUNTER — DOCUMENTATION ONLY (OUTPATIENT)
Dept: REHABILITATION | Facility: HOSPITAL | Age: 39
End: 2024-08-27
Payer: MEDICAID

## 2024-08-27 NOTE — PROGRESS NOTES
[facility]   Discharge Note    Name: Radha Bowens  Clinic Number: [id number]    Therapy Diagnosis:        Encounter Diagnoses   Name Primary?    Scar adherent Yes    Localized swelling on left hand      Decreased  strength of left hand      Decreased range of motion      Physician: Lee Ann Woods PA-C    Physician Orders: Eval and Treat  S/p left carpal tunnel release DOS 4/8/24. Eval and treat, modalities as needed  Medical Diagnosis: Z98.890 (ICD-10-CM) - S/P carpal tunnel release  Evaluation Date: 5/7/2024      Date of Last visit: 6/13/2024  Total Visits Received: 4    ASSESSMENT    Radha Bowens is a 38 y.o. female referred to outpatient occupational therapy and presents with a medical diagnosis of Z98.890 (ICD-10-CM) - S/P carpal tunnel release.  Scar tissue at volar wrist is remains moderately dense with moderate adhesions beneath, but is improving slowly.  Swelling at healed incision is minimal to none.  Muscle tightness of volar/dorsal forearms is improved and minimal.  Patient has mild difficulty with pinching exercises today reporting pain in volar wrist.  She tolerated all other exercises well without pain or difficulty.  Weakness and decreased endurance of hand is noted with therapeutic exercises.        Discharge reason: Patient has not attended therapy since 6/13/2024    Discharge FOTO Score: none taken due to patient not returning to therapy     Goals:   Short Term Goals: (in 4 weeks)  1) Patient will be independent with HEP in 1-2 visits -Met 6/120/2024  2) Decrease pain in left hand to no more than 2-3/10 at worst in ADL/IADL's -Progressing   3) Increase total AROM in left wrist by 20 degrees for improved functioning in ADL/IADL's  -Progressing   4) Assess  strength for increased functional use in ADL/IADL's  -Progressing   5) Decrease edema in left wrist by 5 cm  -Progressing       Long Term Goals: (in 8 weeks)   1) Decrease pain in left wrist to no more  than 1-2/10 at worst in ADL/IADL's  2) Increase AROM of left wrist to WFL for increased functioning in ADL/IADL's  3) Increase  strength by 20% of original measures for improved functioning in ADL/IADL's  4) Increased functioning in ADL/IADL's, as evidenced by a FOTO functional measure score of no less than 55  5) Decrease edema in left wrist to trace or none     PLAN   This patient is discharged from Occupational Therapy      Onesimo Richard, OT

## (undated) DEVICE — UNDERGLOVES BIOGEL PI SZ 7 LF

## (undated) DEVICE — APPLICATOR CHLORAPREP ORN 26ML

## (undated) DEVICE — GLOVE SURGICAL LATEX SZ 7

## (undated) DEVICE — DRAPE THREE-QTR REINF 53X77IN

## (undated) DEVICE — UNDERGLOVES BIOGEL PI SIZE 7.5

## (undated) DEVICE — BANDAGE ESMARK ELASTIC ST 4X9

## (undated) DEVICE — GOWN POLY REINF BRTH SLV XL

## (undated) DEVICE — SUT 4-0 ETHILON 18 PS-2

## (undated) DEVICE — DRAPE HAND STERILE

## (undated) DEVICE — DRESSING XEROFORM NONADH 1X8IN

## (undated) DEVICE — DRESSING XEROFORM FOIL PK 1X8

## (undated) DEVICE — SCRUB HIBICLENS 4% CHG 4OZ

## (undated) DEVICE — SUPPORT ULNA NERVE PROTECTOR

## (undated) DEVICE — PACK BASIC SETUP SC BR

## (undated) DEVICE — GOWN NONREINF SET-IN SLV 2XL

## (undated) DEVICE — SPONGE COTTON TRAY 4X4IN

## (undated) DEVICE — DRAPE U SPLIT SHEET 54X76IN

## (undated) DEVICE — GLOVE BIOGEL SZ 8 1/2

## (undated) DEVICE — UNDERGLOVES BIOGEL PI SIZE 8.5

## (undated) DEVICE — POSITIONER HEAD DONUT 9IN FOAM

## (undated) DEVICE — SOL 9P NACL IRR PIC IL

## (undated) DEVICE — TOWEL OR DISP STRL BLUE 4/PK

## (undated) DEVICE — COVER CAMERA OPERATING ROOM

## (undated) DEVICE — KIT TURNOVER

## (undated) DEVICE — NDL SAFETY 25G X 1.5 ECLIPSE

## (undated) DEVICE — PAD CAST SPECIALIST STRL 3

## (undated) DEVICE — PAD ABDOMINAL STERILE 8X10IN

## (undated) DEVICE — UNDERGLOVE BIOGEL PI SZ 6.5 LF

## (undated) DEVICE — TOURNIQUET SB QC SP 24X4IN

## (undated) DEVICE — ALCOHOL 70% ISOP RUBBING 4OZ

## (undated) DEVICE — SYR 10CC LUER LOCK

## (undated) DEVICE — PAD ABD 8X10 STERILE

## (undated) DEVICE — COVER LIGHT HANDLE 80/CA

## (undated) DEVICE — BANDAGE ELASTIC 3X5 VELCRO ST

## (undated) DEVICE — GLOVE SURGICAL LATEX SZ 6.5